# Patient Record
Sex: FEMALE | Race: WHITE | NOT HISPANIC OR LATINO | URBAN - METROPOLITAN AREA
[De-identification: names, ages, dates, MRNs, and addresses within clinical notes are randomized per-mention and may not be internally consistent; named-entity substitution may affect disease eponyms.]

---

## 2017-03-29 ENCOUNTER — OUTPATIENT (OUTPATIENT)
Dept: OUTPATIENT SERVICES | Facility: HOSPITAL | Age: 82
LOS: 1 days | End: 2017-03-29
Payer: MEDICARE

## 2017-03-29 VITALS
WEIGHT: 104.94 LBS | DIASTOLIC BLOOD PRESSURE: 78 MMHG | SYSTOLIC BLOOD PRESSURE: 104 MMHG | HEIGHT: 57.5 IN | OXYGEN SATURATION: 97 % | TEMPERATURE: 98 F | RESPIRATION RATE: 20 BRPM | HEART RATE: 72 BPM

## 2017-03-29 DIAGNOSIS — H26.9 UNSPECIFIED CATARACT: Chronic | ICD-10-CM

## 2017-03-29 DIAGNOSIS — S83.242D OTHER TEAR OF MEDIAL MENISCUS, CURRENT INJURY, LEFT KNEE, SUBSEQUENT ENCOUNTER: ICD-10-CM

## 2017-03-29 DIAGNOSIS — Z41.1 ENCOUNTER FOR COSMETIC SURGERY: Chronic | ICD-10-CM

## 2017-03-29 DIAGNOSIS — H91.90 UNSPECIFIED HEARING LOSS, UNSPECIFIED EAR: ICD-10-CM

## 2017-03-29 DIAGNOSIS — S83.209A UNSPECIFIED TEAR OF UNSPECIFIED MENISCUS, CURRENT INJURY, UNSPECIFIED KNEE, INITIAL ENCOUNTER: ICD-10-CM

## 2017-03-29 DIAGNOSIS — H54.8 LEGAL BLINDNESS, AS DEFINED IN USA: ICD-10-CM

## 2017-03-29 LAB
BUN SERPL-MCNC: 23 MG/DL — SIGNIFICANT CHANGE UP (ref 7–23)
CALCIUM SERPL-MCNC: 9.2 MG/DL — SIGNIFICANT CHANGE UP (ref 8.4–10.5)
CHLORIDE SERPL-SCNC: 102 MMOL/L — SIGNIFICANT CHANGE UP (ref 98–107)
CO2 SERPL-SCNC: 26 MMOL/L — SIGNIFICANT CHANGE UP (ref 22–31)
CREAT SERPL-MCNC: 0.87 MG/DL — SIGNIFICANT CHANGE UP (ref 0.5–1.3)
GLUCOSE SERPL-MCNC: 56 MG/DL — LOW (ref 70–99)
HCT VFR BLD CALC: 39.1 % — SIGNIFICANT CHANGE UP (ref 34.5–45)
HGB BLD-MCNC: 12.4 G/DL — SIGNIFICANT CHANGE UP (ref 11.5–15.5)
MCHC RBC-ENTMCNC: 29.3 PG — SIGNIFICANT CHANGE UP (ref 27–34)
MCHC RBC-ENTMCNC: 31.7 % — LOW (ref 32–36)
MCV RBC AUTO: 92.4 FL — SIGNIFICANT CHANGE UP (ref 80–100)
PLATELET # BLD AUTO: 267 K/UL — SIGNIFICANT CHANGE UP (ref 150–400)
PMV BLD: 11.8 FL — SIGNIFICANT CHANGE UP (ref 7–13)
POTASSIUM SERPL-MCNC: 4.4 MMOL/L — SIGNIFICANT CHANGE UP (ref 3.5–5.3)
POTASSIUM SERPL-SCNC: 4.4 MMOL/L — SIGNIFICANT CHANGE UP (ref 3.5–5.3)
RBC # BLD: 4.23 M/UL — SIGNIFICANT CHANGE UP (ref 3.8–5.2)
RBC # FLD: 14.1 % — SIGNIFICANT CHANGE UP (ref 10.3–14.5)
SODIUM SERPL-SCNC: 140 MMOL/L — SIGNIFICANT CHANGE UP (ref 135–145)
WBC # BLD: 6.34 K/UL — SIGNIFICANT CHANGE UP (ref 3.8–10.5)
WBC # FLD AUTO: 6.34 K/UL — SIGNIFICANT CHANGE UP (ref 3.8–10.5)

## 2017-03-29 PROCEDURE — 93010 ELECTROCARDIOGRAM REPORT: CPT

## 2017-03-29 NOTE — H&P PST ADULT - PROBLEM SELECTOR PLAN 1
Left Knee Arthroscopy partial Medial and lateral Menisectomy    Pre op instructions including pepcid given to pt     Pt to Dr manzanares for pre op m/c

## 2017-03-29 NOTE — H&P PST ADULT - NEGATIVE NEUROLOGICAL SYMPTOMS
no generalized seizures/no weakness/no syncope/no focal seizures/no transient paralysis/no paresthesias

## 2017-03-29 NOTE — H&P PST ADULT - RS GEN PE MLT RESP DETAILS PC
respirations non-labored/airway patent/good air movement/breath sounds equal/clear to auscultation bilaterally

## 2017-03-29 NOTE — H&P PST ADULT - ABILITY TO HEAR (WITH HEARING AID OR HEARING APPLIANCE IF NORMALLY USED):
Mildly to Moderately Impaired: difficulty hearing in some environments or speaker may need to increase volume or speak distinctly/bilateral hearing aides

## 2017-03-29 NOTE — H&P PST ADULT - NSANTHOSAYNRD_GEN_A_CORE
No. SONI screening performed.  STOP BANG Legend: 0-2 = LOW Risk; 3-4 = INTERMEDIATE Risk; 5-8 = HIGH Risk

## 2017-03-29 NOTE — H&P PST ADULT - PSH
Bilateral cataracts  s/p Right  cataract excision/ s/p Left Cataract Excision  S/P rhinoplasty  @ 16 y.o.

## 2017-03-29 NOTE — H&P PST ADULT - LYMPHATIC
anterior cervical L/anterior cervical R/posterior cervical R/posterior cervical L/supraclavicular R/supraclavicular L

## 2017-03-29 NOTE — H&P PST ADULT - HISTORY OF PRESENT ILLNESS
Pt iooprtss an 85 y.o. female ;   Pt reports " stumbling ' and injuring left knee . Pt to surgeon ; an mri was done ; pt states xrays were also done ; pt now presents for Left Knee Arthroscopy l Partial Medial and Lateral Menisectomy Pt i an 85 y.o. female ;   Pt reports " stumbling ' and injuring left knee . Pt to surgeon ; an mri was done ; pt states xrays were also done ; pt now presents for Left Knee Arthroscopy l Partial Medial and Lateral Menisectomy

## 2017-04-05 ENCOUNTER — OUTPATIENT (OUTPATIENT)
Dept: OUTPATIENT SERVICES | Facility: HOSPITAL | Age: 82
LOS: 1 days | Discharge: HOME | End: 2017-04-05

## 2017-04-05 ENCOUNTER — TRANSCRIPTION ENCOUNTER (OUTPATIENT)
Age: 82
End: 2017-04-05

## 2017-04-05 VITALS
RESPIRATION RATE: 14 BRPM | SYSTOLIC BLOOD PRESSURE: 130 MMHG | HEIGHT: 57.5 IN | WEIGHT: 104.94 LBS | OXYGEN SATURATION: 97 % | DIASTOLIC BLOOD PRESSURE: 61 MMHG | HEART RATE: 73 BPM | TEMPERATURE: 98 F

## 2017-04-05 VITALS
HEART RATE: 97 BPM | OXYGEN SATURATION: 99 % | SYSTOLIC BLOOD PRESSURE: 124 MMHG | TEMPERATURE: 99 F | DIASTOLIC BLOOD PRESSURE: 56 MMHG | RESPIRATION RATE: 22 BRPM

## 2017-04-05 DIAGNOSIS — Z41.1 ENCOUNTER FOR COSMETIC SURGERY: Chronic | ICD-10-CM

## 2017-04-05 DIAGNOSIS — S83.242D OTHER TEAR OF MEDIAL MENISCUS, CURRENT INJURY, LEFT KNEE, SUBSEQUENT ENCOUNTER: ICD-10-CM

## 2017-04-05 DIAGNOSIS — H26.9 UNSPECIFIED CATARACT: Chronic | ICD-10-CM

## 2017-04-05 RX ORDER — OXYCODONE AND ACETAMINOPHEN 5; 325 MG/1; MG/1
2 TABLET ORAL
Qty: 30 | Refills: 0
Start: 2017-04-05 | End: 2017-04-09

## 2017-04-05 NOTE — ASU DISCHARGE PLAN (ADULT/PEDIATRIC). - SPECIAL INSTRUCTIONS
Please refer to MD pre-printed instruction sheet.    IF any written instructions differ from what your surgeon spoke with you about, please follow what your surgeon instructed over the pre-printed instructions.  Please call your surgeon's office if you have any questions.

## 2017-04-05 NOTE — ASU DISCHARGE PLAN (ADULT/PEDIATRIC). - NOTIFY
Fever greater than 101/Persistent Nausea and Vomiting/Swelling that continues/Bleeding that does not stop/Pain not relieved by Medications/Unable to Urinate

## 2017-04-05 NOTE — ASU DISCHARGE PLAN (ADULT/PEDIATRIC). - NURSING INSTRUCTIONS
DO NOT take any Tylenol (Acetaminophen) or narcotics containing Tylenol until after  __6:30PM____ . You received Tylenol during your operation and it can cause damage to your liver if too much is taken within a 24 hour time period.

## 2017-11-20 ENCOUNTER — APPOINTMENT (OUTPATIENT)
Dept: OPHTHALMOLOGY | Facility: CLINIC | Age: 82
End: 2017-11-20
Payer: MEDICARE

## 2017-11-20 DIAGNOSIS — H53.009 UNSPECIFIED AMBLYOPIA, UNSPECIFIED EYE: ICD-10-CM

## 2017-11-20 DIAGNOSIS — H55.09 OTHER FORMS OF NYSTAGMUS: ICD-10-CM

## 2017-11-20 PROCEDURE — 92012 INTRM OPH EXAM EST PATIENT: CPT

## 2018-04-11 ENCOUNTER — APPOINTMENT (OUTPATIENT)
Dept: OPHTHALMOLOGY | Facility: CLINIC | Age: 83
End: 2018-04-11
Payer: MEDICARE

## 2018-04-11 DIAGNOSIS — Z96.1 PRESENCE OF INTRAOCULAR LENS: ICD-10-CM

## 2018-04-11 PROCEDURE — 92014 COMPRE OPH EXAM EST PT 1/>: CPT

## 2018-06-20 ENCOUNTER — APPOINTMENT (OUTPATIENT)
Dept: OPHTHALMOLOGY | Facility: CLINIC | Age: 83
End: 2018-06-20
Payer: MEDICARE

## 2018-06-20 PROCEDURE — 92012 INTRM OPH EXAM EST PATIENT: CPT

## 2018-06-20 RX ORDER — LOTEPREDNOL ETABONATE 5 MG/G
0.5 OINTMENT OPHTHALMIC
Qty: 1 | Refills: 0 | Status: ACTIVE | COMMUNITY
Start: 2018-06-20 | End: 1900-01-01

## 2018-08-10 PROBLEM — E78.00 PURE HYPERCHOLESTEROLEMIA, UNSPECIFIED: Chronic | Status: ACTIVE | Noted: 2017-03-29

## 2018-08-10 PROBLEM — H55.00 UNSPECIFIED NYSTAGMUS: Chronic | Status: ACTIVE | Noted: 2017-03-29

## 2018-08-10 PROBLEM — H54.8 LEGAL BLINDNESS, AS DEFINED IN USA: Chronic | Status: ACTIVE | Noted: 2017-03-29

## 2018-08-10 PROBLEM — H91.90 UNSPECIFIED HEARING LOSS, UNSPECIFIED EAR: Chronic | Status: ACTIVE | Noted: 2017-03-29

## 2018-08-20 ENCOUNTER — APPOINTMENT (OUTPATIENT)
Dept: OPHTHALMOLOGY | Facility: CLINIC | Age: 83
End: 2018-08-20
Payer: MEDICARE

## 2018-08-20 DIAGNOSIS — H04.123 DRY EYE SYNDROME OF BILATERAL LACRIMAL GLANDS: ICD-10-CM

## 2018-08-20 DIAGNOSIS — H18.51 ENDOTHELIAL CORNEAL DYSTROPHY: ICD-10-CM

## 2018-08-20 PROCEDURE — 92012 INTRM OPH EXAM EST PATIENT: CPT

## 2018-09-21 ENCOUNTER — APPOINTMENT (OUTPATIENT)
Dept: OPHTHALMOLOGY | Facility: CLINIC | Age: 83
End: 2018-09-21

## 2018-10-16 ENCOUNTER — APPOINTMENT (OUTPATIENT)
Dept: OPHTHALMOLOGY | Facility: CLINIC | Age: 83
End: 2018-10-16

## 2019-02-06 ENCOUNTER — APPOINTMENT (OUTPATIENT)
Dept: OTOLARYNGOLOGY | Facility: CLINIC | Age: 84
End: 2019-02-06
Payer: MEDICARE

## 2019-02-06 VITALS
SYSTOLIC BLOOD PRESSURE: 125 MMHG | DIASTOLIC BLOOD PRESSURE: 77 MMHG | WEIGHT: 100 LBS | BODY MASS INDEX: 20.16 KG/M2 | HEART RATE: 94 BPM | HEIGHT: 59 IN

## 2019-02-06 DIAGNOSIS — H61.23 IMPACTED CERUMEN, BILATERAL: ICD-10-CM

## 2019-02-06 DIAGNOSIS — H90.3 SENSORINEURAL HEARING LOSS, BILATERAL: ICD-10-CM

## 2019-02-06 DIAGNOSIS — Z80.9 FAMILY HISTORY OF MALIGNANT NEOPLASM, UNSPECIFIED: ICD-10-CM

## 2019-02-06 PROCEDURE — 69210 REMOVE IMPACTED EAR WAX UNI: CPT

## 2019-02-06 PROCEDURE — 92557 COMPREHENSIVE HEARING TEST: CPT

## 2019-02-06 PROCEDURE — 92567 TYMPANOMETRY: CPT

## 2019-02-06 PROCEDURE — 99204 OFFICE O/P NEW MOD 45 MIN: CPT | Mod: 25

## 2019-02-07 NOTE — ASSESSMENT
[FreeTextEntry1] : After informed verbal consent is obtained, cerumen is removed from the right and left  ear canal with a curette and suction.\par Rec: \par Debrox was prescribed and  is to be placed in both ears on a routine basis to keep them free of wax.\par Routine debridement suggested to keep the ears free of wax.\par \par bilateral sensorineural hearing loss-cleared for hearing aids\par \par tinnitus-onbserve\par

## 2019-02-07 NOTE — PHYSICAL EXAM
[Midline] : trachea located in midline position [Normal] : no rashes [de-identified] : b/l wax [] : septum deviated bilaterally

## 2019-02-07 NOTE — REASON FOR VISIT
[Initial Evaluation] : an initial evaluation for [Tinnitus] : tinnitus [FreeTextEntry2] : L ear tinnitus

## 2019-02-07 NOTE — HISTORY OF PRESENT ILLNESS
[No] : patient does not have a  history of radiation therapy [Tinnitus] : tinnitus [Hearing Loss] : hearing loss [None] : No risk factors have been identified. [de-identified] : 88 y/o w/ hx of minor hearing loss as a child who came in complaining of hearing noises in her L ear that’ has been going on for 3 weeks. She states this started after listening to her audio books. She described the noises as a crackling sound that progressively gets worse. She states when the noise becomes louder it sounds like a train. Laying down makes the tinnitus worse compared to when she is sitting. She is currently wearing hearing aides. No vertigo, ear pain, nasal congestion, dysphagia, fevers, sinus infection.  [Vertigo] : no vertigo [Anxiety] : no anxiety [Dizziness] : no dizziness [Headache] : no headache [Recurrent Otitis Media] : no recurrent otitis media [Otitis Media with Effusion] : no otitis media with effusion [Congenital Ear Malformation] : no congenital ear malformation [Perilymphatic Fistula] : no perilymphatic fistula [Hypertension] : no hypertension [Loud Noise Exposure] : no history of loud noise exposure [Smoking] : no smoking [Early Onset Hearing Loss] : no early onset hearing loss [Stroke] : no stroke [Facial Pain] : no facial pain [Facial Pressure] : no facial pressure [Nasal Congestion] : no nasal congestion [Ear Pain] : no ear pain [Ear Pressure] : no ear pressure [Fever] : no fever [Diplopia] : no diplopia [Ear Fullness] : no ear fullness [Allergic Rhinitis] : no allergic rhinitis [Environmental Allergies] : no environmental allergies [Seasonal Allergies] : no seasonal allergies [Environmental Allergens] : no environmental allergens [Adenoidectomy] : no adenoidectomy [Nasal FB Removal] : no nasal foreign body removal [Allergies] : no allergies [Asthma] : no asthma [Neck Mass] : no neck mass [Neck Pain] : no neck pain [Chills] : no chills [Cold Intolerance] : no cold intolerance [Cough] : no cough [Fatigue] : no fatigue [Heat Intolerance] : no heat intolerance [Hyperthyroidism] : no hyperthyroidism [Sialadenitis] : no sialadenitis [Hodgkin Disease] : no hodgkin disease [Non-Hodgkin Lymphoma] : no non-hodgkin lymphoma [Tobacco Use] : no tobacco use [Alcohol Use] : no alcohol use [Graves Disease] : no graves disease [Thyroid Cancer] : no thyroid cancer

## 2019-02-08 ENCOUNTER — APPOINTMENT (OUTPATIENT)
Dept: OTOLARYNGOLOGY | Facility: CLINIC | Age: 84
End: 2019-02-08
Payer: MEDICARE

## 2019-02-08 VITALS
WEIGHT: 100 LBS | HEIGHT: 59 IN | SYSTOLIC BLOOD PRESSURE: 147 MMHG | HEART RATE: 84 BPM | DIASTOLIC BLOOD PRESSURE: 76 MMHG | BODY MASS INDEX: 20.16 KG/M2

## 2019-02-08 DIAGNOSIS — H93.13 TINNITUS, BILATERAL: ICD-10-CM

## 2019-02-08 PROCEDURE — 99214 OFFICE O/P EST MOD 30 MIN: CPT

## 2019-02-08 NOTE — REASON FOR VISIT
[Initial Consultation] : an initial consultation for [Hearing Loss] : hearing loss [Tinnitus] : tinnitus [Vertigo] : vertigo

## 2019-02-09 PROBLEM — H93.13 TINNITUS, BILATERAL: Status: ACTIVE | Noted: 2019-02-06

## 2019-02-09 NOTE — CONSULT LETTER
[Dear  ___] : Dear  [unfilled], [Consult Letter:] : I had the pleasure of evaluating your patient, [unfilled]. [Please see my note below.] : Please see my note below. [Consult Closing:] : Thank you very much for allowing me to participate in the care of this patient.  If you have any questions, please do not hesitate to contact me. [Sincerely,] : Sincerely, [FreeTextEntry3] : Everrado Wade MD, FACS \par  of Otolaryngology  \par [facility] \par [address] \[place] [address] \par Phone: [phone] \par Fax: [phone] \par \par

## 2019-02-09 NOTE — REVIEW OF SYSTEMS
[Negative] : Heme/Lymph [Ear Pain] : ear pain [Hearing Loss] : hearing loss [Dizziness] : dizziness [Ear Noises] : ear noises [Eye Pain] : eye pain [Eyes Itch] : itching of the eyes [FreeTextEntry3] : watery eyes

## 2019-02-09 NOTE — HISTORY OF PRESENT ILLNESS
[de-identified] : 86 yo seen 2 days ago by Dr. Santamaria for tinnitus after noise exposure. " complaining of hearing noises in her L ear that’ has been going on for 3 weeks. She states this started after listening to her audio books. She described the noises as a crackling sound that progressively gets worse. She states when the noise becomes louder it sounds like a train. Laying down makes the tinnitus worse compared to when she is sitting. She is currently wearing hearing aides. No vertigo, ear pain, nasal congestion, dysphagia, fevers, sinus infection. " Dr Santamaria removed cerumen  felt better but noise still present - came for second opinion. Cant sleep at night. Takes ASA daily.\par

## 2019-02-18 ENCOUNTER — APPOINTMENT (OUTPATIENT)
Dept: OTOLARYNGOLOGY | Facility: CLINIC | Age: 84
End: 2019-02-18

## 2019-05-13 ENCOUNTER — APPOINTMENT (OUTPATIENT)
Dept: PHARMACY | Facility: CLINIC | Age: 84
End: 2019-05-13
Payer: SELF-PAY

## 2019-05-13 PROCEDURE — V5010C: CUSTOM

## 2019-06-05 ENCOUNTER — APPOINTMENT (OUTPATIENT)
Dept: PHARMACY | Facility: CLINIC | Age: 84
End: 2019-06-05

## 2019-06-06 ENCOUNTER — OTHER (OUTPATIENT)
Age: 84
End: 2019-06-06

## 2019-08-07 ENCOUNTER — APPOINTMENT (OUTPATIENT)
Dept: OTOLARYNGOLOGY | Facility: CLINIC | Age: 84
End: 2019-08-07

## 2019-08-07 ENCOUNTER — APPOINTMENT (OUTPATIENT)
Dept: OPHTHALMOLOGY | Facility: CLINIC | Age: 84
End: 2019-08-07

## 2019-08-27 ENCOUNTER — APPOINTMENT (OUTPATIENT)
Dept: OPHTHALMOLOGY | Facility: CLINIC | Age: 84
End: 2019-08-27

## 2019-10-29 ENCOUNTER — NON-APPOINTMENT (OUTPATIENT)
Age: 84
End: 2019-10-29

## 2019-10-29 ENCOUNTER — APPOINTMENT (OUTPATIENT)
Dept: OPHTHALMOLOGY | Facility: CLINIC | Age: 84
End: 2019-10-29
Payer: MEDICARE

## 2019-10-29 PROCEDURE — 92014 COMPRE OPH EXAM EST PT 1/>: CPT

## 2020-11-02 NOTE — ASU DISCHARGE PLAN (ADULT/PEDIATRIC). - ACTIVITY LEVEL
Patient informed no sports/gym/weight bearing as tolerated/Please refer to MD pre-printed instruction sheet.

## 2023-02-22 NOTE — ASU PREOPERATIVE ASSESSMENT, ADULT ([facility] ONLY) - TEACHING/LEARNING FACTORS IMPACT ABILITY TO LEARN
KNEE VISIT      HISTORY OF PRESENT ILLNESS    Emily Flaherty is a 61 y.o. female who presents for evaluation of bilateral knee pain. I saw her several years ago for her right knee and now she has pain in both knees this been going on for the last few years. She has had intermittently but over the last 3 months is escalated and become much worse to where she grades her pain 10/10 frequently. She is not able to sleep and knows that she has swelling and points to the inner aspect of both knees where her pain is. She cannot recall any specific history of trauma but has difficulty with steps twisting etc.  She has pain posteriorly in the knees which is tender. ROS    We will document in the patient history form dated 2/22/2023  All other ROS negative except for above.     Past Surgical history    Past Surgical History:   Procedure Laterality Date    BACK SURGERY  2012    lumbar     CHOLECYSTECTOMY  2013    COLONOSCOPY N/A 7/28/2020    COLON performed by Gretel Walden MD at [address] N/A 7/28/2020    EGD (10:30) performed by Gretel Walden MD at [address] [facility]  2009    carpal tunnel release    HYSTERECTOMY (CERVIX STATUS UNKNOWN)      HYSTERECTOMY, VAGINAL  2000    uterus removal only    NECK SURGERY  2013    cervical    SHOULDER SURGERY  June 2014    repair of rotator cuff       PAST MEDICAL    Past Medical History:   Diagnosis Date    Asthma     COPD (chronic obstructive pulmonary disease) ([address]     Diabetes mellitus ([address]     Gastroesophageal reflux disease without esophagitis 1/6/2021    Hypertension        Allergies    Allergies   Allergen Reactions    Dicyclomine     Lactose     Linzess [Linaclotide] Other (See Comments)     Bloating, unable to have BM    Lisinopril     Mobic [Meloxicam] Other (See Comments)     abcominal cramping    Nexium [Esomeprazole]        Meds    Current Outpatient Medications   Medication Sig Dispense Refill    methylPREDNISolone (MEDROL, JUAN ALBERTO,) 4 MG tablet Take by mouth. 1 kit 0    varenicline (CHANTIX) 1 MG tablet Take 1 tablet by mouth 2 times daily 180 tablet 2    polyethylene glycol (GOODSENSE CLEARLAX) 17 GM/SCOOP powder MIX 17 GRAMS OF POWDER (SEE INSIDE OF CAP) IN 8 OUNCES OF LIQUID.  STIR UNTIL DISSOLVED THEN DRINK SOLUTION ONCE DAILY AS NEEDED FOR CONSTIPATION 510 g 5    tiZANidine (ZANAFLEX) 4 MG tablet TAKE 1 TABLET 3 TIMES A DAYAS NEEDED FOR MUSCLE SPASMS 270 tablet 1    gabapentin (NEURONTIN) 600 MG tablet TAKE 1 TABLET 3 TIMES A  tablet 0    albuterol sulfate HFA (PROVENTIL;VENTOLIN;PROAIR) 108 (90 Base) MCG/ACT inhaler USE 2 INHALATIONS ORALLY 4 TIMES DAILY AS NEEDED FOR  WHEEZING 54 g 1    omeprazole (PRILOSEC) 40 MG delayed release capsule TAKE 1 CAPSULE TWICE DAILY BEFORE MEALS 180 capsule 1    metoclopramide (REGLAN) 10 MG tablet TAKE 1 TABLET 4 TIMES DAILY 360 tablet 1    sucralfate (CARAFATE) 1 GM tablet TAKE 1 TABLET 4 TIMES DAILY 360 tablet 1    montelukast (SINGULAIR) 10 MG tablet TAKE 1 TABLET EVERY NIGHT 90 tablet 1    metoprolol (LOPRESSOR) 100 MG tablet TAKE 1 TABLET TWICE A  tablet 1    losartan-hydroCHLOROthiazide (HYZAAR) 100-25 MG per tablet TAKE 1 TABLET DAILY 90 tablet 1    amLODIPine (NORVASC) 2.5 MG tablet TAKE 1 TABLET DAILY 90 tablet 1    topiramate (TOPAMAX) 100 MG tablet TAKE 2 TABLETS DAILY 180 tablet 1    mirtazapine (REMERON) 7.5 MG tablet TAKE 1 TABLET EVERY NIGHT 90 tablet 1    pramipexole (MIRAPEX) 0.25 MG tablet TAKE 1 TABLET DAILY 90 tablet 1    baclofen (LIORESAL) 10 MG tablet       cloNIDine (CATAPRES) 0.1 MG tablet       traMADol (ULTRAM) 50 MG tablet       atorvastatin (LIPITOR) 80 MG tablet TAKE 1 TABLET EVERY DAY 90 tablet 3    escitalopram (LEXAPRO) 20 MG tablet TAKE 1 TABLET EVERY DAY 90 tablet 1    Alcohol Swabs (B-D SINGLE USE SWABS REGULAR) PADS USE EVERY DAY AS DIRECTED 100 each 3    fluticasone-salmeterol (ADVAIR) 250-50 MCG/DOSE AEPB Inhale 1 puff into the lungs every 12 hours 3 each 3    loratadine (CLARITIN) 10 MG tablet Take 1 tablet by mouth daily 30 tablet 3    melatonin 5 MG TABS tablet Take 1 tablet by mouth nightly as needed (insomnia) 30 tablet 3    albuterol (PROVENTIL) (2.5 MG/3ML) 0.083% nebulizer solution Take 2.5 mg by nebulization every 6 hours as needed. No current facility-administered medications for this visit. Social    Social History     Socioeconomic History    Marital status: Single     Spouse name: Not on file    Number of children: Not on file    Years of education: Not on file    Highest education level: Not on file   Occupational History    Not on file   Tobacco Use    Smoking status: Former     Packs/day: 0.65     Years: 43.00     Pack years: 27.95     Types: Cigarettes     Quit date: 2022     Years since quittin.6    Smokeless tobacco: Never    Tobacco comments:     Per smoking hx audit, in  smoked 35 years, at heaviest smoked 1 ppd, average 0.65 ppd   Vaping Use    Vaping Use: Never used   Substance and Sexual Activity    Alcohol use: No     Alcohol/week: 0.0 standard drinks    Drug use: No    Sexual activity: Yes     Partners: Male   Other Topics Concern    Not on file   Social History Narrative    Not on file     Social Determinants of Health     Financial Resource Strain: Not on file   Food Insecurity: Not on file   Transportation Needs: Not on file   Physical Activity: Not on file   Stress: Not on file   Social Connections: Not on file   Intimate Partner Violence: Not on file   Housing Stability: Not on file       Family HISTORY    Family History   Problem Relation Age of Onset    Rheum Arthritis Mother     Asthma Mother     Diabetes Mother     Migraines Mother     Cancer Paternal Grandmother         unknown    Heart Failure Father         CAD    High Cholesterol Father     Hypertension Father     Stroke Father        PHYSICAL EXAM    Vital Signs: There were no vitals taken for this visit.   General Appearance:  Normal body habitus. Alert and oriented to person, place, and time. Affect:  Normal.   Gait: Antalgic. Good balance and coordination. Skin:  Intact. Sensation:  Intact. Strength:  Intact. Reflexes:  Intact. Pulses:  Intact. Knee Exam:    Effusion: 1+ bilateral    Range of Motion Right Left   Extension 0 0   Flexion 110 110     Provocative Test Right Left    Positive Negative Positive Negative   Anterior drawer [] [] [] []   Lachman [] [] [] []   Posterior drawer [] [] [] []   Varus testing [] [x] [] [x]   Valgus testing [x] [] [x] []   Joint line tenderness [x] [] [x] []     Additional Exam Comments: Her neurocirculatory lymphatic exam otherwise is normal symmetric to both lower extremities. She does have discomfort and along the medial joint line to direct palpation valgus stress and Ganesh's maneuver. She has no gross instability. 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 Conservative care options including physical therapy, NSAIDs, bracing, and activity modification were discussed. 2.  The indications for therapeutic injections were discussed. 3.  The indications for additional imaging studies were discussed. 4.  After considering the various options discussed, the patient elected to pursue a course that includes giving her a Medrol Dosepak for inflammation as opposed to an injection but would recommend MRIs of both knees to be done when it can be arranged because of the degree of severity of disability and pain that she has at this time despite her attempts to improve it over the last few years. none

## 2023-07-02 ENCOUNTER — EMERGENCY (EMERGENCY)
Facility: HOSPITAL | Age: 88
LOS: 1 days | Discharge: HOME | End: 2023-07-02
Attending: EMERGENCY MEDICINE
Payer: MEDICARE

## 2023-07-02 VITALS
HEART RATE: 92 BPM | DIASTOLIC BLOOD PRESSURE: 78 MMHG | OXYGEN SATURATION: 97 % | RESPIRATION RATE: 18 BRPM | SYSTOLIC BLOOD PRESSURE: 143 MMHG

## 2023-07-02 VITALS
DIASTOLIC BLOOD PRESSURE: 68 MMHG | TEMPERATURE: 98 F | RESPIRATION RATE: 16 BRPM | HEIGHT: 58 IN | HEART RATE: 63 BPM | OXYGEN SATURATION: 96 % | WEIGHT: 89.95 LBS | SYSTOLIC BLOOD PRESSURE: 125 MMHG

## 2023-07-02 DIAGNOSIS — Z41.1 ENCOUNTER FOR COSMETIC SURGERY: Chronic | ICD-10-CM

## 2023-07-02 DIAGNOSIS — H26.9 UNSPECIFIED CATARACT: Chronic | ICD-10-CM

## 2023-07-02 LAB
ALBUMIN SERPL ELPH-MCNC: 3.5 G/DL — SIGNIFICANT CHANGE UP (ref 3.3–5)
ALP SERPL-CCNC: 77 U/L — SIGNIFICANT CHANGE UP (ref 40–120)
ALT FLD-CCNC: 9 U/L — LOW (ref 10–45)
ANION GAP SERPL CALC-SCNC: 10 MMOL/L — SIGNIFICANT CHANGE UP (ref 5–17)
APPEARANCE UR: CLEAR — SIGNIFICANT CHANGE UP
AST SERPL-CCNC: 12 U/L — SIGNIFICANT CHANGE UP (ref 10–40)
BACTERIA # UR AUTO: ABNORMAL
BASOPHILS # BLD AUTO: 0.06 K/UL — SIGNIFICANT CHANGE UP (ref 0–0.2)
BASOPHILS NFR BLD AUTO: 0.7 % — SIGNIFICANT CHANGE UP (ref 0–2)
BILIRUB SERPL-MCNC: 0.3 MG/DL — SIGNIFICANT CHANGE UP (ref 0.2–1.2)
BILIRUB UR-MCNC: NEGATIVE — SIGNIFICANT CHANGE UP
BUN SERPL-MCNC: 29 MG/DL — HIGH (ref 7–23)
CALCIUM SERPL-MCNC: 9.1 MG/DL — SIGNIFICANT CHANGE UP (ref 8.4–10.5)
CHLORIDE SERPL-SCNC: 105 MMOL/L — SIGNIFICANT CHANGE UP (ref 96–108)
CO2 SERPL-SCNC: 26 MMOL/L — SIGNIFICANT CHANGE UP (ref 22–31)
COLOR SPEC: SIGNIFICANT CHANGE UP
CREAT SERPL-MCNC: 1.41 MG/DL — HIGH (ref 0.5–1.3)
DIFF PNL FLD: NEGATIVE — SIGNIFICANT CHANGE UP
EGFR: 35 ML/MIN/1.73M2 — LOW
EOSINOPHIL # BLD AUTO: 0.45 K/UL — SIGNIFICANT CHANGE UP (ref 0–0.5)
EOSINOPHIL NFR BLD AUTO: 5.5 % — SIGNIFICANT CHANGE UP (ref 0–6)
EPI CELLS # UR: 0 /HPF — SIGNIFICANT CHANGE UP
GLUCOSE SERPL-MCNC: 88 MG/DL — SIGNIFICANT CHANGE UP (ref 70–99)
GLUCOSE UR QL: NEGATIVE — SIGNIFICANT CHANGE UP
HCT VFR BLD CALC: 32.9 % — LOW (ref 34.5–45)
HGB BLD-MCNC: 10.1 G/DL — LOW (ref 11.5–15.5)
HYALINE CASTS # UR AUTO: 1 /LPF — SIGNIFICANT CHANGE UP (ref 0–2)
IMM GRANULOCYTES NFR BLD AUTO: 0.4 % — SIGNIFICANT CHANGE UP (ref 0–0.9)
KETONES UR-MCNC: NEGATIVE — SIGNIFICANT CHANGE UP
LEUKOCYTE ESTERASE UR-ACNC: ABNORMAL
LYMPHOCYTES # BLD AUTO: 1.68 K/UL — SIGNIFICANT CHANGE UP (ref 1–3.3)
LYMPHOCYTES # BLD AUTO: 20.6 % — SIGNIFICANT CHANGE UP (ref 13–44)
MCHC RBC-ENTMCNC: 28.8 PG — SIGNIFICANT CHANGE UP (ref 27–34)
MCHC RBC-ENTMCNC: 30.7 GM/DL — LOW (ref 32–36)
MCV RBC AUTO: 93.7 FL — SIGNIFICANT CHANGE UP (ref 80–100)
MONOCYTES # BLD AUTO: 1.02 K/UL — HIGH (ref 0–0.9)
MONOCYTES NFR BLD AUTO: 12.5 % — SIGNIFICANT CHANGE UP (ref 2–14)
NEUTROPHILS # BLD AUTO: 4.93 K/UL — SIGNIFICANT CHANGE UP (ref 1.8–7.4)
NEUTROPHILS NFR BLD AUTO: 60.3 % — SIGNIFICANT CHANGE UP (ref 43–77)
NITRITE UR-MCNC: POSITIVE
NRBC # BLD: 0 /100 WBCS — SIGNIFICANT CHANGE UP (ref 0–0)
PH UR: 7.5 — SIGNIFICANT CHANGE UP (ref 5–8)
PLATELET # BLD AUTO: 243 K/UL — SIGNIFICANT CHANGE UP (ref 150–400)
POTASSIUM SERPL-MCNC: 4.2 MMOL/L — SIGNIFICANT CHANGE UP (ref 3.5–5.3)
POTASSIUM SERPL-SCNC: 4.2 MMOL/L — SIGNIFICANT CHANGE UP (ref 3.5–5.3)
PROT SERPL-MCNC: 5.8 G/DL — LOW (ref 6–8.3)
PROT UR-MCNC: ABNORMAL
RBC # BLD: 3.51 M/UL — LOW (ref 3.8–5.2)
RBC # FLD: 14.4 % — SIGNIFICANT CHANGE UP (ref 10.3–14.5)
RBC CASTS # UR COMP ASSIST: 2 /HPF — SIGNIFICANT CHANGE UP (ref 0–4)
SODIUM SERPL-SCNC: 141 MMOL/L — SIGNIFICANT CHANGE UP (ref 135–145)
SP GR SPEC: 1.01 — SIGNIFICANT CHANGE UP (ref 1.01–1.02)
UROBILINOGEN FLD QL: NEGATIVE — SIGNIFICANT CHANGE UP
WBC # BLD: 8.17 K/UL — SIGNIFICANT CHANGE UP (ref 3.8–10.5)
WBC # FLD AUTO: 8.17 K/UL — SIGNIFICANT CHANGE UP (ref 3.8–10.5)
WBC UR QL: 34 /HPF — HIGH (ref 0–5)

## 2023-07-02 PROCEDURE — 70450 CT HEAD/BRAIN W/O DYE: CPT | Mod: MA

## 2023-07-02 PROCEDURE — 93005 ELECTROCARDIOGRAM TRACING: CPT

## 2023-07-02 PROCEDURE — 72190 X-RAY EXAM OF PELVIS: CPT | Mod: 26

## 2023-07-02 PROCEDURE — 99285 EMERGENCY DEPT VISIT HI MDM: CPT | Mod: 25

## 2023-07-02 PROCEDURE — 85025 COMPLETE CBC W/AUTO DIFF WBC: CPT

## 2023-07-02 PROCEDURE — 81001 URINALYSIS AUTO W/SCOPE: CPT

## 2023-07-02 PROCEDURE — 87086 URINE CULTURE/COLONY COUNT: CPT

## 2023-07-02 PROCEDURE — 73060 X-RAY EXAM OF HUMERUS: CPT | Mod: 26,RT

## 2023-07-02 PROCEDURE — 70450 CT HEAD/BRAIN W/O DYE: CPT | Mod: 26,MA

## 2023-07-02 PROCEDURE — 71045 X-RAY EXAM CHEST 1 VIEW: CPT

## 2023-07-02 PROCEDURE — 80053 COMPREHEN METABOLIC PANEL: CPT

## 2023-07-02 PROCEDURE — 99284 EMERGENCY DEPT VISIT MOD MDM: CPT | Mod: GC

## 2023-07-02 PROCEDURE — 73060 X-RAY EXAM OF HUMERUS: CPT

## 2023-07-02 PROCEDURE — 71045 X-RAY EXAM CHEST 1 VIEW: CPT | Mod: 26

## 2023-07-02 PROCEDURE — 96374 THER/PROPH/DIAG INJ IV PUSH: CPT

## 2023-07-02 PROCEDURE — 72190 X-RAY EXAM OF PELVIS: CPT

## 2023-07-02 RX ORDER — CEFTRIAXONE 500 MG/1
1000 INJECTION, POWDER, FOR SOLUTION INTRAMUSCULAR; INTRAVENOUS ONCE
Refills: 0 | Status: COMPLETED | OUTPATIENT
Start: 2023-07-02 | End: 2023-07-02

## 2023-07-02 RX ORDER — CEFPODOXIME PROXETIL 100 MG
1 TABLET ORAL
Qty: 14 | Refills: 0
Start: 2023-07-02 | End: 2023-07-08

## 2023-07-02 RX ADMIN — CEFTRIAXONE 100 MILLIGRAM(S): 500 INJECTION, POWDER, FOR SOLUTION INTRAMUSCULAR; INTRAVENOUS at 08:59

## 2023-07-02 NOTE — ED ADULT NURSE REASSESSMENT NOTE - NSFALLHARMRISKINTERV_ED_ALL_ED
Assistance OOB with selected safe patient handling equipment if applicable/Assistance with ambulation/Communicate risk of Fall with Harm to all staff, patient, and family/Monitor gait and stability/Monitor for mental status changes and reorient to person, place, and time, as needed/Provide visual cue: red socks, yellow wristband, yellow gown, etc/Reinforce activity limits and safety measures with patient and family/Toileting schedule using arm’s reach rule for commode and bathroom/Use of alarms - bed, stretcher, chair and/or video monitoring/Bed in lowest position, wheels locked, appropriate side rails in place/Call bell, personal items and telephone in reach/Instruct patient to call for assistance before getting out of bed/chair/stretcher/Non-slip footwear applied when patient is off stretcher/[place] to call system/Physically safe environment - no spills, clutter or unnecessary equipment/Purposeful Proactive Rounding/Room/bathroom lighting operational, light cord in reach

## 2023-07-02 NOTE — ED PROVIDER NOTE - PROGRESS NOTE DETAILS
Nivia Bell MD (PGY-1 EM): Discussed labs, XR, UA, CT with family. patient has UTI, gave abx, and ordered to pharm. will dc.

## 2023-07-02 NOTE — ED PROVIDER NOTE - PHYSICAL EXAMINATION
GENERAL: no acute distress, ectomorphic body habitus  HEENT: R forehead contusion w/ TTP, hard of hearing, atraumatic, normocephalic, no conjunctivitis or discharge, no nasal discharge or epistaxis, clear pharynx  CV: regular rate, normal rhythm, normal S1/S2, no murmurs/rubs, no cyanosis  PULM: normal work of breathing, clear breath sounds in b/l upper/lower lung fields, no crackles/rales/rhonchi/wheezing  GI: soft/non-tender/nondistended abdomen, no guarding or rebound tenderness, no palpable masses  : no CVA tenderness  NEURO: A&Ox1, follows commands, normal speech, no focal motor or sensory deficits  MSK: R proximal humerus TTP w/ palpable swelling, ranging all extremities with no appreciable loss of ROM  EXT: no peripheral edema, no calf tenderness, no redness or swelling  SKIN: warm, dry, and intact, no rashes  PSYCH: appropriate mood and affect

## 2023-07-02 NOTE — ED ADULT NURSE NOTE - OBJECTIVE STATEMENT
89 year old female, A&Ox2, PMH of dementia, GERD, BIB EMS from [place] at [facility] after unwitnessed fall. Patient poor historian, unable to recall events. Patient found to have trauma to the right temple. On arrival, patient cooperative with staff but hard of hearing. Patient asking repetitive questions. Unable to recall the fall. Denies CP, SOB, HA, n/v/d, abdominal pain, difficulty urinating at this time.  Patient placed on cardiac monitor, NSR 70s. Heart rate and rhythm regular. Respirations clear and equal bilaterally. Abdomen soft, nontender and nondistended. Patient endorsing tenderness to the R humerus. ROM of UE and LE intact. Peripheral pulses strong and equal bilaterally. Safety and comfort measures maintained.

## 2023-07-02 NOTE — ED ADULT NURSE REASSESSMENT NOTE - NS ED NURSE REASSESS COMMENT FT1
Pt pulling at IV and trying to climb out of bed, 1:1 Constant Observation initiated per MD order for pt safety. IV DCed at this time per MD Mond.
Straight urinary catheter inserted using sterile technique. 2 RNs present to confirm sterility. Pt tolerated procedure well. 100 cc of clear yellow urine drained. Sterile specimen collected. UA and Culture sent.
Report received from ROSALBA Salas. Pt A&Ox1-2 to self, is aware she is not home, does not know where she is. Pt has hx of dementia. Pt breathing spontaneously and unlabored, speaking full sentences, moving extremities appropriately. Appropriate safety measures in place, stretcher locked in lowest position, call bell within reach.

## 2023-07-02 NOTE — ED PROVIDER NOTE - NSICDXPASTMEDICALHX_GEN_ALL_CORE_FT
PAST MEDICAL HISTORY:  Hopi (hard of hearing)     Hypercholesterolemia     Legally blind     Nystagmus ou

## 2023-07-02 NOTE — ED PROVIDER NOTE - NSICDXPASTSURGICALHX_GEN_ALL_CORE_FT
PAST SURGICAL HISTORY:  Bilateral cataracts s/p Right  cataract excision/ s/p Left Cataract Excision    S/P rhinoplasty @ 16 y.o.

## 2023-07-02 NOTE — ED PROVIDER NOTE - PATIENT PORTAL LINK FT
You can access the FollowMyHealth Patient Portal offered by [facility] by registering at the following website: [email or web address]. By joining [facility]’s FollowMyHealth portal, you will also be able to view your health information using other applications (apps) compatible with our system.

## 2023-07-02 NOTE — ED PROVIDER NOTE - ATTENDING CONTRIBUTION TO CARE
Hx: fall, unwitnessed at facility.  Noted bump R forehead and R shoulder region.  HX limited due to dementia.    PE: well appearing, nontoxic, no c/t/l spine td, no chest wall or abdominal td, no pelvis/hip/knee td, no bony td throughout, FROM shoulders, small contusion of R forehead and R proximal arm without bony td or lac.    MDM: closed head injury, contusion R shoulder, r/o ICH, r/o fracture, check cbc r/o anemia or leukocytosis, check bmp to r/o metabolic derangement and lyte imbalance, ct head, xray shoulder, pelvis.
Pain not relieved by Medications/Fever greater than (need to indicate Fahrenheit or Celsius)/Swelling that gets worse/Bleeding that does not stop

## 2023-07-02 NOTE — ED PROVIDER NOTE - NSFOLLOWUPINSTRUCTIONS_ED_ALL_ED_FT
Urinary Tract Infection    A urinary tract infection (UTI) is an infection of any part of the urinary tract, which includes the kidneys, ureters, bladder, and urethra. Risk factors include ignoring your need to urinate, wiping back to front if female, being an uncircumcised male, and having diabetes or a weak immune system. Symptoms include frequent urination, pain or burning with urination, foul smelling urine, cloudy urine, pain in the lower abdomen, blood in the urine, and fever. If you were prescribed an antibiotic medicine, take it as told by your health care provider. Do not stop taking the antibiotic even if you start to feel better.    SEEK IMMEDIATE MEDICAL CARE IF YOU HAVE ANY OF THE FOLLOWING SYMPTOMS: severe back or abdominal pain, fever, inability to keep fluids or medicine down, dizziness/lightheadedness, or a change in mental status.    please take antibiotics as prescribed.

## 2023-07-02 NOTE — ED PROVIDER NOTE - OBJECTIVE STATEMENT
89-year-old female with history of dementia presents from nursing facility for an unwitnessed fall with right temporal contusion.  She cannot recall when she fell or the circumstances surrounding her fall. ROS negative for CP, SOB, HA, vision changes/hearing changes, abdominal pain, or nauseousness. HPI limited due to patient's mental status.

## 2023-07-02 NOTE — ED PROVIDER NOTE - CARE PLAN
1 Principal Discharge DX:	Closed head injury   Principal Discharge DX:	Closed head injury  Secondary Diagnosis:	Acute UTI

## 2023-07-04 LAB
CULTURE RESULTS: SIGNIFICANT CHANGE UP
SPECIMEN SOURCE: SIGNIFICANT CHANGE UP

## 2023-07-04 NOTE — ED POST DISCHARGE NOTE - RESULT SUMMARY
UCX contaminated but pt treated properly with 3rd gen cephalosporin which typically provides adequate coverage. Will clear results - Cee Harry PA-C

## 2024-04-22 ENCOUNTER — INPATIENT (INPATIENT)
Facility: HOSPITAL | Age: 89
LOS: 7 days | Discharge: NURSING, REHAB OR LONG-TERM CARE | End: 2024-04-30
Attending: STUDENT IN AN ORGANIZED HEALTH CARE EDUCATION/TRAINING PROGRAM | Admitting: STUDENT IN AN ORGANIZED HEALTH CARE EDUCATION/TRAINING PROGRAM
Payer: MEDICARE

## 2024-04-22 VITALS
TEMPERATURE: 98 F | RESPIRATION RATE: 18 BRPM | HEART RATE: 91 BPM | OXYGEN SATURATION: 97 % | SYSTOLIC BLOOD PRESSURE: 132 MMHG | DIASTOLIC BLOOD PRESSURE: 74 MMHG

## 2024-04-22 DIAGNOSIS — Z41.1 ENCOUNTER FOR COSMETIC SURGERY: Chronic | ICD-10-CM

## 2024-04-22 DIAGNOSIS — H26.9 UNSPECIFIED CATARACT: Chronic | ICD-10-CM

## 2024-04-22 LAB
ALBUMIN SERPL ELPH-MCNC: 3.4 G/DL — SIGNIFICANT CHANGE UP (ref 3.3–5)
ALP SERPL-CCNC: 72 U/L — SIGNIFICANT CHANGE UP (ref 40–120)
ALT FLD-CCNC: 10 U/L — SIGNIFICANT CHANGE UP (ref 4–33)
ANION GAP SERPL CALC-SCNC: 14 MMOL/L — SIGNIFICANT CHANGE UP (ref 7–14)
APTT BLD: 25.2 SEC — SIGNIFICANT CHANGE UP (ref 24.5–35.6)
AST SERPL-CCNC: 19 U/L — SIGNIFICANT CHANGE UP (ref 4–32)
BASE EXCESS BLDV CALC-SCNC: 1.6 MMOL/L — SIGNIFICANT CHANGE UP (ref -2–3)
BASOPHILS # BLD AUTO: 0.04 K/UL — SIGNIFICANT CHANGE UP (ref 0–0.2)
BASOPHILS NFR BLD AUTO: 0.4 % — SIGNIFICANT CHANGE UP (ref 0–2)
BILIRUB SERPL-MCNC: <0.2 MG/DL — SIGNIFICANT CHANGE UP (ref 0.2–1.2)
BLOOD GAS VENOUS COMPREHENSIVE RESULT: SIGNIFICANT CHANGE UP
BUN SERPL-MCNC: 33 MG/DL — HIGH (ref 7–23)
CALCIUM SERPL-MCNC: 8.4 MG/DL — SIGNIFICANT CHANGE UP (ref 8.4–10.5)
CHLORIDE BLDV-SCNC: 103 MMOL/L — SIGNIFICANT CHANGE UP (ref 96–108)
CHLORIDE SERPL-SCNC: 103 MMOL/L — SIGNIFICANT CHANGE UP (ref 98–107)
CO2 BLDV-SCNC: 29.2 MMOL/L — HIGH (ref 22–26)
CO2 SERPL-SCNC: 20 MMOL/L — LOW (ref 22–31)
CREAT SERPL-MCNC: 1.61 MG/DL — HIGH (ref 0.5–1.3)
EGFR: 30 ML/MIN/1.73M2 — LOW
EOSINOPHIL # BLD AUTO: 0.43 K/UL — SIGNIFICANT CHANGE UP (ref 0–0.5)
EOSINOPHIL NFR BLD AUTO: 3.9 % — SIGNIFICANT CHANGE UP (ref 0–6)
GAS PNL BLDV: 135 MMOL/L — LOW (ref 136–145)
GLUCOSE BLDV-MCNC: 118 MG/DL — HIGH (ref 70–99)
GLUCOSE SERPL-MCNC: 113 MG/DL — HIGH (ref 70–99)
HCO3 BLDV-SCNC: 28 MMOL/L — SIGNIFICANT CHANGE UP (ref 22–29)
HCT VFR BLD CALC: 33.9 % — LOW (ref 34.5–45)
HCT VFR BLDA CALC: 35 % — SIGNIFICANT CHANGE UP (ref 34.5–46.5)
HGB BLD CALC-MCNC: 11.7 G/DL — SIGNIFICANT CHANGE UP (ref 11.7–16.1)
HGB BLD-MCNC: 10.6 G/DL — LOW (ref 11.5–15.5)
IANC: 7.54 K/UL — HIGH (ref 1.8–7.4)
IMM GRANULOCYTES NFR BLD AUTO: 0.5 % — SIGNIFICANT CHANGE UP (ref 0–0.9)
INR BLD: 0.92 RATIO — SIGNIFICANT CHANGE UP (ref 0.85–1.18)
LACTATE BLDV-MCNC: 1 MMOL/L — SIGNIFICANT CHANGE UP (ref 0.5–2)
LACTATE SERPL-SCNC: 0.9 MMOL/L — SIGNIFICANT CHANGE UP (ref 0.5–2)
LYMPHOCYTES # BLD AUTO: 1.35 K/UL — SIGNIFICANT CHANGE UP (ref 1–3.3)
LYMPHOCYTES # BLD AUTO: 12.2 % — LOW (ref 13–44)
MCHC RBC-ENTMCNC: 28 PG — SIGNIFICANT CHANGE UP (ref 27–34)
MCHC RBC-ENTMCNC: 31.3 GM/DL — LOW (ref 32–36)
MCV RBC AUTO: 89.7 FL — SIGNIFICANT CHANGE UP (ref 80–100)
MONOCYTES # BLD AUTO: 1.7 K/UL — HIGH (ref 0–0.9)
MONOCYTES NFR BLD AUTO: 15.3 % — HIGH (ref 2–14)
NEUTROPHILS # BLD AUTO: 7.54 K/UL — HIGH (ref 1.8–7.4)
NEUTROPHILS NFR BLD AUTO: 67.7 % — SIGNIFICANT CHANGE UP (ref 43–77)
NRBC # BLD: 0 /100 WBCS — SIGNIFICANT CHANGE UP (ref 0–0)
NRBC # FLD: 0 K/UL — SIGNIFICANT CHANGE UP (ref 0–0)
PCO2 BLDV: 49 MMHG — SIGNIFICANT CHANGE UP (ref 39–52)
PH BLDV: 7.36 — SIGNIFICANT CHANGE UP (ref 7.32–7.43)
PLATELET # BLD AUTO: 240 K/UL — SIGNIFICANT CHANGE UP (ref 150–400)
PO2 BLDV: 41 MMHG — SIGNIFICANT CHANGE UP (ref 25–45)
POTASSIUM BLDV-SCNC: 4.9 MMOL/L — SIGNIFICANT CHANGE UP (ref 3.5–5.1)
POTASSIUM SERPL-MCNC: 5.1 MMOL/L — SIGNIFICANT CHANGE UP (ref 3.5–5.3)
POTASSIUM SERPL-SCNC: 5.1 MMOL/L — SIGNIFICANT CHANGE UP (ref 3.5–5.3)
PROT SERPL-MCNC: 5.9 G/DL — LOW (ref 6–8.3)
PROTHROM AB SERPL-ACNC: 10.4 SEC — SIGNIFICANT CHANGE UP (ref 9.5–13)
RBC # BLD: 3.78 M/UL — LOW (ref 3.8–5.2)
RBC # FLD: 14.5 % — SIGNIFICANT CHANGE UP (ref 10.3–14.5)
SAO2 % BLDV: 63.5 % — LOW (ref 67–88)
SODIUM SERPL-SCNC: 137 MMOL/L — SIGNIFICANT CHANGE UP (ref 135–145)
WBC # BLD: 11.11 K/UL — HIGH (ref 3.8–10.5)
WBC # FLD AUTO: 11.11 K/UL — HIGH (ref 3.8–10.5)

## 2024-04-22 PROCEDURE — 71045 X-RAY EXAM CHEST 1 VIEW: CPT | Mod: 26

## 2024-04-22 PROCEDURE — 99285 EMERGENCY DEPT VISIT HI MDM: CPT | Mod: GC

## 2024-04-22 NOTE — ED ADULT NURSE NOTE - OBJECTIVE STATEMENT
Pt is alert and orientedx1 ambulatory at baseline with assistance (legally blind). [facility] [place] [place] for mechanical fall, urinary retention and weakness. Pt is lethargic but arousable, rectally afebrile. Pts caretaker says she didn't not have any LOC or head trauma from fall. Pt denies chest pain, shortness of breath, dyspnea on exertion, breathing is unlabored and even. Pt denies nausea, vomiting or diarrhea. 18G placed in left hand, labs drawn, awaiting further orders. Call bell within reach, bed in lowest position, will continue to monitor.

## 2024-04-22 NOTE — ED ADULT NURSE NOTE - CHIEF COMPLAINT QUOTE
Patient brought to ER from [facility] in [place] for generalized weakness, not sleeping well, Today she had a mechanical fall with no injury. No LOC. No trauma. Urinary retention times 1 day. Baseline is alert with dementia. Family at bedside. 88% without O2.  . 18 gauge to left hand. patient is legally blind.

## 2024-04-22 NOTE — ED ADULT NURSE NOTE - NSFALLRISKINTERV_ED_ALL_ED
Detail Level: Detailed
Assistance OOB with selected safe patient handling equipment if applicable/Communicate fall risk and risk factors to all staff, patient, and family/Monitor gait and stability/Provide patient with walking aids/Provide visual cue: yellow wristband, yellow gown, etc/Reinforce activity limits and safety measures with patient and family/Call bell, personal items and telephone in reach/Instruct patient to call for assistance before getting out of bed/chair/stretcher/Non-slip footwear applied when patient is off stretcher/[place] to call system/Physically safe environment - no spills, clutter or unnecessary equipment/Purposeful Proactive Rounding/Room/bathroom lighting operational, light cord in reach

## 2024-04-22 NOTE — ED ADULT TRIAGE NOTE - CHIEF COMPLAINT QUOTE
Patient brought to ER from [facility] in [place] for generalized weakness, not sleeping well, Today she had a mechanical fall with no injury. No LOC. No trauma. Urinary retention times 1 day. Baseline is alert with dementia. Family at bedside. 88% without O2.  . 18 gauge to left hand. patient is legally blind.

## 2024-04-23 DIAGNOSIS — F03.90 UNSPECIFIED DEMENTIA, UNSPECIFIED SEVERITY, WITHOUT BEHAVIORAL DISTURBANCE, PSYCHOTIC DISTURBANCE, MOOD DISTURBANCE, AND ANXIETY: ICD-10-CM
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
## 2024-04-23 DIAGNOSIS — E78.00 PURE HYPERCHOLESTEROLEMIA, UNSPECIFIED: ICD-10-CM

## 2024-04-23 DIAGNOSIS — R53.83 OTHER FATIGUE: ICD-10-CM

## 2024-04-23 DIAGNOSIS — Z79.899 OTHER LONG TERM (CURRENT) DRUG THERAPY: ICD-10-CM

## 2024-04-23 DIAGNOSIS — R33.9 RETENTION OF URINE, UNSPECIFIED: ICD-10-CM

## 2024-04-23 DIAGNOSIS — N18.9 CHRONIC KIDNEY DISEASE, UNSPECIFIED: ICD-10-CM

## 2024-04-23 LAB
ADD ON TEST-SPECIMEN IN LAB: SIGNIFICANT CHANGE UP
ANION GAP SERPL CALC-SCNC: 12 MMOL/L — SIGNIFICANT CHANGE UP (ref 7–14)
APPEARANCE UR: CLEAR — SIGNIFICANT CHANGE UP
BASOPHILS # BLD AUTO: 0.04 K/UL — SIGNIFICANT CHANGE UP (ref 0–0.2)
BASOPHILS NFR BLD AUTO: 0.4 % — SIGNIFICANT CHANGE UP (ref 0–2)
BILIRUB UR-MCNC: NEGATIVE — SIGNIFICANT CHANGE UP
BUN SERPL-MCNC: 27 MG/DL — HIGH (ref 7–23)
CALCIUM SERPL-MCNC: 8.8 MG/DL — SIGNIFICANT CHANGE UP (ref 8.4–10.5)
CHLORIDE SERPL-SCNC: 105 MMOL/L — SIGNIFICANT CHANGE UP (ref 98–107)
CO2 SERPL-SCNC: 23 MMOL/L — SIGNIFICANT CHANGE UP (ref 22–31)
COLOR SPEC: YELLOW — SIGNIFICANT CHANGE UP
CREAT SERPL-MCNC: 1.55 MG/DL — HIGH (ref 0.5–1.3)
DIFF PNL FLD: NEGATIVE — SIGNIFICANT CHANGE UP
EGFR: 31 ML/MIN/1.73M2 — LOW
EOSINOPHIL # BLD AUTO: 0.29 K/UL — SIGNIFICANT CHANGE UP (ref 0–0.5)
EOSINOPHIL NFR BLD AUTO: 3 % — SIGNIFICANT CHANGE UP (ref 0–6)
FLUAV AG NPH QL: SIGNIFICANT CHANGE UP
FLUBV AG NPH QL: SIGNIFICANT CHANGE UP
GLUCOSE SERPL-MCNC: 123 MG/DL — HIGH (ref 70–99)
GLUCOSE UR QL: NEGATIVE MG/DL — SIGNIFICANT CHANGE UP
HCT VFR BLD CALC: 35.5 % — SIGNIFICANT CHANGE UP (ref 34.5–45)
HGB BLD-MCNC: 11.5 G/DL — SIGNIFICANT CHANGE UP (ref 11.5–15.5)
IANC: 6.89 K/UL — SIGNIFICANT CHANGE UP (ref 1.8–7.4)
IMM GRANULOCYTES NFR BLD AUTO: 0.4 % — SIGNIFICANT CHANGE UP (ref 0–0.9)
KETONES UR-MCNC: NEGATIVE MG/DL — SIGNIFICANT CHANGE UP
LEUKOCYTE ESTERASE UR-ACNC: NEGATIVE — SIGNIFICANT CHANGE UP
LYMPHOCYTES # BLD AUTO: 1.23 K/UL — SIGNIFICANT CHANGE UP (ref 1–3.3)
LYMPHOCYTES # BLD AUTO: 12.6 % — LOW (ref 13–44)
MAGNESIUM SERPL-MCNC: 2.2 MG/DL — SIGNIFICANT CHANGE UP (ref 1.6–2.6)
MCHC RBC-ENTMCNC: 29.3 PG — SIGNIFICANT CHANGE UP (ref 27–34)
MCHC RBC-ENTMCNC: 32.4 GM/DL — SIGNIFICANT CHANGE UP (ref 32–36)
MCV RBC AUTO: 90.3 FL — SIGNIFICANT CHANGE UP (ref 80–100)
MONOCYTES # BLD AUTO: 1.29 K/UL — HIGH (ref 0–0.9)
MONOCYTES NFR BLD AUTO: 13.2 % — SIGNIFICANT CHANGE UP (ref 2–14)
NEUTROPHILS # BLD AUTO: 6.89 K/UL — SIGNIFICANT CHANGE UP (ref 1.8–7.4)
NEUTROPHILS NFR BLD AUTO: 70.4 % — SIGNIFICANT CHANGE UP (ref 43–77)
NITRITE UR-MCNC: NEGATIVE — SIGNIFICANT CHANGE UP
NRBC # BLD: 0 /100 WBCS — SIGNIFICANT CHANGE UP (ref 0–0)
NRBC # FLD: 0 K/UL — SIGNIFICANT CHANGE UP (ref 0–0)
PH UR: 6 — SIGNIFICANT CHANGE UP (ref 5–8)
PHOSPHATE SERPL-MCNC: 3.2 MG/DL — SIGNIFICANT CHANGE UP (ref 2.5–4.5)
PLATELET # BLD AUTO: 299 K/UL — SIGNIFICANT CHANGE UP (ref 150–400)
POTASSIUM SERPL-MCNC: 4.4 MMOL/L — SIGNIFICANT CHANGE UP (ref 3.5–5.3)
POTASSIUM SERPL-SCNC: 4.4 MMOL/L — SIGNIFICANT CHANGE UP (ref 3.5–5.3)
PROT UR-MCNC: NEGATIVE MG/DL — SIGNIFICANT CHANGE UP
RBC # BLD: 3.93 M/UL — SIGNIFICANT CHANGE UP (ref 3.8–5.2)
RBC # FLD: 14.3 % — SIGNIFICANT CHANGE UP (ref 10.3–14.5)
RSV RNA NPH QL NAA+NON-PROBE: SIGNIFICANT CHANGE UP
SARS-COV-2 RNA SPEC QL NAA+PROBE: SIGNIFICANT CHANGE UP
SODIUM SERPL-SCNC: 140 MMOL/L — SIGNIFICANT CHANGE UP (ref 135–145)
SP GR SPEC: 1.02 — SIGNIFICANT CHANGE UP (ref 1–1.03)
TROPONIN T, HIGH SENSITIVITY RESULT: 46 NG/L — SIGNIFICANT CHANGE UP
TSH SERPL-MCNC: 1.42 UIU/ML — SIGNIFICANT CHANGE UP (ref 0.27–4.2)
UROBILINOGEN FLD QL: 0.2 MG/DL — SIGNIFICANT CHANGE UP (ref 0.2–1)
VIT B12 SERPL-MCNC: 1551 PG/ML — HIGH (ref 200–900)
WBC # BLD: 9.78 K/UL — SIGNIFICANT CHANGE UP (ref 3.8–10.5)
WBC # FLD AUTO: 9.78 K/UL — SIGNIFICANT CHANGE UP (ref 3.8–10.5)

## 2024-04-23 PROCEDURE — 71250 CT THORAX DX C-: CPT | Mod: 26,MC

## 2024-04-23 PROCEDURE — 99223 1ST HOSP IP/OBS HIGH 75: CPT

## 2024-04-23 PROCEDURE — 74176 CT ABD & PELVIS W/O CONTRAST: CPT | Mod: 26,MC

## 2024-04-23 PROCEDURE — 76770 US EXAM ABDO BACK WALL COMP: CPT | Mod: 26

## 2024-04-23 PROCEDURE — 72125 CT NECK SPINE W/O DYE: CPT | Mod: 26,MC

## 2024-04-23 PROCEDURE — 70450 CT HEAD/BRAIN W/O DYE: CPT | Mod: 26,MC

## 2024-04-23 RX ORDER — SENNA PLUS 8.6 MG/1
2 TABLET ORAL
Refills: 0 | DISCHARGE

## 2024-04-23 RX ORDER — ERGOCALCIFEROL 1.25 MG/1
1 CAPSULE ORAL
Refills: 0 | DISCHARGE

## 2024-04-23 RX ORDER — PREGABALIN 225 MG/1
1 CAPSULE ORAL
Qty: 0 | Refills: 0 | DISCHARGE

## 2024-04-23 RX ORDER — DOCUSATE SODIUM 100 MG
1 CAPSULE ORAL
Refills: 0 | DISCHARGE

## 2024-04-23 RX ORDER — QUETIAPINE FUMARATE 200 MG/1
25 TABLET, FILM COATED ORAL
Refills: 0 | Status: DISCONTINUED | OUTPATIENT
Start: 2024-04-23 | End: 2024-04-30

## 2024-04-23 RX ORDER — TRAZODONE HCL 50 MG
0 TABLET ORAL
Refills: 0 | DISCHARGE

## 2024-04-23 RX ORDER — LANOLIN ALCOHOL/MO/W.PET/CERES
1 CREAM (GRAM) TOPICAL
Refills: 0 | DISCHARGE

## 2024-04-23 RX ORDER — ESCITALOPRAM OXALATE 10 MG/1
1 TABLET, FILM COATED ORAL
Qty: 0 | Refills: 0 | DISCHARGE

## 2024-04-23 RX ORDER — QUETIAPINE FUMARATE 200 MG/1
50 TABLET, FILM COATED ORAL AT BEDTIME
Refills: 0 | Status: DISCONTINUED | OUTPATIENT
Start: 2024-04-23 | End: 2024-04-23

## 2024-04-23 RX ORDER — ASCORBIC ACID 60 MG
1 TABLET,CHEWABLE ORAL
Qty: 0 | Refills: 0 | DISCHARGE

## 2024-04-23 RX ORDER — OLANZAPINE 15 MG/1
1 TABLET, FILM COATED ORAL
Refills: 0 | DISCHARGE

## 2024-04-23 RX ORDER — PREGABALIN 225 MG/1
1000 CAPSULE ORAL DAILY
Refills: 0 | Status: DISCONTINUED | OUTPATIENT
Start: 2024-04-23 | End: 2024-04-30

## 2024-04-23 RX ORDER — ACETAMINOPHEN 500 MG
650 TABLET ORAL EVERY 6 HOURS
Refills: 0 | Status: DISCONTINUED | OUTPATIENT
Start: 2024-04-23 | End: 2024-04-30

## 2024-04-23 RX ORDER — ACETAMINOPHEN 500 MG
2 TABLET ORAL
Refills: 0 | DISCHARGE

## 2024-04-23 RX ORDER — OLANZAPINE 15 MG/1
5 TABLET, FILM COATED ORAL
Refills: 0 | Status: DISCONTINUED | OUTPATIENT
Start: 2024-04-23 | End: 2024-04-30

## 2024-04-23 RX ORDER — SENNA PLUS 8.6 MG/1
2 TABLET ORAL AT BEDTIME
Refills: 0 | Status: DISCONTINUED | OUTPATIENT
Start: 2024-04-23 | End: 2024-04-30

## 2024-04-23 RX ORDER — FAMOTIDINE 10 MG/ML
20 INJECTION INTRAVENOUS DAILY
Refills: 0 | Status: DISCONTINUED | OUTPATIENT
Start: 2024-04-23 | End: 2024-04-30

## 2024-04-23 RX ORDER — HEPARIN SODIUM 5000 [USP'U]/ML
5000 INJECTION INTRAVENOUS; SUBCUTANEOUS EVERY 12 HOURS
Refills: 0 | Status: DISCONTINUED | OUTPATIENT
Start: 2024-04-23 | End: 2024-04-30

## 2024-04-23 RX ORDER — QUETIAPINE FUMARATE 200 MG/1
1 TABLET, FILM COATED ORAL
Refills: 0 | DISCHARGE

## 2024-04-23 RX ORDER — SIMVASTATIN 20 MG/1
1 TABLET, FILM COATED ORAL
Qty: 0 | Refills: 0 | DISCHARGE

## 2024-04-23 RX ORDER — OLANZAPINE 15 MG/1
5 TABLET, FILM COATED ORAL ONCE
Refills: 0 | Status: COMPLETED | OUTPATIENT
Start: 2024-04-23 | End: 2024-04-23

## 2024-04-23 RX ORDER — LANOLIN ALCOHOL/MO/W.PET/CERES
3 CREAM (GRAM) TOPICAL AT BEDTIME
Refills: 0 | Status: DISCONTINUED | OUTPATIENT
Start: 2024-04-23 | End: 2024-04-30

## 2024-04-23 RX ORDER — UBIDECARENONE 100 MG
1 CAPSULE ORAL
Qty: 0 | Refills: 0 | DISCHARGE

## 2024-04-23 RX ORDER — PREGABALIN 225 MG/1
1 CAPSULE ORAL
Refills: 0 | DISCHARGE

## 2024-04-23 RX ORDER — ACETAMINOPHEN 500 MG
2 TABLET ORAL
Qty: 0 | Refills: 0 | DISCHARGE

## 2024-04-23 RX ORDER — CICLESONIDE 37 UG/1
2 AEROSOL, METERED NASAL
Qty: 0 | Refills: 0 | DISCHARGE

## 2024-04-23 RX ORDER — QUETIAPINE FUMARATE 200 MG/1
50 TABLET, FILM COATED ORAL
Refills: 0 | Status: DISCONTINUED | OUTPATIENT
Start: 2024-04-23 | End: 2024-04-30

## 2024-04-23 RX ORDER — TRAZODONE HCL 50 MG
50 TABLET ORAL AT BEDTIME
Refills: 0 | Status: DISCONTINUED | OUTPATIENT
Start: 2024-04-23 | End: 2024-04-30

## 2024-04-23 RX ORDER — POLYETHYLENE GLYCOL 3350 17 G/17G
17 POWDER, FOR SOLUTION ORAL DAILY
Refills: 0 | Status: DISCONTINUED | OUTPATIENT
Start: 2024-04-23 | End: 2024-04-30

## 2024-04-23 RX ORDER — FOLIC ACID 0.8 MG
1 TABLET ORAL
Qty: 0 | Refills: 0 | DISCHARGE

## 2024-04-23 RX ADMIN — OLANZAPINE 5 MILLIGRAM(S): 15 TABLET, FILM COATED ORAL at 23:23

## 2024-04-23 RX ADMIN — SENNA PLUS 2 TABLET(S): 8.6 TABLET ORAL at 23:31

## 2024-04-23 RX ADMIN — HEPARIN SODIUM 5000 UNIT(S): 5000 INJECTION INTRAVENOUS; SUBCUTANEOUS at 19:33

## 2024-04-23 RX ADMIN — OLANZAPINE 5 MILLIGRAM(S): 15 TABLET, FILM COATED ORAL at 01:23

## 2024-04-23 RX ADMIN — Medication 50 MILLIGRAM(S): at 23:23

## 2024-04-23 NOTE — ED ADULT NURSE REASSESSMENT NOTE - NS ED NURSE REASSESS COMMENT FT1
Break RN note- Patient resting quietly in bed, breathing even and nonlabored. No acute distress. Patient appears comfortable. 1:1 at bedside. Safety maintained. Patient stable upon being taken to CT scan.

## 2024-04-23 NOTE — ED PROVIDER NOTE - NSICDXPASTMEDICALHX_GEN_ALL_CORE_FT
PAST MEDICAL HISTORY:  [place] (hard of hearing)     Hypercholesterolemia     Legally blind     Nystagmus ou

## 2024-04-23 NOTE — H&P ADULT - NSHPLABSRESULTS_GEN_ALL_CORE
.  LABS:                         11.5   9.78  )-----------( 299      ( 23 Apr 2024 15:51 )             35.5     04-23    140  |  105  |  27<H>  ----------------------------<  123<H>  4.4   |  23  |  1.55<H>    Ca    8.8      23 Apr 2024 15:51  Phos  3.2     04-23  Mg     2.20     04-23    TPro  5.9<L>  /  Alb  3.4  /  TBili  <0.2  /  DBili  x   /  AST  19  /  ALT  10  /  AlkPhos  72  04-22    PT/INR - ( 22 Apr 2024 22:30 )   PT: 10.4 sec;   INR: 0.92 ratio         PTT - ( 22 Apr 2024 22:30 )  PTT:25.2 sec  Urinalysis Basic - ( 23 Apr 2024 15:51 )    Color: x / Appearance: x / SG: x / pH: x  Gluc: 123 mg/dL / Ketone: x  / Bili: x / Urobili: x   Blood: x / Protein: x / Nitrite: x   Leuk Esterase: x / RBC: x / WBC x   Sq Epi: x / Non Sq Epi: x / Bacteria: x            RADIOLOGY, EKG & ADDITIONAL TESTS: Reviewed.

## 2024-04-23 NOTE — H&P ADULT - PROBLEM SELECTOR PLAN 4
Hx of dementia, resides in [facility] in dementia unit with 24/7 caregivers.   - Delirium precautions   - Caregivers at bedside, will continue to provide support while hospitalized   - Cont home Trazadone, Olanzapine and Seroquel  [ ] PT c/s

## 2024-04-23 NOTE — ED PROVIDER NOTE - PHYSICAL EXAMINATION
Prashant GUNN:  VITALS: Initial triage and subsequent vitals have been reviewed by me.  GEN APPEARANCE: alert, ill appearing .  HEAD: Atraumatic, normocephalic   EYES: PERRLa, EOMI, vision grossly intact.   EARS: Gross hearing intact.   NOSE: No nasal discharge, no external evidence of epistaxis.   NECK: Supple  CV: RRR, S1S2, no c/r/m/g. No cyanosis. Extremities warm, well perfused. Cap refill <2 seconds. No bruits.   LUNGS: CTAB. No wheezing. No rales. No rhonchi. No diminished breath sounds.   ABDOMEN: Soft, NTND. No guarding or rebound. No masses.   MSK/EXT: Spine appears normal, no spine point tenderness. No CVA ttp. Normal muscular development. Pelvis stable. No obvious joint or bony deformity, no peripheral edema.   NEURO: Alert, appears to be moving x4   SKIN: Normal color for race, warm, dry and intact. No evidence of rash.  PSYCH: dementia

## 2024-04-23 NOTE — H&P ADULT - NSHPREVIEWOFSYSTEMS_GEN_ALL_CORE
ROS:    Constitutional: [ ] fevers [ ] chills [ ] weight loss [ ] weight gain  HEENT: [ ] dry eyes [ ] eye irritation [ ] postnasal drip [ ] nasal congestion  CV: [ ] chest pain [ ] orthopnea [ ] palpitations [ ] murmur  Resp: [ ] cough [ ] shortness of breath [ ] dyspnea [ ] wheezing [ ] sputum [ ] hemoptysis  GI: [ ] nausea [ ] vomiting [ ] diarrhea [ ] constipation [ ] abd pain [ ] dysphagia   : [ ] dysuria [ ] nocturia [ ] hematuria [ ] increased urinary frequency  Musculoskeletal: [ ] back pain [ ] myalgias [ ] arthralgias [ ] fracture  Skin: [ ] rash [ ] itch  Neurological: [ ] headache [ ] dizziness [ ] syncope [ ] weakness [ ] numbness  Psychiatric: [ ] anxiety [ ] depression  Endocrine: [ ] diabetes [ ] thyroid problem  Hematologic/Lymphatic: [ ] anemia [ ] bleeding problem  Allergic/Immunologic: [ ] itchy eyes [ ] nasal discharge [ ] hives [ ] angioedema  [ ] All other systems negative  [x ] Unable to assess ROS because dementia, patient reporting no symptoms

## 2024-04-23 NOTE — H&P ADULT - PROBLEM SELECTOR PLAN 5
Medication rec done with facility- confirmed over the phone. Facility paperwork listing duplicate entries for Trazadone, Olanzapine, Senna and Melatonin. Confirmed that patient is only given these medications once at night.   Diet: Regular per facility   DVT ppx: Heparin   Code Status: DNR/DNI    Updated  Jacob [phone]  Updated family friend Padmini per  Jacob's request. Jacob would like Padmini to be updated as well during hospitalization as he is hard of hearing and has difficulty remembering details.

## 2024-04-23 NOTE — ED ADULT NURSE REASSESSMENT NOTE - NS ED NURSE REASSESS COMMENT FT1
Cervical collar continues tolerating well. PCA at bedside for 1:1 as per constant observation orders. Respirations even and unlabored with equal chest rise. No acute distress noted. Bed in lowest position, wheels locked, appropriate side rails up, fall precautions in effect, safety maintained. Awaiting further orders. Cervical collar continues tolerating well. Neuro intact. +pulses/movement in all 4 extremities. PCA at bedside for 1:1 as per constant observation orders. Respirations even and unlabored with equal chest rise. No acute distress noted. Bed in lowest position, wheels locked, appropriate side rails up, fall precautions in effect, safety maintained. Awaiting further orders.

## 2024-04-23 NOTE — ED ADULT NURSE REASSESSMENT NOTE - NS ED NURSE REASSESS COMMENT FT1
Pt A+Ox1.  Pt with private aide at bedside for safety.  Pt awaiting bed placement.  Pt lungs clear, equal and unlabored, abdomen soft.  Fall precautions maintained.  Will continue to monitor.

## 2024-04-23 NOTE — ED ADULT NURSE REASSESSMENT NOTE - NS ED NURSE REASSESS COMMENT FT1
Pt A+Ox1.  Pt awaiting bed placement in hospital.  Pt linen and diaper changed.  Pt turned and positioned in bed.  Personal aide remains at bedside for safety.  Will continue to monitor

## 2024-04-23 NOTE — CONSULT NOTE ADULT - ASSESSMENT
89yo F PMH dementia (DNR/DNI) presenting s/p fall at dementia care facility as well as with lethargy and decreased responsiveness. CT C spine showing chronic C1 right posterior arch deformity, no acute fracture

## 2024-04-23 NOTE — H&P ADULT - NSHPPHYSICALEXAM_GEN_ALL_CORE
GENERAL: NAD, resting in bed appears comfortable   HEAD:  Atraumatic, Normocephalic  EYES: EOMI, PERRLA, conjunctiva and sclera clear  NECK: Supple, No JVD  CHEST/LUNG: Clear to auscultation bilaterally; No wheeze  HEART: Regular rate and rhythm; No murmurs, rubs, or gallops  ABDOMEN: Soft, + Distended, no TTP elicited   EXTREMITIES:  2+ Peripheral Pulses, No clubbing, cyanosis, or edema  PSYCH: Alert, not oriented   NEUROLOGY: non-focal  SKIN: No rashes or lesions

## 2024-04-23 NOTE — H&P ADULT - NSHPSOURCEINFORD_GEN_ALL_CORE
Chart(s)/Patient/Spouse/Significant Other/Friend/Colleague/Home Health Aide or Other Non-Family Caregiver

## 2024-04-23 NOTE — ED ADULT NURSE REASSESSMENT NOTE - NSFALLHARMRISKINTERV_ED_ALL_ED
Assistance OOB with selected safe patient handling equipment if applicable/Assistance with ambulation/Communicate risk of Fall with Harm to all staff, patient, and family/Monitor gait and stability/Monitor for mental status changes and reorient to person, place, and time, as needed/Move patient closer to nursing station/within visual sight of ED staff/Provide patient with walking aids/Provide visual cue: red socks, yellow wristband, yellow gown, etc/Reinforce activity limits and safety measures with patient and family/Toileting schedule using arm’s reach rule for commode and bathroom/Use of alarms - bed, stretcher, chair and/or video monitoring/Bed in lowest position, wheels locked, appropriate side rails in place/Call bell, personal items and telephone in reach/Instruct patient to call for assistance before getting out of bed/chair/stretcher/Non-slip footwear applied when patient is off stretcher/[place] to call system/Physically safe environment - no spills, clutter or unnecessary equipment/Purposeful Proactive Rounding/Room/bathroom lighting operational, light cord in reach

## 2024-04-23 NOTE — ED ADULT NURSE REASSESSMENT NOTE - NS ED NURSE REASSESS COMMENT FT1
Pt A+Ox1.  Pt with 1:1 at bedside for safety.  Pt received with c-collar from home.  Pt lungs clear, equal and unlabored, abdomen soft.  Fall precautions maintained.  Will continue to monitor.

## 2024-04-23 NOTE — ED PROVIDER NOTE - CLINICAL SUMMARY MEDICAL DECISION MAKING FREE TEXT BOX
Prashant GUNN: Exam vital signs are stable nontoxic-appearing with physical exam as above, DDx concern for possible infectious metabolic etiology provoking symptoms, would also consider possible traumatic injury though mechanism of fall earlier today coupled with exam findings do not suggest obvious occult traumatic injury, would also consider worsening dementia now with failure to thrive component..  Will have goals of care discussion with family at bedside will check labs EKG will get CT chest abdomen pelvis as well as CT head, check urine to eval for pneumonia give meds as needed and reassess and anticipate admission.

## 2024-04-23 NOTE — H&P ADULT - PROBLEM SELECTOR PLAN 2
Facility and caregiver noting that patient has not urinated on day of presentation. Patient was straight catheterized overnight but does not mention if patient was retaining. Per RN, very little UOP today. If patient retaining, maybe 2/2 decreased mobility in setting of knee pain.   - Unable to do bladder scan in ED. Will place Huitron, family friend Padmini asked to be present to provide emotional support to patient so she does not need to be sedated   - Huitron   - Strict I/O's.   [ ] Renal US

## 2024-04-23 NOTE — H&P ADULT - PROBLEM SELECTOR PLAN 1
Varying accounts of lethargy- see HPI. Patient currently awake, responsive and close to baseline per caregiver at bedside.. Facility reporting she is lethargic in AM and then improves to baseline throughout the day.   - CT head/chest/abdomen without any acute findings- no infection, obstruction, edema noted  - Patient has been afebrile here, CCB and BMP within range for patient   [ ] TSH, B12 pending  - Cont home Olanzapine, Trazadone and Seroquel

## 2024-04-23 NOTE — H&P ADULT - NSICDXPASTMEDICALHX_GEN_ALL_CORE_FT
PAST MEDICAL HISTORY:  Chronic kidney disease (CKD)     [place] (hard of hearing)     Hypercholesterolemia     Legally blind     Nystagmus ou

## 2024-04-23 NOTE — H&P ADULT - ASSESSMENT
89 y.o. F w/ a hx of dementia, HLD, CKD, legally blind and hard of hearing presents from [facility], dementia unit for weakness, lethargy and urinary retention.

## 2024-04-23 NOTE — CONSULT NOTE ADULT - SUBJECTIVE AND OBJECTIVE BOX
NEUROSURGERY CONSULT    HPI: 89-year-old female history of dementia currently resides in a dementia care facility, presents with a chief complaint of increasing lethargy and weakness not ambulating decreased responsiveness over the last 4 days, patient is demented unable to provide comprehensive history at this time, friend of family and  at bedside providing history.  No objective fevers patient's not making any complaints known.   reports he was trying to help her walk today when she had a guided fall without head strike earlier today as well.  No rashes per family is normally able to walk with assistance with a walker.    RADIOLOGY:   < from: CT Cervical Spine No Cont (04.23.24 @ 03:31) >  CT HEAD:  No acute intracranial hemorrhage or mass effect.    CT CERVICAL SPINE:  No acute cervical spine fracture or evidence of traumatic malalignment.   Redemonstration of fracture deformities involving the anterior and right   posterior arches of C1.    Findings of no acute fractures discussed with Dr. Shirley by Dr. Gutierrez at   4:23 AM on 4/23/2024 with read back confirmation.    --- End of Report ---    < end of copied text >      MEDS:      Vital Signs Last 24 Hrs  T(C): 36.9 (23 Apr 2024 03:40), Max: 37.6 (22 Apr 2024 22:00)  T(F): 98.4 (23 Apr 2024 03:40), Max: 99.6 (22 Apr 2024 22:00)  HR: 88 (23 Apr 2024 03:40) (88 - 91)  BP: 127/70 (23 Apr 2024 03:40) (127/70 - 135/69)  BP(mean): --  RR: 16 (23 Apr 2024 03:40) (16 - 22)  SpO2: 100% (23 Apr 2024 03:40) (96% - 100%)    Parameters below as of 23 Apr 2024 03:40  Patient On (Oxygen Delivery Method): room air        LABS:                        10.6   11.11 )-----------( 240      ( 22 Apr 2024 22:30 )             33.9     04-22    137  |  103  |  33<H>  ----------------------------<  113<H>  5.1   |  20<L>  |  1.61<H>    Ca    8.4      22 Apr 2024 22:30    TPro  5.9<L>  /  Alb  3.4  /  TBili  <0.2  /  DBili  x   /  AST  19  /  ALT  10  /  AlkPhos  72  04-22    PT/INR - ( 22 Apr 2024 22:30 )   PT: 10.4 sec;   INR: 0.92 ratio         PTT - ( 22 Apr 2024 22:30 )  PTT:25.2 sec      PHYSICAL EXAM:  AOxO, not FC  In c-collar  PERRL  NUNEZ at minimum antigravity, unable to assess full motor strength as unable to FC  No hoffmans

## 2024-04-23 NOTE — ED ADULT NURSE REASSESSMENT NOTE - NS ED NURSE REASSESS COMMENT FT1
Pt family is supplying a private aide 24/7 while in the hospital to help pt with safety and prevent elopement.  Pts personal aide instructed to call hospital staff if she needs to step away from pts bedside for any reason.  Pts daughter and aide verbalized understanding.

## 2024-04-23 NOTE — ED PROVIDER NOTE - PROGRESS NOTE DETAILS
Received call from radiology patient has a C1 fracture suspect Juan fracture.  Patient moving all extremities 5 out of 5.  Placed in [place] J collar.  Neurosurgery consulted.  Pending final reads and admission.    (Saud Flores MD; attending emergency medicine and medical toxicology) [facility] PGY2: Labs remarkable for WBC 11.11, BUN/creatinine 33/1.61 (previously creatinine 1.41).  CT cervical spine remarkable for redemonstrated fracture deformities involving anterior and right posterior arches of C1 (chronic).  CT abdomen/pelvis remarkable for age-indeterminate compression deformity of T6.  Discussed findings with neurosurgery with no acute intervention.  Admitted to medicine. [place], PGY2: Patient signed out to me by day team.  On reevaluation, patient's  stating that she has been unsteady with difficulty walking over the past 3 days and worsening weakness.  She fluctuates between confusion and lucid occasionally tries to stand up without assistance.  She resides at a lockdown facility for dementia patients, however,  is concerned for her safety.    Labs remarkable for WBC 11.11, BUN/creatinine 33/1.61 (previously creatinine 1.41).  CT cervical spine remarkable for redemonstrated fracture deformities involving anterior and right posterior arches of C1 (chronic).  CT abdomen/pelvis remarkable for age-indeterminate compression deformity of T6.  Discussed findings with neurosurgery with no acute intervention.  Admitted to medicine.

## 2024-04-23 NOTE — ED PROVIDER NOTE - ATTENDING CONTRIBUTION TO CARE
I have personally performed a face to face medical and diagnostic evaluation of the patient. I have discussed with and reviewed the Resident's note and agree with the History, ROS, Physical Exam and MDM unless otherwise indicated. A brief summary of my personal evaluation and impression can be found below.    Prashant GUNN: Please see the HPI, ROS, PE and MDM as authored by me.

## 2024-04-23 NOTE — ED PROVIDER NOTE - OBJECTIVE STATEMENT
Prashant GUNN: 89-year-old female history of dementia currently resides in a dementia care facility, presents with a chief complaint of increasing lethargy and weakness not ambulating decreased responsiveness over the last 4 days, patient is demented unable to provide comprehensive history at this time, friend of family and  at bedside providing history.  No objective fevers patient's not making any complaints known.   reports he was trying to help her walk today when she had a guided fall without head strike earlier today as well.  No rashes per family is normally able to walk with assistance with a walker.

## 2024-04-23 NOTE — H&P ADULT - PROBLEM SELECTOR PLAN 3
CKD, Cr 1.4 in 2023, currently 1.5-1.6   - Avoid nephrotoxic medications   [ ] Renal US  - Encourage PO

## 2024-04-23 NOTE — H&P ADULT - HISTORY OF PRESENT ILLNESS
89 y.o. F w/ a hx of dementia, HLD, CKD, legally blind and hard of hearing presents from [facility], dementia unit for weakness, lethargy and urinary retention.   Patient with advanced dementia and unable to provide any history. History was obtained from her , her family friend Padmini and her caregiver, Lilia.   Patient has private hire caregivers who assist her at the [facility]- Lilia last saw patient on Thursday, was in her USOH. On Saturday, patient's  Jacob saw the patient and felt she was lethargic and appeared weaker, noted abdomen was distended. He was concerned she may have been overmedicated. Sx persisted on Sunday and then on Monday, day of presentation, patient continued to appear lethargic, weak and unable to ambulate independently. Lilia noted she had not urinated at all during the day. Unknown if she was urinating on Saturday and Sunday. Patient was walking with her , appeared unsteady and almost fell but Lilia caught her and lowered her to the ground. She was sent to the hospital.   Per facility, patient reported knee pain on Friday and was observed to be in pain while walking so she was intermittently using a wheelchair. They felt she was more sleepy in the daytime but awake later in the evening so they shifted her evening medications to 6pm from 10pm. They advised the patient's  not to ambulate her on Monday but the patient's  got the patient up and she slid to the floor so EMS was called. It was noted at that time that the patient had not urinated all day.   Patient was reportedly straight catheterized overnight in the ED though no documentation as to urine output. Patient had one slightly wet diaper today per RN     Per facility- knee pain, difficulty ambulating so she was using the wheelchair,

## 2024-04-23 NOTE — ED ADULT NURSE REASSESSMENT NOTE - NS ED NURSE REASSESS COMMENT FT1
Pt A+Ox1.  Huitron placed with family friend at bedside.  Pt tolerated well, did not require sedation.  300ml yellow urine drained right away.

## 2024-04-24 DIAGNOSIS — D64.9 ANEMIA, UNSPECIFIED: ICD-10-CM

## 2024-04-24 DIAGNOSIS — E04.1 NONTOXIC SINGLE THYROID NODULE: ICD-10-CM

## 2024-04-24 LAB
ANION GAP SERPL CALC-SCNC: 11 MMOL/L — SIGNIFICANT CHANGE UP (ref 7–14)
BUN SERPL-MCNC: 25 MG/DL — HIGH (ref 7–23)
CALCIUM SERPL-MCNC: 8.7 MG/DL — SIGNIFICANT CHANGE UP (ref 8.4–10.5)
CHLORIDE SERPL-SCNC: 107 MMOL/L — SIGNIFICANT CHANGE UP (ref 98–107)
CO2 SERPL-SCNC: 23 MMOL/L — SIGNIFICANT CHANGE UP (ref 22–31)
CREAT SERPL-MCNC: 1.36 MG/DL — HIGH (ref 0.5–1.3)
CULTURE RESULTS: SIGNIFICANT CHANGE UP
EGFR: 37 ML/MIN/1.73M2 — LOW
GLUCOSE SERPL-MCNC: 91 MG/DL — SIGNIFICANT CHANGE UP (ref 70–99)
HCT VFR BLD CALC: 34.2 % — LOW (ref 34.5–45)
HGB BLD-MCNC: 10.5 G/DL — LOW (ref 11.5–15.5)
MAGNESIUM SERPL-MCNC: 2.2 MG/DL — SIGNIFICANT CHANGE UP (ref 1.6–2.6)
MCHC RBC-ENTMCNC: 27.8 PG — SIGNIFICANT CHANGE UP (ref 27–34)
MCHC RBC-ENTMCNC: 30.7 GM/DL — LOW (ref 32–36)
MCV RBC AUTO: 90.5 FL — SIGNIFICANT CHANGE UP (ref 80–100)
NRBC # BLD: 0 /100 WBCS — SIGNIFICANT CHANGE UP (ref 0–0)
NRBC # FLD: 0 K/UL — SIGNIFICANT CHANGE UP (ref 0–0)
PHOSPHATE SERPL-MCNC: 3.1 MG/DL — SIGNIFICANT CHANGE UP (ref 2.5–4.5)
PLATELET # BLD AUTO: 274 K/UL — SIGNIFICANT CHANGE UP (ref 150–400)
POTASSIUM SERPL-MCNC: 4.7 MMOL/L — SIGNIFICANT CHANGE UP (ref 3.5–5.3)
POTASSIUM SERPL-SCNC: 4.7 MMOL/L — SIGNIFICANT CHANGE UP (ref 3.5–5.3)
RBC # BLD: 3.78 M/UL — LOW (ref 3.8–5.2)
RBC # FLD: 14.4 % — SIGNIFICANT CHANGE UP (ref 10.3–14.5)
SODIUM SERPL-SCNC: 141 MMOL/L — SIGNIFICANT CHANGE UP (ref 135–145)
SPECIMEN SOURCE: SIGNIFICANT CHANGE UP
WBC # BLD: 8.94 K/UL — SIGNIFICANT CHANGE UP (ref 3.8–10.5)
WBC # FLD AUTO: 8.94 K/UL — SIGNIFICANT CHANGE UP (ref 3.8–10.5)

## 2024-04-24 PROCEDURE — 99233 SBSQ HOSP IP/OBS HIGH 50: CPT

## 2024-04-24 PROCEDURE — 99497 ADVNCD CARE PLAN 30 MIN: CPT

## 2024-04-24 RX ADMIN — QUETIAPINE FUMARATE 25 MILLIGRAM(S): 200 TABLET, FILM COATED ORAL at 07:14

## 2024-04-24 RX ADMIN — FAMOTIDINE 20 MILLIGRAM(S): 10 INJECTION INTRAVENOUS at 11:28

## 2024-04-24 RX ADMIN — PREGABALIN 1000 MICROGRAM(S): 225 CAPSULE ORAL at 11:28

## 2024-04-24 RX ADMIN — SENNA PLUS 2 TABLET(S): 8.6 TABLET ORAL at 23:08

## 2024-04-24 RX ADMIN — OLANZAPINE 5 MILLIGRAM(S): 15 TABLET, FILM COATED ORAL at 23:09

## 2024-04-24 RX ADMIN — HEPARIN SODIUM 5000 UNIT(S): 5000 INJECTION INTRAVENOUS; SUBCUTANEOUS at 18:27

## 2024-04-24 RX ADMIN — Medication 50 MILLIGRAM(S): at 23:09

## 2024-04-24 RX ADMIN — HEPARIN SODIUM 5000 UNIT(S): 5000 INJECTION INTRAVENOUS; SUBCUTANEOUS at 06:16

## 2024-04-24 RX ADMIN — QUETIAPINE FUMARATE 50 MILLIGRAM(S): 200 TABLET, FILM COATED ORAL at 18:27

## 2024-04-24 RX ADMIN — POLYETHYLENE GLYCOL 3350 17 GRAM(S): 17 POWDER, FOR SOLUTION ORAL at 11:28

## 2024-04-24 NOTE — PHYSICAL THERAPY INITIAL EVALUATION ADULT - PERTINENT HX OF CURRENT PROBLEM, REHAB EVAL
89-year-old female history of dementia currently resides in a dementia care facility, presents with a chief complaint of increasing lethargy and weakness not ambulating decreased responsiveness over the last 4 days, patient is demented unable to provide comprehensive history at this time, friend of family and  at bedside providing history.  No objective fevers patient's not making any complaints known.   reports he was trying to help her walk today when she had a guided fall without head strike earlier today as well.  No rashes per family is normally able to walk with assistance with a walker.

## 2024-04-24 NOTE — PROGRESS NOTE ADULT - PROBLEM SELECTOR PLAN 7
Medication rec done with facility- confirmed over the phone. Facility paperwork listing duplicate entries for Trazadone, Olanzapine, Senna and Melatonin. Confirmed that patient is only given these medications once at night.   Diet: Regular per facility   DVT ppx: Heparin   Code Status: DNR/DNI, MOLST in chart     Jacob [phone]  Jacob would like family friend Padmini to be updated as well during hospitalization as he is hard of hearing and has difficulty remembering details.

## 2024-04-24 NOTE — CHART NOTE - NSCHARTNOTEFT_GEN_A_CORE
Code status discussed with  Jacob (HCP) and family friend Tata at bedside. ACP Carmina also present.  Pt is currently DNR/DNI. Jacob expressed that he thought the patient could regain mental status after a few compressions without being intubated. I explained that people who require CPR would not be able to breathe on their own and would need to be intubated.  Discussed that compressions could cause broken ribs and would be traumatic to patient.   I also explained that it would be unlikely for patient to be successfully resuscitated given her age and comorbidities.  Jacob states that the patient expressed she would not want extraordinary measures.   Pt to remain DNR/DNI.  Jacob and family friend Tata in agreement.

## 2024-04-24 NOTE — GOALS OF CARE CONVERSATION - ADVANCED CARE PLANNING - CONVERSATION DETAILS
Updated  who says that patient is not oriented to place and time at baseline. Described that lama was placed for urinary retention.  states that he wants patient to be FULL CODE instead of DNR. He would want chest compressions or intubation to be performed if clinically necessary.  car says that patient does not want feeding tube if needed and wouldn't want "wires all over her body." He would like Tata to be updated daily [phone]. Updated  who says that patient is not oriented to place and time at baseline. Described that lama was placed for urinary retention.  states that he wants patient to be FULL CODE instead of DNR. He would want chest compressions or intubation to be performed if clinically necessary.  car says that patient does not want feeding tube if needed and wouldn't want "wires all over her body." He would like Tata to be updated daily [phone].    Spoke to Tata who states that patient at "no point wants to be intubated." Family is on their way to the hospital to discuss in more detail and modify the MOLST.

## 2024-04-24 NOTE — PHYSICAL THERAPY INITIAL EVALUATION ADULT - ADDITIONAL COMMENTS
Pt aide at bedside to assist with history. Pt lives at an assisted living facility and has private aides all day everyday. As per EMR, pt had fallen and was lowered to the ground recently. Pt has been weak recently and unable to ambulate independently. As per aide, pt was normally able to ambulate with some assistance from aide prior to admission.   Post PT evaluation, pt left semi-supine, alarm on, call bell and remote within reach, all precautions maintained, NAD. RN aware.

## 2024-04-24 NOTE — PROGRESS NOTE ADULT - PROBLEM SELECTOR PLAN 2
Facility and caregiver noting that patient has not urinated on day of presentation. Patient was straight catheterized overnight and lama was placed

## 2024-04-24 NOTE — PROGRESS NOTE ADULT - PROBLEM SELECTOR PLAN 3
CKD, Cr 1.4 in 2023, Cr 1.36, at baseline  - Monitor BMP, monitor intake and output   - Avoid nephrotoxic medications

## 2024-04-24 NOTE — PROGRESS NOTE ADULT - TIME BILLING
50 minutes of total time dedicated to this patient visit today including preparing to see the patient (eg. review of tests), obtaining and/or reviewing separately obtained history, obtaining/reviewing vitals, performing a medically appropriate examination and/or evaluation, counseling and educating the patient/family/caregiver, reviewing previous notes and test results, and procedures, communicating with other health professionals (when not separately reported), and documenting clinical information in the electronic health record).

## 2024-04-24 NOTE — PROGRESS NOTE ADULT - PROBLEM SELECTOR PLAN 1
Patient currently awake, responsive and close to baseline per caregiver at bedside. Facility reporting she is lethargic in AM and then improves to baseline throughout the day.   - CT head/chest/abdomen without any acute findings- no infection, obstruction, edema noted  - Patient has been afebrile here, CCB and BMP within range for patient   - TSH, B12 reviewed  - Cont home Olanzapine, Trazadone and Seroquel

## 2024-04-25 LAB
ANION GAP SERPL CALC-SCNC: 11 MMOL/L — SIGNIFICANT CHANGE UP (ref 7–14)
BUN SERPL-MCNC: 26 MG/DL — HIGH (ref 7–23)
CALCIUM SERPL-MCNC: 9 MG/DL — SIGNIFICANT CHANGE UP (ref 8.4–10.5)
CHLORIDE SERPL-SCNC: 109 MMOL/L — HIGH (ref 98–107)
CO2 SERPL-SCNC: 22 MMOL/L — SIGNIFICANT CHANGE UP (ref 22–31)
CREAT SERPL-MCNC: 1.26 MG/DL — SIGNIFICANT CHANGE UP (ref 0.5–1.3)
EGFR: 40 ML/MIN/1.73M2 — LOW
GLUCOSE SERPL-MCNC: 90 MG/DL — SIGNIFICANT CHANGE UP (ref 70–99)
HCT VFR BLD CALC: 37.4 % — SIGNIFICANT CHANGE UP (ref 34.5–45)
HGB BLD-MCNC: 11.7 G/DL — SIGNIFICANT CHANGE UP (ref 11.5–15.5)
MAGNESIUM SERPL-MCNC: 2.2 MG/DL — SIGNIFICANT CHANGE UP (ref 1.6–2.6)
MCHC RBC-ENTMCNC: 29 PG — SIGNIFICANT CHANGE UP (ref 27–34)
MCHC RBC-ENTMCNC: 31.3 GM/DL — LOW (ref 32–36)
MCV RBC AUTO: 92.6 FL — SIGNIFICANT CHANGE UP (ref 80–100)
NRBC # BLD: 0 /100 WBCS — SIGNIFICANT CHANGE UP (ref 0–0)
NRBC # FLD: 0 K/UL — SIGNIFICANT CHANGE UP (ref 0–0)
PHOSPHATE SERPL-MCNC: 3.3 MG/DL — SIGNIFICANT CHANGE UP (ref 2.5–4.5)
PLATELET # BLD AUTO: 296 K/UL — SIGNIFICANT CHANGE UP (ref 150–400)
POTASSIUM SERPL-MCNC: 4.9 MMOL/L — SIGNIFICANT CHANGE UP (ref 3.5–5.3)
POTASSIUM SERPL-SCNC: 4.9 MMOL/L — SIGNIFICANT CHANGE UP (ref 3.5–5.3)
RBC # BLD: 4.04 M/UL — SIGNIFICANT CHANGE UP (ref 3.8–5.2)
RBC # FLD: 14.2 % — SIGNIFICANT CHANGE UP (ref 10.3–14.5)
SODIUM SERPL-SCNC: 142 MMOL/L — SIGNIFICANT CHANGE UP (ref 135–145)
WBC # BLD: 6.85 K/UL — SIGNIFICANT CHANGE UP (ref 3.8–10.5)
WBC # FLD AUTO: 6.85 K/UL — SIGNIFICANT CHANGE UP (ref 3.8–10.5)

## 2024-04-25 PROCEDURE — 99232 SBSQ HOSP IP/OBS MODERATE 35: CPT

## 2024-04-25 RX ORDER — SODIUM CHLORIDE 9 MG/ML
1000 INJECTION, SOLUTION INTRAVENOUS
Refills: 0 | Status: DISCONTINUED | OUTPATIENT
Start: 2024-04-25 | End: 2024-04-28

## 2024-04-25 RX ORDER — CHLORHEXIDINE GLUCONATE 213 G/1000ML
1 SOLUTION TOPICAL DAILY
Refills: 0 | Status: DISCONTINUED | OUTPATIENT
Start: 2024-04-25 | End: 2024-04-29

## 2024-04-25 RX ORDER — TAMSULOSIN HYDROCHLORIDE 0.4 MG/1
0.4 CAPSULE ORAL AT BEDTIME
Refills: 0 | Status: DISCONTINUED | OUTPATIENT
Start: 2024-04-25 | End: 2024-04-30

## 2024-04-25 RX ORDER — THIAMINE MONONITRATE (VIT B1) 100 MG
500 TABLET ORAL DAILY
Refills: 0 | Status: COMPLETED | OUTPATIENT
Start: 2024-04-25 | End: 2024-04-27

## 2024-04-25 RX ADMIN — Medication 105 MILLIGRAM(S): at 11:13

## 2024-04-25 RX ADMIN — TAMSULOSIN HYDROCHLORIDE 0.4 MILLIGRAM(S): 0.4 CAPSULE ORAL at 23:22

## 2024-04-25 RX ADMIN — CHLORHEXIDINE GLUCONATE 1 APPLICATION(S): 213 SOLUTION TOPICAL at 11:14

## 2024-04-25 RX ADMIN — OLANZAPINE 5 MILLIGRAM(S): 15 TABLET, FILM COATED ORAL at 23:02

## 2024-04-25 RX ADMIN — HEPARIN SODIUM 5000 UNIT(S): 5000 INJECTION INTRAVENOUS; SUBCUTANEOUS at 17:15

## 2024-04-25 RX ADMIN — HEPARIN SODIUM 5000 UNIT(S): 5000 INJECTION INTRAVENOUS; SUBCUTANEOUS at 06:49

## 2024-04-25 RX ADMIN — SODIUM CHLORIDE 60 MILLILITER(S): 9 INJECTION, SOLUTION INTRAVENOUS at 10:26

## 2024-04-25 RX ADMIN — POLYETHYLENE GLYCOL 3350 17 GRAM(S): 17 POWDER, FOR SOLUTION ORAL at 11:12

## 2024-04-25 RX ADMIN — Medication 50 MILLIGRAM(S): at 23:22

## 2024-04-25 RX ADMIN — PREGABALIN 1000 MICROGRAM(S): 225 CAPSULE ORAL at 11:11

## 2024-04-25 RX ADMIN — QUETIAPINE FUMARATE 25 MILLIGRAM(S): 200 TABLET, FILM COATED ORAL at 06:49

## 2024-04-25 RX ADMIN — FAMOTIDINE 20 MILLIGRAM(S): 10 INJECTION INTRAVENOUS at 11:12

## 2024-04-25 RX ADMIN — QUETIAPINE FUMARATE 50 MILLIGRAM(S): 200 TABLET, FILM COATED ORAL at 17:15

## 2024-04-25 NOTE — PROGRESS NOTE ADULT - PROBLEM SELECTOR PLAN 1
Varying accounts of lethargy- see HPI. Patient currently awake, responsive and close to baseline per caregiver at bedside.. Facility reporting she is lethargic in AM and then improves to baseline throughout the day.   - CT head/chest/abdomen without any acute findings- no infection, obstruction, edema noted  - Patient has been afebrile here, CCB and BMP within range for patient   [ ] TSH, B12 pending  - Cont home Olanzapine, Trazadone and Seroquel  - IVF hydration LR 60cc/h x1d  - IV thiamine x3 doses  - TOV tomorrow Varying accounts of lethargy- see HPI. Patient currently awake, responsive and close to baseline per caregiver at bedside.. Facility reporting she is lethargic in AM and then improves to baseline throughout the day.   - CT head/chest/abdomen without any acute findings- no infection, obstruction, edema noted,. Old compression deformity T6, C1  - Patient has been afebrile here, CCB and BMP within range for patient   [ ] TSH 1.42, B12 1551  - Cont home Olanzapine, Trazadone and Seroquel  - IVF hydration LR 60cc/h x1d  - IV thiamine x3 doses  - TOV tomorrow

## 2024-04-25 NOTE — PROGRESS NOTE ADULT - PROBLEM SELECTOR PLAN 2
Facility and caregiver noting that patient has not urinated on day of presentation. Patient was straight catheterized overnight but does not mention if patient was retaining. Per RN, very little UOP today. If patient retaining, maybe 2/2 decreased mobility in setting of knee pain.   - Unable to do bladder scan in ED. Will place Huitron, family friend Padmini asked to be present to provide emotional support to patient so she does not need to be sedated   - Huitron   - TOV tomorrow  - add flomax 0.4mg hs  - bowel regimen  - Strict I/O's.   [ ] Renal US Facility and caregiver noting that patient has not urinated on day of presentation. Patient was straight catheterized overnight but does not mention if patient was retaining. Per RN, very little UOP today. If patient retaining, maybe 2/2 decreased mobility in setting of knee pain.   - Unable to do bladder scan in ED. Will place Huitron, family friend Padmini asked to be present to provide emotional support to patient so she does not need to be sedated   - Huitron   - UA negative  - TOV tomorrow  - add flomax 0.4mg hs  - bowel regimen  - Strict I/O's.   [ ] Renal US

## 2024-04-25 NOTE — PROGRESS NOTE ADULT - PROBLEM SELECTOR PLAN 3
CKD, Cr 1.4 in 2023, currently 1.5-1.6   - Avoid nephrotoxic medications   [ ] Renal US no hydronephrosis, chronic medical renal disease   - Encourage PO  - gentle hydration

## 2024-04-25 NOTE — OCCUPATIONAL THERAPY INITIAL EVALUATION ADULT - GENERAL OBSERVATIONS, REHAB EVAL
01-Jan-2022
Patient received semisupine in bed in NAD; agreeable to participate in OT evaluation. +Huitron. +IV. Aide at bedside. BP: 135/77 mmHg.

## 2024-04-25 NOTE — OCCUPATIONAL THERAPY INITIAL EVALUATION ADULT - PERTINENT HX OF CURRENT PROBLEM, REHAB EVAL
89 year old female with history of dementia, HLD, CKD, legally blind and hard of hearing presents from [facility], dementia unit for weakness, lethargy and urinary retention.

## 2024-04-25 NOTE — OCCUPATIONAL THERAPY INITIAL EVALUATION ADULT - ADDITIONAL COMMENTS
Patient has home health aide assistance with all ADLs and mobility. Patient normally ambulates with 1 person assistance.

## 2024-04-26 LAB
24R-OH-CALCIDIOL SERPL-MCNC: 50.8 NG/ML — SIGNIFICANT CHANGE UP (ref 30–80)
ANION GAP SERPL CALC-SCNC: 11 MMOL/L — SIGNIFICANT CHANGE UP (ref 7–14)
BUN SERPL-MCNC: 35 MG/DL — HIGH (ref 7–23)
CALCIUM SERPL-MCNC: 9 MG/DL — SIGNIFICANT CHANGE UP (ref 8.4–10.5)
CHLORIDE SERPL-SCNC: 108 MMOL/L — HIGH (ref 98–107)
CO2 SERPL-SCNC: 23 MMOL/L — SIGNIFICANT CHANGE UP (ref 22–31)
CREAT SERPL-MCNC: 1.54 MG/DL — HIGH (ref 0.5–1.3)
EGFR: 31 ML/MIN/1.73M2 — LOW
GLUCOSE SERPL-MCNC: 90 MG/DL — SIGNIFICANT CHANGE UP (ref 70–99)
MAGNESIUM SERPL-MCNC: 2.1 MG/DL — SIGNIFICANT CHANGE UP (ref 1.6–2.6)
PHOSPHATE SERPL-MCNC: 3.3 MG/DL — SIGNIFICANT CHANGE UP (ref 2.5–4.5)
POTASSIUM SERPL-MCNC: 5.5 MMOL/L — HIGH (ref 3.5–5.3)
POTASSIUM SERPL-SCNC: 5.5 MMOL/L — HIGH (ref 3.5–5.3)
SODIUM SERPL-SCNC: 142 MMOL/L — SIGNIFICANT CHANGE UP (ref 135–145)

## 2024-04-26 PROCEDURE — 99232 SBSQ HOSP IP/OBS MODERATE 35: CPT

## 2024-04-26 RX ORDER — AMLODIPINE BESYLATE 2.5 MG/1
5 TABLET ORAL DAILY
Refills: 0 | Status: DISCONTINUED | OUTPATIENT
Start: 2024-04-26 | End: 2024-04-30

## 2024-04-26 RX ADMIN — OLANZAPINE 5 MILLIGRAM(S): 15 TABLET, FILM COATED ORAL at 22:20

## 2024-04-26 RX ADMIN — Medication 50 MILLIGRAM(S): at 22:19

## 2024-04-26 RX ADMIN — FAMOTIDINE 20 MILLIGRAM(S): 10 INJECTION INTRAVENOUS at 12:32

## 2024-04-26 RX ADMIN — PREGABALIN 1000 MICROGRAM(S): 225 CAPSULE ORAL at 12:32

## 2024-04-26 RX ADMIN — SENNA PLUS 2 TABLET(S): 8.6 TABLET ORAL at 22:20

## 2024-04-26 RX ADMIN — TAMSULOSIN HYDROCHLORIDE 0.4 MILLIGRAM(S): 0.4 CAPSULE ORAL at 22:20

## 2024-04-26 RX ADMIN — Medication 105 MILLIGRAM(S): at 12:31

## 2024-04-26 RX ADMIN — HEPARIN SODIUM 5000 UNIT(S): 5000 INJECTION INTRAVENOUS; SUBCUTANEOUS at 17:18

## 2024-04-26 RX ADMIN — HEPARIN SODIUM 5000 UNIT(S): 5000 INJECTION INTRAVENOUS; SUBCUTANEOUS at 07:02

## 2024-04-26 RX ADMIN — CHLORHEXIDINE GLUCONATE 1 APPLICATION(S): 213 SOLUTION TOPICAL at 12:35

## 2024-04-26 RX ADMIN — QUETIAPINE FUMARATE 50 MILLIGRAM(S): 200 TABLET, FILM COATED ORAL at 17:19

## 2024-04-26 RX ADMIN — AMLODIPINE BESYLATE 5 MILLIGRAM(S): 2.5 TABLET ORAL at 17:30

## 2024-04-26 RX ADMIN — QUETIAPINE FUMARATE 25 MILLIGRAM(S): 200 TABLET, FILM COATED ORAL at 07:01

## 2024-04-26 RX ADMIN — POLYETHYLENE GLYCOL 3350 17 GRAM(S): 17 POWDER, FOR SOLUTION ORAL at 12:33

## 2024-04-26 NOTE — PROGRESS NOTE ADULT - PROBLEM SELECTOR PLAN 1
Varying accounts of lethargy- see HPI. Patient currently awake, responsive and close to baseline per caregiver at bedside.. Facility reporting she is lethargic in AM and then improves to baseline throughout the day.   - CT head/chest/abdomen without any acute findings- no infection, obstruction, edema noted,. Old compression deformity T6, C1  - Patient has been afebrile here, CCB and BMP within range for patient   [ ] TSH 1.42, B12 1551  - Cont home Olanzapine, Trazadone and Seroquel  - IVF hydration LR 60cc/h x1d  - IV thiamine x3 doses  - TOV tomorrow Varying accounts of lethargy- see HPI. Patient currently awake, responsive and close to baseline per caregiver at bedside.. Facility reporting she is lethargic in AM and then improves to baseline throughout the day.   - CT head/chest/abdomen without any acute findings- no infection, obstruction, edema noted,. Old compression deformity T6, C1  - Patient has been afebrile here, CCB and BMP within range for patient   [ ] TSH 1.42, B12 1551  - Cont home Olanzapine, Trazadone and Seroquel  - s/p IVF  - IV thiamine x3 doses  - TOV tomorrow

## 2024-04-26 NOTE — PROGRESS NOTE ADULT - PROBLEM SELECTOR PLAN 2
Facility and caregiver noting that patient has not urinated on day of presentation. Patient was straight catheterized overnight but does not mention if patient was retaining. Per RN, very little UOP today. If patient retaining, maybe 2/2 decreased mobility in setting of knee pain.   - maintain lama today, TOV tomorrow night  - UA negative, UCx normal rodney  - TOV tomorrow  - c.w flomax 0.4mg hs  - bowel regimen, having BM  - Strict I/O's.   - Renal US no hydronephrosis, normal bladder on CT Facility and caregiver noting that patient has not urinated on day of presentation. Patient was straight catheterized overnight but does not mention if patient was retaining. Per RN, very little UOP today. If patient retaining, maybe 2/2 decreased mobility in setting of knee pain.   - maintain lama today, TOV tomorrow night  - UA negative, UCx normal rodney  - c.w flomax 0.4mg hs  - bowel regimen, having BM  - Strict I/O's.   - Renal US no hydronephrosis, normal bladder on CT

## 2024-04-26 NOTE — PROGRESS NOTE ADULT - PROBLEM SELECTOR PLAN 3
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

## 2024-04-27 LAB
ANION GAP SERPL CALC-SCNC: 12 MMOL/L — SIGNIFICANT CHANGE UP (ref 7–14)
BUN SERPL-MCNC: 28 MG/DL — HIGH (ref 7–23)
CALCIUM SERPL-MCNC: 9.1 MG/DL — SIGNIFICANT CHANGE UP (ref 8.4–10.5)
CHLORIDE SERPL-SCNC: 105 MMOL/L — SIGNIFICANT CHANGE UP (ref 98–107)
CO2 SERPL-SCNC: 23 MMOL/L — SIGNIFICANT CHANGE UP (ref 22–31)
CREAT SERPL-MCNC: 1.35 MG/DL — HIGH (ref 0.5–1.3)
EGFR: 37 ML/MIN/1.73M2 — LOW
GLUCOSE SERPL-MCNC: 79 MG/DL — SIGNIFICANT CHANGE UP (ref 70–99)
HCT VFR BLD CALC: 35.8 % — SIGNIFICANT CHANGE UP (ref 34.5–45)
HGB BLD-MCNC: 11.5 G/DL — SIGNIFICANT CHANGE UP (ref 11.5–15.5)
MAGNESIUM SERPL-MCNC: 2 MG/DL — SIGNIFICANT CHANGE UP (ref 1.6–2.6)
MCHC RBC-ENTMCNC: 29 PG — SIGNIFICANT CHANGE UP (ref 27–34)
MCHC RBC-ENTMCNC: 32.1 GM/DL — SIGNIFICANT CHANGE UP (ref 32–36)
MCV RBC AUTO: 90.4 FL — SIGNIFICANT CHANGE UP (ref 80–100)
NRBC # BLD: 0 /100 WBCS — SIGNIFICANT CHANGE UP (ref 0–0)
NRBC # FLD: 0 K/UL — SIGNIFICANT CHANGE UP (ref 0–0)
PHOSPHATE SERPL-MCNC: 3.6 MG/DL — SIGNIFICANT CHANGE UP (ref 2.5–4.5)
PLATELET # BLD AUTO: 303 K/UL — SIGNIFICANT CHANGE UP (ref 150–400)
POTASSIUM SERPL-MCNC: 5.1 MMOL/L — SIGNIFICANT CHANGE UP (ref 3.5–5.3)
POTASSIUM SERPL-SCNC: 5.1 MMOL/L — SIGNIFICANT CHANGE UP (ref 3.5–5.3)
RBC # BLD: 3.96 M/UL — SIGNIFICANT CHANGE UP (ref 3.8–5.2)
RBC # FLD: 14 % — SIGNIFICANT CHANGE UP (ref 10.3–14.5)
SODIUM SERPL-SCNC: 140 MMOL/L — SIGNIFICANT CHANGE UP (ref 135–145)
WBC # BLD: 8.61 K/UL — SIGNIFICANT CHANGE UP (ref 3.8–10.5)
WBC # FLD AUTO: 8.61 K/UL — SIGNIFICANT CHANGE UP (ref 3.8–10.5)

## 2024-04-27 PROCEDURE — 99232 SBSQ HOSP IP/OBS MODERATE 35: CPT

## 2024-04-27 RX ADMIN — FAMOTIDINE 20 MILLIGRAM(S): 10 INJECTION INTRAVENOUS at 12:36

## 2024-04-27 RX ADMIN — PREGABALIN 1000 MICROGRAM(S): 225 CAPSULE ORAL at 12:35

## 2024-04-27 RX ADMIN — QUETIAPINE FUMARATE 25 MILLIGRAM(S): 200 TABLET, FILM COATED ORAL at 06:08

## 2024-04-27 RX ADMIN — TAMSULOSIN HYDROCHLORIDE 0.4 MILLIGRAM(S): 0.4 CAPSULE ORAL at 22:46

## 2024-04-27 RX ADMIN — Medication 105 MILLIGRAM(S): at 12:36

## 2024-04-27 RX ADMIN — CHLORHEXIDINE GLUCONATE 1 APPLICATION(S): 213 SOLUTION TOPICAL at 12:37

## 2024-04-27 RX ADMIN — Medication 50 MILLIGRAM(S): at 22:46

## 2024-04-27 RX ADMIN — OLANZAPINE 5 MILLIGRAM(S): 15 TABLET, FILM COATED ORAL at 22:46

## 2024-04-27 RX ADMIN — POLYETHYLENE GLYCOL 3350 17 GRAM(S): 17 POWDER, FOR SOLUTION ORAL at 12:36

## 2024-04-27 RX ADMIN — AMLODIPINE BESYLATE 5 MILLIGRAM(S): 2.5 TABLET ORAL at 06:08

## 2024-04-27 RX ADMIN — HEPARIN SODIUM 5000 UNIT(S): 5000 INJECTION INTRAVENOUS; SUBCUTANEOUS at 18:19

## 2024-04-27 RX ADMIN — SENNA PLUS 2 TABLET(S): 8.6 TABLET ORAL at 22:46

## 2024-04-27 RX ADMIN — HEPARIN SODIUM 5000 UNIT(S): 5000 INJECTION INTRAVENOUS; SUBCUTANEOUS at 06:08

## 2024-04-27 RX ADMIN — QUETIAPINE FUMARATE 50 MILLIGRAM(S): 200 TABLET, FILM COATED ORAL at 18:19

## 2024-04-27 NOTE — PROGRESS NOTE ADULT - PROBLEM SELECTOR PLAN 3
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

## 2024-04-27 NOTE — PROGRESS NOTE ADULT - PROBLEM SELECTOR PLAN 1
Varying accounts of lethargy- see HPI. Patient currently awake, responsive and close to baseline per caregiver at bedside.. Facility reporting she is lethargic in AM and then improves to baseline throughout the day.   - CT head/chest/abdomen without any acute findings- no infection, obstruction, edema noted,. Old compression deformity T6, C1  - Patient has been afebrile here, CCB and BMP within range for patient   [ ] TSH 1.42, B12 1551  - Cont home Olanzapine, Trazadone and Seroquel  - s/p IVF  - given thiamine  - TOV tonight, serial bladder scan after Huitron removal

## 2024-04-27 NOTE — PROGRESS NOTE ADULT - PROBLEM SELECTOR PLAN 2
Facility and caregiver noting that patient has not urinated on day of presentation. Patient was straight catheterized overnight but does not mention if patient was retaining. Per RN, very little UOP today. If patient retaining, maybe 2/2 decreased mobility in setting of knee pain.   - Remove Huitron and TOV tonight   - UA negative, UCx normal rodney  - c.w flomax 0.4mg hs  - bowel regimen, having BM  - Strict I/O's.   - Renal US no hydronephrosis, normal bladder on CT

## 2024-04-28 ENCOUNTER — TRANSCRIPTION ENCOUNTER (OUTPATIENT)
Age: 89
End: 2024-04-28

## 2024-04-28 LAB
ANION GAP SERPL CALC-SCNC: 13 MMOL/L — SIGNIFICANT CHANGE UP (ref 7–14)
BUN SERPL-MCNC: 40 MG/DL — HIGH (ref 7–23)
CALCIUM SERPL-MCNC: 9.3 MG/DL — SIGNIFICANT CHANGE UP (ref 8.4–10.5)
CHLORIDE SERPL-SCNC: 103 MMOL/L — SIGNIFICANT CHANGE UP (ref 98–107)
CO2 SERPL-SCNC: 22 MMOL/L — SIGNIFICANT CHANGE UP (ref 22–31)
CREAT SERPL-MCNC: 1.54 MG/DL — HIGH (ref 0.5–1.3)
CULTURE RESULTS: SIGNIFICANT CHANGE UP
CULTURE RESULTS: SIGNIFICANT CHANGE UP
EGFR: 31 ML/MIN/1.73M2 — LOW
GLUCOSE SERPL-MCNC: 97 MG/DL — SIGNIFICANT CHANGE UP (ref 70–99)
MAGNESIUM SERPL-MCNC: 2 MG/DL — SIGNIFICANT CHANGE UP (ref 1.6–2.6)
PHOSPHATE SERPL-MCNC: 4.2 MG/DL — SIGNIFICANT CHANGE UP (ref 2.5–4.5)
POTASSIUM SERPL-MCNC: 5.3 MMOL/L — SIGNIFICANT CHANGE UP (ref 3.5–5.3)
POTASSIUM SERPL-SCNC: 5.3 MMOL/L — SIGNIFICANT CHANGE UP (ref 3.5–5.3)
SODIUM SERPL-SCNC: 138 MMOL/L — SIGNIFICANT CHANGE UP (ref 135–145)
SPECIMEN SOURCE: SIGNIFICANT CHANGE UP
SPECIMEN SOURCE: SIGNIFICANT CHANGE UP

## 2024-04-28 PROCEDURE — 99232 SBSQ HOSP IP/OBS MODERATE 35: CPT

## 2024-04-28 RX ORDER — SODIUM CHLORIDE 9 MG/ML
1000 INJECTION, SOLUTION INTRAVENOUS
Refills: 0 | Status: DISCONTINUED | OUTPATIENT
Start: 2024-04-28 | End: 2024-04-30

## 2024-04-28 RX ADMIN — HEPARIN SODIUM 5000 UNIT(S): 5000 INJECTION INTRAVENOUS; SUBCUTANEOUS at 06:00

## 2024-04-28 RX ADMIN — SODIUM CHLORIDE 50 MILLILITER(S): 9 INJECTION, SOLUTION INTRAVENOUS at 21:39

## 2024-04-28 RX ADMIN — PREGABALIN 1000 MICROGRAM(S): 225 CAPSULE ORAL at 11:47

## 2024-04-28 RX ADMIN — TAMSULOSIN HYDROCHLORIDE 0.4 MILLIGRAM(S): 0.4 CAPSULE ORAL at 21:38

## 2024-04-28 RX ADMIN — SENNA PLUS 2 TABLET(S): 8.6 TABLET ORAL at 21:39

## 2024-04-28 RX ADMIN — FAMOTIDINE 20 MILLIGRAM(S): 10 INJECTION INTRAVENOUS at 11:47

## 2024-04-28 RX ADMIN — QUETIAPINE FUMARATE 25 MILLIGRAM(S): 200 TABLET, FILM COATED ORAL at 06:18

## 2024-04-28 RX ADMIN — CHLORHEXIDINE GLUCONATE 1 APPLICATION(S): 213 SOLUTION TOPICAL at 11:48

## 2024-04-28 RX ADMIN — OLANZAPINE 5 MILLIGRAM(S): 15 TABLET, FILM COATED ORAL at 22:48

## 2024-04-28 RX ADMIN — AMLODIPINE BESYLATE 5 MILLIGRAM(S): 2.5 TABLET ORAL at 06:00

## 2024-04-28 RX ADMIN — SODIUM CHLORIDE 50 MILLILITER(S): 9 INJECTION, SOLUTION INTRAVENOUS at 10:09

## 2024-04-28 RX ADMIN — POLYETHYLENE GLYCOL 3350 17 GRAM(S): 17 POWDER, FOR SOLUTION ORAL at 11:47

## 2024-04-28 RX ADMIN — Medication 50 MILLIGRAM(S): at 21:38

## 2024-04-28 RX ADMIN — HEPARIN SODIUM 5000 UNIT(S): 5000 INJECTION INTRAVENOUS; SUBCUTANEOUS at 16:59

## 2024-04-28 RX ADMIN — QUETIAPINE FUMARATE 50 MILLIGRAM(S): 200 TABLET, FILM COATED ORAL at 17:00

## 2024-04-28 NOTE — PROGRESS NOTE ADULT - PROBLEM SELECTOR PLAN 1
Varying accounts of lethargy- see HPI. Patient currently awake, responsive and close to baseline per caregiver at bedside.. Facility reporting she is lethargic in AM and then improves to baseline throughout the day.   - CT head/chest/abdomen without any acute findings- no infection, obstruction, edema noted,. Old compression deformity T6, C1  - Patient has been afebrile here, CCB and BMP within range for patient   - TSH 1.42, B12 1551  - Cont home Olanzapine, Trazadone and Seroquel  - s/p IVF  - given thiamine  - Passed TOV 4/27, bladder scan prn, primafit   - Dispo: medically ready for DC to rehab on Monday, f/u SW

## 2024-04-28 NOTE — DISCHARGE NOTE PROVIDER - NSDCCPCAREPLAN_GEN_ALL_CORE_FT
PRINCIPAL DISCHARGE DIAGNOSIS  Diagnosis: Lethargy  Assessment and Plan of Treatment: You came into the hospital because you were more sleepy and were not able to urinate on your own. You had a Huitron catheter placed and were started on Flomax, a medication to help you urinate. You were also given laxatives to help you have a bowel movement. Your urinary catheter was removed and you were able to urinate on your own.     PRINCIPAL DISCHARGE DIAGNOSIS  Diagnosis: Lethargy  Assessment and Plan of Treatment: You came into the hospital because you were more sleepy and were not able to urinate on your own. You had a Huitron catheter placed and were started on Flomax, a medication to help you urinate. You were also given laxatives to help you have a bowel movement. Your urinary catheter was removed and you were able to urinate on your own.      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       Diagnosis: Hypertension  Assessment and Plan of Treatment: norvasc 5 mg daily

## 2024-04-28 NOTE — DISCHARGE NOTE PROVIDER - DETAILS OF MALNUTRITION DIAGNOSIS/DIAGNOSES
This patient has been assessed with a concern for Malnutrition and was treated during this hospitalization for the following Nutrition diagnosis/diagnoses:     -  04/29/2024: Severe protein-calorie malnutrition

## 2024-04-28 NOTE — DISCHARGE NOTE PROVIDER - ATTENDING DISCHARGE PHYSICAL EXAMINATION:
Gen: Laying in bed   CV: RRR, no m/r/g   Resp: CTABL, no w/r/r   Abdomen: Soft, non-tender, non-distended   Ext: No LE edema   Neuro: Alert, answers questions, hard of hearing 	  PASCUAL Rodrigues at bedside reports patient is at baseline

## 2024-04-28 NOTE — DISCHARGE NOTE PROVIDER - NSDCMRMEDTOKEN_GEN_ALL_CORE_FT
acetaminophen 325 mg oral tablet: 2 tab(s) orally every 6 hours as needed for  mild pain  cyanocobalamin 1000 mcg oral tablet: 1 tab(s) orally once a day  docusate sodium 100 mg oral tablet: 1 tab(s) orally once a day  ergocalciferol 50 mcg (2000 intl units) oral capsule: 1 cap(s) orally once a day  famotidine 10 mg oral tablet: 1 tab(s) orally once a day  GlycoLax oral powder for reconstitution: 17 gram(s) orally once a day  melatonin 10 mg oral capsule: 1 cap(s) orally once a day (at bedtime)  OLANZapine 5 mg oral tablet: 1 tab(s) orally once a day (at bedtime)  QUEtiapine 25 mg oral tablet: 1 tab(s) orally once a day  QUEtiapine 50 mg oral tablet: 1 tab(s) orally once a day (in the evening)  senna (sennosides) 8.6 mg oral tablet: 2 tab(s) orally once a day (at bedtime)  traZODone 50 mg oral tablet: orally once a day (at bedtime)   acetaminophen 325 mg oral tablet: 2 tab(s) orally every 6 hours as needed for  mild pain  cyanocobalamin 1000 mcg oral tablet: 1 tab(s) orally once a day  docusate sodium 100 mg oral tablet: 1 tab(s) orally once a day  ergocalciferol 50 mcg (2000 intl units) oral capsule: 1 cap(s) orally once a day  famotidine 10 mg oral tablet: 1 tab(s) orally once a day  GlycoLax oral powder for reconstitution: 17 gram(s) orally once a day  melatonin 10 mg oral capsule: 1 cap(s) orally once a day (at bedtime)  OLANZapine 5 mg oral tablet: 1 tab(s) orally once a day (at bedtime)  QUEtiapine 25 mg oral tablet: 1 tab(s) orally once a day  QUEtiapine 50 mg oral tablet: 1 tab(s) orally once a day (in the evening)  senna (sennosides) 8.6 mg oral tablet: 2 tab(s) orally once a day (at bedtime)  tamsulosin 0.4 mg oral capsule: 1 cap(s) orally once a day (at bedtime)  traZODone 50 mg oral tablet: orally once a day (at bedtime)   acetaminophen 325 mg oral tablet: 2 tab(s) orally every 6 hours as needed for  mild pain  amLODIPine 5 mg oral tablet: 1 tab(s) orally once a day  cyanocobalamin 1000 mcg oral tablet: 1 tab(s) orally once a day  docusate sodium 100 mg oral tablet: 1 tab(s) orally once a day  ergocalciferol 50 mcg (2000 intl units) oral capsule: 1 cap(s) orally once a day  famotidine 20 mg oral tablet: 1 tab(s) orally once a day  melatonin 10 mg oral capsule: 1 cap(s) orally once a day (at bedtime)  OLANZapine 5 mg oral tablet: 1 tab(s) orally once a day (at bedtime)  QUEtiapine 25 mg oral tablet: 1 tab(s) orally once a day  QUEtiapine 50 mg oral tablet: 1 tab(s) orally once a day (in the evening)  senna (sennosides) 8.6 mg oral tablet: 2 tab(s) orally once a day (at bedtime)  tamsulosin 0.4 mg oral capsule: 1 cap(s) orally once a day (at bedtime)  traZODone 50 mg oral tablet: orally once a day (at bedtime)

## 2024-04-28 NOTE — DISCHARGE NOTE PROVIDER - DID THE PATIENT PRESENT WITH OR WAS TREATED FOR MALNUTRITION DURING THIS ADMISSION
Yes...
Rt periorbital-2 dry rash noted infraorbit, 1 lat corner of eye, 1 supraorbit, no d/c, no erythema,   eyelid NT to palp.,   eye-cornea 6 o'clock abrasion, pt denies any eye pain, no dendrite seen

## 2024-04-28 NOTE — PROGRESS NOTE ADULT - PROBLEM SELECTOR PLAN 2
Facility and caregiver noting that patient has not urinated on day of presentation. Patient was straight catheterized overnight but does not mention if patient was retaining. Per RN, very little UOP today. If patient retaining, maybe 2/2 decreased mobility in setting of knee pain.   - Huitron removed   - UA negative, UCx normal rodney  - c.w flomax 0.4mg hs  - bowel regimen, having BM  - Strict I/O's.   - Renal US no hydronephrosis, normal bladder on CT

## 2024-04-28 NOTE — DISCHARGE NOTE PROVIDER - CARE PROVIDER_API CALL
Jemal Price  Internal Medicine  [address] [place] [id number]  Phone: [phone]  Fax: [phone]  Follow Up Time: 1 week

## 2024-04-28 NOTE — DISCHARGE NOTE PROVIDER - NSDCCPTREATMENT_GEN_ALL_CORE_FT
PRINCIPAL PROCEDURE  Procedure: CT chest and abdomen wo contrast  Findings and Treatment: CHEST:  LUNGS AND LARGE AIRWAYS: Patent central airways. Bibasilar dependent   atelectasis.  PLEURA: No pleural effusion.  VESSELS: Atherosclerosis of the thoracic aorta, which is normal in   caliber.  HEART: Cardiomegaly. Trace pericardial fluid. Coronary artery and mitral   calcification.  MEDIASTINUM AND SHELLI: No lymphadenopathy.  CHEST WALL AND LOWER NECK: Chronic-appearing fracture deformities of the   posterior right eighth through 10th ribs. No acute rib fractures   identified. Partially calcified hyperattenuating 1.6 cm right thyroid   lobe nodule.  ABDOMEN AND PELVIS:  LIVER: Within normal limits.  BILE DUCTS: Normal caliber.  GALLBLADDER: Within normal limits.  SPLEEN: Within normal limits.  PANCREAS: Within normal limits.  ADRENALS: Within normal limits.  KIDNEYS/URETERS: No hydronephrosis. Left renal cyst. Low-attenuation   lesion within the right kidney measuring slightly higher then simple   fluid.  BLADDER: Within normal limits.  REPRODUCTIVE ORGANS: Uterus and adnexa within normal limits.  BOWEL: No bowel obstruction. Appendix is normal. Colonic diverticulosis.  PERITONEUM: No ascites.  VESSELS: Atherosclerotic changes.  RETROPERITONEUM/LYMPH NODES: No lymphadenopathy.  ABDOMINAL WALL: Asymmetry and left iliopsoas musculature, left larger the   right, may be related to underlying scoliosis.  BONES: Age-indeterminate compression deformity of T6. Degenerative   changes. Thoracolumbar scoliosis. Status post right hip 3 pin ORIF.  IMPRESSION:  No acute traumatic sequelae within the chest or abdomen.  Age indeterminant compression deformity of T6.      SECONDARY PROCEDURE  Procedure: CT chest without contrast  Findings and Treatment: CHEST:  LUNGS AND LARGE AIRWAYS: Patent central airways. Bibasilar dependent   atelectasis.  PLEURA: No pleural effusion.  VESSELS: Atherosclerosis of the thoracic aorta, which is normal in   caliber.  HEART: Cardiomegaly. Trace pericardial fluid. Coronary artery and mitral   calcification.  MEDIASTINUM AND SHELLI: No lymphadenopathy.  CHEST WALL AND LOWER NECK: Chronic-appearing fracture deformities of the   posterior right eighth through 10th ribs. No acute rib fractures   identified. Partially calcified hyperattenuating 1.6 cm right thyroid   lobe nodule.  ABDOMEN AND PELVIS:  LIVER: Within normal limits.  BILE DUCTS: Normal caliber.  GALLBLADDER: Within normal limits.  SPLEEN: Within normal limits.  PANCREAS: Within normal limits.  ADRENALS: Within normal limits.  KIDNEYS/URETERS: No hydronephrosis. Left renal cyst. Low-attenuation   lesion within the right kidney measuring slightly higher then simple   fluid.  BLADDER: Within normal limits.  REPRODUCTIVE ORGANS: Uterus and adnexa within normal limits.  BOWEL: No bowel obstruction. Appendix is normal. Colonic diverticulosis.  PERITONEUM: No ascites.  VESSELS: Atherosclerotic changes.  RETROPERITONEUM/LYMPH NODES: No lymphadenopathy.  ABDOMINAL WALL: Asymmetry and left iliopsoas musculature, left larger the   right, may be related to underlying scoliosis.  BONES: Age-indeterminate compression deformity of T6. Degenerative   changes. Thoracolumbar scoliosis. Status post right hip 3 pin ORIF.  IMPRESSION:  No acute traumatic sequelae within the chest or abdomen.  Age indeterminant compression deformity of T6.  --- End of Report ---

## 2024-04-28 NOTE — DISCHARGE NOTE PROVIDER - HOSPITAL COURSE
89 y.o. F w/ a hx of dementia, HLD, CKD, legally blind and hard of hearing presents from group home, dementia unit for weakness, lethargy and urinary retention, s/p lama, now passed TOV       Problem/Plan - 1:  ·  Problem: Lethargy.   ·  Plan: Varying accounts of lethargy- see HPI. Patient currently awake, responsive and close to baseline per caregiver at bedside.. Facility reporting she is lethargic in AM and then improves to baseline throughout the day.   - CT head/chest/abdomen without any acute findings- no infection, obstruction, edema noted,. Old compression deformity T6, C1  - Patient has been afebrile here, CCB and BMP within range for patient   - TSH 1.42, B12 1551  - Cont home Olanzapine, Trazadone and Seroquel  - s/p IVF  - given thiamine  - Passed TOV 4/27, bladder scan prn, primafit   - Dispo: medically ready for DC to rehab on Monday, f/u SW.     Problem/Plan - 2:  ·  Problem: Urinary retention.   ·  Plan: Facility and caregiver noting that patient has not urinated on day of presentation. Patient was straight catheterized overnight but does not mention if patient was retaining. Per RN, very little UOP today. If patient retaining, maybe 2/2 decreased mobility in setting of knee pain.   - Lama removed   - UA negative, UCx normal rodney  - c.w flomax 0.4mg hs  - bowel regimen, having BM  - Strict I/O's.   - Renal US no hydronephrosis, normal bladder on CT.     Problem/Plan - 3:  ·  Problem: Chronic kidney disease, unspecified CKD stage.   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     Problem/Plan - 4:  ·  Problem: Dementia.   ·  Plan: Hx of dementia, resides in group home in dementia unit with 24/7 caregivers.   - Delirium precautions   - Caregivers at bedside, will continue to provide support while hospitalized   - Cont home Trazadone, Olanzapine and Seroquel  - PT recs rehab.   89 y.o. F w/ a hx of dementia, HLD, CKD, legally blind and hard of hearing presents from [facility], dementia unit for weakness, lethargy and urinary retention at [facility].   Patient underwent encephalopathy workup which was largely negative. Found to have urinary retention for which a Huitron was placed. She passed her TOV and MS returned to baseline. Patient was recommended for [facility] but family elected to have patient return to [facility] memory unit with HHA and PT.

## 2024-04-28 NOTE — PROGRESS NOTE ADULT - PROBLEM SELECTOR PLAN 3
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

## 2024-04-29 LAB
ANION GAP SERPL CALC-SCNC: 12 MMOL/L — SIGNIFICANT CHANGE UP (ref 7–14)
BUN SERPL-MCNC: 43 MG/DL — HIGH (ref 7–23)
CALCIUM SERPL-MCNC: 8.7 MG/DL — SIGNIFICANT CHANGE UP (ref 8.4–10.5)
CHLORIDE SERPL-SCNC: 104 MMOL/L — SIGNIFICANT CHANGE UP (ref 98–107)
CO2 SERPL-SCNC: 21 MMOL/L — LOW (ref 22–31)
CREAT SERPL-MCNC: 1.41 MG/DL — HIGH (ref 0.5–1.3)
EGFR: 35 ML/MIN/1.73M2 — LOW
GLUCOSE SERPL-MCNC: 99 MG/DL — SIGNIFICANT CHANGE UP (ref 70–99)
HCT VFR BLD CALC: 33.7 % — LOW (ref 34.5–45)
HGB BLD-MCNC: 10.7 G/DL — LOW (ref 11.5–15.5)
MAGNESIUM SERPL-MCNC: 1.9 MG/DL — SIGNIFICANT CHANGE UP (ref 1.6–2.6)
MCHC RBC-ENTMCNC: 28.6 PG — SIGNIFICANT CHANGE UP (ref 27–34)
MCHC RBC-ENTMCNC: 31.8 GM/DL — LOW (ref 32–36)
MCV RBC AUTO: 90.1 FL — SIGNIFICANT CHANGE UP (ref 80–100)
NRBC # BLD: 0 /100 WBCS — SIGNIFICANT CHANGE UP (ref 0–0)
NRBC # FLD: 0 K/UL — SIGNIFICANT CHANGE UP (ref 0–0)
PHOSPHATE SERPL-MCNC: 3.5 MG/DL — SIGNIFICANT CHANGE UP (ref 2.5–4.5)
PLATELET # BLD AUTO: 304 K/UL — SIGNIFICANT CHANGE UP (ref 150–400)
POTASSIUM SERPL-MCNC: 4.9 MMOL/L — SIGNIFICANT CHANGE UP (ref 3.5–5.3)
POTASSIUM SERPL-SCNC: 4.9 MMOL/L — SIGNIFICANT CHANGE UP (ref 3.5–5.3)
RBC # BLD: 3.74 M/UL — LOW (ref 3.8–5.2)
RBC # FLD: 14.2 % — SIGNIFICANT CHANGE UP (ref 10.3–14.5)
SODIUM SERPL-SCNC: 137 MMOL/L — SIGNIFICANT CHANGE UP (ref 135–145)
WBC # BLD: 8.59 K/UL — SIGNIFICANT CHANGE UP (ref 3.8–10.5)
WBC # FLD AUTO: 8.59 K/UL — SIGNIFICANT CHANGE UP (ref 3.8–10.5)

## 2024-04-29 PROCEDURE — 99232 SBSQ HOSP IP/OBS MODERATE 35: CPT

## 2024-04-29 RX ORDER — CHLORHEXIDINE GLUCONATE 213 G/1000ML
1 SOLUTION TOPICAL DAILY
Refills: 0 | Status: DISCONTINUED | OUTPATIENT
Start: 2024-04-29 | End: 2024-04-30

## 2024-04-29 RX ADMIN — PREGABALIN 1000 MICROGRAM(S): 225 CAPSULE ORAL at 11:08

## 2024-04-29 RX ADMIN — OLANZAPINE 5 MILLIGRAM(S): 15 TABLET, FILM COATED ORAL at 22:04

## 2024-04-29 RX ADMIN — SENNA PLUS 2 TABLET(S): 8.6 TABLET ORAL at 22:04

## 2024-04-29 RX ADMIN — AMLODIPINE BESYLATE 5 MILLIGRAM(S): 2.5 TABLET ORAL at 06:04

## 2024-04-29 RX ADMIN — CHLORHEXIDINE GLUCONATE 1 APPLICATION(S): 213 SOLUTION TOPICAL at 11:08

## 2024-04-29 RX ADMIN — QUETIAPINE FUMARATE 50 MILLIGRAM(S): 200 TABLET, FILM COATED ORAL at 18:18

## 2024-04-29 RX ADMIN — SODIUM CHLORIDE 50 MILLILITER(S): 9 INJECTION, SOLUTION INTRAVENOUS at 06:05

## 2024-04-29 RX ADMIN — FAMOTIDINE 20 MILLIGRAM(S): 10 INJECTION INTRAVENOUS at 11:08

## 2024-04-29 RX ADMIN — HEPARIN SODIUM 5000 UNIT(S): 5000 INJECTION INTRAVENOUS; SUBCUTANEOUS at 06:04

## 2024-04-29 RX ADMIN — TAMSULOSIN HYDROCHLORIDE 0.4 MILLIGRAM(S): 0.4 CAPSULE ORAL at 22:04

## 2024-04-29 RX ADMIN — Medication 50 MILLIGRAM(S): at 22:04

## 2024-04-29 RX ADMIN — QUETIAPINE FUMARATE 25 MILLIGRAM(S): 200 TABLET, FILM COATED ORAL at 06:04

## 2024-04-29 RX ADMIN — HEPARIN SODIUM 5000 UNIT(S): 5000 INJECTION INTRAVENOUS; SUBCUTANEOUS at 18:19

## 2024-04-29 NOTE — PROGRESS NOTE ADULT - SUBJECTIVE AND OBJECTIVE BOX
Dr. Tanisha June  Pager [phone]    PROGRESS NOTE:     Patient is a 89y old  Female who presents with a chief complaint of urinary retention, weakness, lethargy (26 Apr 2024 13:12)      SUBJECTIVE / OVERNIGHT EVENTS: denies chest pain or sob   ADDITIONAL REVIEW OF SYSTEMS: afebrile     MEDICATIONS  (STANDING):  amLODIPine   Tablet 5 milliGRAM(s) Oral daily  chlorhexidine 2% Cloths 1 Application(s) Topical daily  cyanocobalamin 1000 MICROGram(s) Oral daily  famotidine    Tablet 20 milliGRAM(s) Oral daily  heparin   Injectable 5000 Unit(s) SubCutaneous every 12 hours  lactated ringers. 1000 milliLiter(s) (60 mL/Hr) IV Continuous <Continuous>  OLANZapine 5 milliGRAM(s) Oral <User Schedule>  polyethylene glycol 3350 17 Gram(s) Oral daily  QUEtiapine 25 milliGRAM(s) Oral <User Schedule>  QUEtiapine 50 milliGRAM(s) Oral <User Schedule>  senna 2 Tablet(s) Oral at bedtime  tamsulosin 0.4 milliGRAM(s) Oral at bedtime  traZODone 50 milliGRAM(s) Oral at bedtime    MEDICATIONS  (PRN):  acetaminophen     Tablet .. 650 milliGRAM(s) Oral every 6 hours PRN Temp greater or equal to 38C (100.4F), Mild Pain (1 - 3)  aluminum hydroxide/magnesium hydroxide/simethicone Suspension 30 milliLiter(s) Oral every 4 hours PRN Dyspepsia  melatonin 3 milliGRAM(s) Oral at bedtime PRN Insomnia      CAPILLARY BLOOD GLUCOSE        I&O's Summary    26 Apr 2024 07:01  -  27 Apr 2024 07:00  --------------------------------------------------------  IN: 1700 mL / OUT: 1175 mL / NET: 525 mL    27 Apr 2024 07:01  -  27 Apr 2024 15:05  --------------------------------------------------------  IN: 360 mL / OUT: 0 mL / NET: 360 mL        PHYSICAL EXAM:  Vital Signs Last 24 Hrs  T(C): 36.3 (27 Apr 2024 10:19), Max: 36.7 (26 Apr 2024 17:51)  T(F): 97.4 (27 Apr 2024 10:19), Max: 98.1 (26 Apr 2024 17:51)  HR: 91 (27 Apr 2024 10:19) (81 - 98)  BP: 111/59 (27 Apr 2024 10:19) (111/59 - 152/80)  BP(mean): --  RR: 18 (27 Apr 2024 10:19) (18 - 19)  SpO2: 96% (27 Apr 2024 10:19) (96% - 98%)    Parameters below as of 27 Apr 2024 10:19  Patient On (Oxygen Delivery Method): room air      CONSTITUTIONAL: nad, frail elderly female , hard of hearing  RESPIRATORY: Normal respiratory effort; lungs are clear to auscultation bilaterally  CARDIOVASCULAR: Regular rate and rhythm, normal S1 and S2, no murmur/rub/gallop; No lower extremity edema; Peripheral pulses are 2+ bilaterally  ABDOMEN: Nontender to palpation, normoactive bowel sounds, no rebound/guarding;   ; lama in place with clear yellow urine   MUSCULOSKELETAL: no clubbing or cyanosis of digits; no joint swelling or tenderness to palpation  Skin : sacral erythema   PSYCH: A/0x0-1, demented     LABS:                        11.5   8.61  )-----------( 303      ( 27 Apr 2024 04:30 )             35.8     04-27    140  |  105  |  28<H>  ----------------------------<  79  5.1   |  23  |  1.35<H>    Ca    9.1      27 Apr 2024 04:30  Phos  3.6     04-27  Mg     2.00     04-27            Urinalysis Basic - ( 27 Apr 2024 04:30 )    Color: x / Appearance: x / SG: x / pH: x  Gluc: 79 mg/dL / Ketone: x  / Bili: x / Urobili: x   Blood: x / Protein: x / Nitrite: x   Leuk Esterase: x / RBC: x / WBC x   Sq Epi: x / Non Sq Epi: x / Bacteria: x          RADIOLOGY & ADDITIONAL TESTS:  Results Reviewed:   Imaging Personally Reviewed:  Electrocardiogram Personally Reviewed:    COORDINATION OF CARE:  Care Discussed with Consultants/Other Providers [Y/N]:  Prior or Outpatient Records Reviewed [Y/N]:  
Dr. Tanisha June  Pager [phone]    PROGRESS NOTE:     Patient is a 89y old  Female who presents with a chief complaint of urinary retention, weakness, lethargy (25 Apr 2024 13:35)      SUBJECTIVE / OVERNIGHT EVENTS: denies chest pain or sob , had bm today  ADDITIONAL REVIEW OF SYSTEMS: afebrile     MEDICATIONS  (STANDING):  amLODIPine   Tablet 5 milliGRAM(s) Oral daily  chlorhexidine 2% Cloths 1 Application(s) Topical daily  cyanocobalamin 1000 MICROGram(s) Oral daily  famotidine    Tablet 20 milliGRAM(s) Oral daily  heparin   Injectable 5000 Unit(s) SubCutaneous every 12 hours  lactated ringers. 1000 milliLiter(s) (60 mL/Hr) IV Continuous <Continuous>  OLANZapine 5 milliGRAM(s) Oral <User Schedule>  polyethylene glycol 3350 17 Gram(s) Oral daily  QUEtiapine 25 milliGRAM(s) Oral <User Schedule>  QUEtiapine 50 milliGRAM(s) Oral <User Schedule>  senna 2 Tablet(s) Oral at bedtime  tamsulosin 0.4 milliGRAM(s) Oral at bedtime  thiamine IVPB 500 milliGRAM(s) IV Intermittent daily  traZODone 50 milliGRAM(s) Oral at bedtime    MEDICATIONS  (PRN):  acetaminophen     Tablet .. 650 milliGRAM(s) Oral every 6 hours PRN Temp greater or equal to 38C (100.4F), Mild Pain (1 - 3)  aluminum hydroxide/magnesium hydroxide/simethicone Suspension 30 milliLiter(s) Oral every 4 hours PRN Dyspepsia  melatonin 3 milliGRAM(s) Oral at bedtime PRN Insomnia      CAPILLARY BLOOD GLUCOSE      POCT Blood Glucose.: 135 mg/dL (26 Apr 2024 09:05)    I&O's Summary    25 Apr 2024 07:01  -  26 Apr 2024 07:00  --------------------------------------------------------  IN: 1560 mL / OUT: 625 mL / NET: 935 mL    26 Apr 2024 07:01  -  26 Apr 2024 13:12  --------------------------------------------------------  IN: 590 mL / OUT: 300 mL / NET: 290 mL        PHYSICAL EXAM:  Vital Signs Last 24 Hrs  T(C): 36.7 (26 Apr 2024 10:30), Max: 36.9 (25 Apr 2024 17:52)  T(F): 98 (26 Apr 2024 10:30), Max: 98.4 (25 Apr 2024 17:52)  HR: 98 (26 Apr 2024 10:30) (95 - 98)  BP: 149/65 (26 Apr 2024 10:30) (138/93 - 156/74)  BP(mean): --  RR: 18 (26 Apr 2024 10:30) (18 - 19)  SpO2: 99% (26 Apr 2024 10:30) (96% - 99%)    Parameters below as of 26 Apr 2024 10:30  Patient On (Oxygen Delivery Method): room air    CONSTITUTIONAL: nad, frail elderly female , hard of hearing  RESPIRATORY: Normal respiratory effort; lungs are clear to auscultation bilaterally  CARDIOVASCULAR: Regular rate and rhythm, normal S1 and S2, no murmur/rub/gallop; No lower extremity edema; Peripheral pulses are 2+ bilaterally  ABDOMEN: Nontender to palpation, normoactive bowel sounds, no rebound/guarding;   ; lama in place with clear yellow urine   MUSCULOSKELETAL: no clubbing or cyanosis of digits; no joint swelling or tenderness to palpation  Skin : sacral erythema   PSYCH: A/0x0-1, demented     LABS:                        11.7   6.85  )-----------( 296      ( 25 Apr 2024 06:51 )             37.4     04-26    142  |  108<H>  |  35<H>  ----------------------------<  90  5.5<H>   |  23  |  1.54<H>    Ca    9.0      26 Apr 2024 05:40  Phos  3.3     04-26  Mg     2.10     04-26      Urinalysis Basic - ( 26 Apr 2024 05:40 )    Color: x / Appearance: x / SG: x / pH: x  Gluc: 90 mg/dL / Ketone: x  / Bili: x / Urobili: x   Blood: x / Protein: x / Nitrite: x   Leuk Esterase: x / RBC: x / WBC x   Sq Epi: x / Non Sq Epi: x / Bacteria: x      RADIOLOGY & ADDITIONAL TESTS:  Results Reviewed:   Imaging Personally Reviewed:    Electrocardiogram Personally Reviewed:    COORDINATION OF CARE:  Care Discussed with Consultants/Other Providers [Y/N]:  Prior or Outpatient Records Reviewed [Y/N]:  
Dr. Tanisha June  Pager [phone]    PROGRESS NOTE:     Patient is a 89y old  Female who presents with a chief complaint of urinary retention, weakness, lethargy (27 Apr 2024 15:03)      SUBJECTIVE / OVERNIGHT EVENTS: denies chest pain or sob, ate breakfast per aide at bedside,   ADDITIONAL REVIEW OF SYSTEMS: lama removed, now has primafit draining clear yellow urine    MEDICATIONS  (STANDING):  amLODIPine   Tablet 5 milliGRAM(s) Oral daily  chlorhexidine 2% Cloths 1 Application(s) Topical daily  cyanocobalamin 1000 MICROGram(s) Oral daily  famotidine    Tablet 20 milliGRAM(s) Oral daily  heparin   Injectable 5000 Unit(s) SubCutaneous every 12 hours  lactated ringers. 1000 milliLiter(s) (50 mL/Hr) IV Continuous <Continuous>  OLANZapine 5 milliGRAM(s) Oral <User Schedule>  polyethylene glycol 3350 17 Gram(s) Oral daily  QUEtiapine 25 milliGRAM(s) Oral <User Schedule>  QUEtiapine 50 milliGRAM(s) Oral <User Schedule>  senna 2 Tablet(s) Oral at bedtime  tamsulosin 0.4 milliGRAM(s) Oral at bedtime  traZODone 50 milliGRAM(s) Oral at bedtime    MEDICATIONS  (PRN):  acetaminophen     Tablet .. 650 milliGRAM(s) Oral every 6 hours PRN Temp greater or equal to 38C (100.4F), Mild Pain (1 - 3)  aluminum hydroxide/magnesium hydroxide/simethicone Suspension 30 milliLiter(s) Oral every 4 hours PRN Dyspepsia  melatonin 3 milliGRAM(s) Oral at bedtime PRN Insomnia      CAPILLARY BLOOD GLUCOSE        I&O's Summary    27 Apr 2024 07:01  -  28 Apr 2024 07:00  --------------------------------------------------------  IN: 600 mL / OUT: 900 mL / NET: -300 mL        PHYSICAL EXAM:  Vital Signs Last 24 Hrs  T(C): 36.7 (28 Apr 2024 10:09), Max: 36.8 (27 Apr 2024 17:23)  T(F): 98.1 (28 Apr 2024 10:09), Max: 98.2 (27 Apr 2024 17:23)  HR: 89 (28 Apr 2024 10:09) (81 - 89)  BP: 120/51 (28 Apr 2024 10:09) (117/64 - 130/73)  BP(mean): --  RR: 18 (28 Apr 2024 10:09) (18 - 18)  SpO2: 99% (28 Apr 2024 10:09) (97% - 99%)    Parameters below as of 28 Apr 2024 10:09  Patient On (Oxygen Delivery Method): room air      CONSTITUTIONAL: nad, frail elderly female , hard of hearing  RESPIRATORY: Normal respiratory effort; lungs are clear to auscultation bilaterally  CARDIOVASCULAR: Regular rate and rhythm, normal S1 and S2, no murmur/rub/gallop; No lower extremity edema; Peripheral pulses are 2+ bilaterally  ABDOMEN: Nontender to palpation, normoactive bowel sounds, no rebound/guarding;   ; lama removed, primafit draining clear yellow urine  MUSCULOSKELETAL: no clubbing or cyanosis of digits; no joint swelling or tenderness to palpation  Skin : sacral erythema   PSYCH: A/0x0-1, demented       LABS:                        11.5   8.61  )-----------( 303      ( 27 Apr 2024 04:30 )             35.8     04-28    138  |  103  |  40<H>  ----------------------------<  97  5.3   |  22  |  1.54<H>    Ca    9.3      28 Apr 2024 07:52  Phos  4.2     04-28  Mg     2.00     04-28            Urinalysis Basic - ( 28 Apr 2024 07:52 )    Color: x / Appearance: x / SG: x / pH: x  Gluc: 97 mg/dL / Ketone: x  / Bili: x / Urobili: x   Blood: x / Protein: x / Nitrite: x   Leuk Esterase: x / RBC: x / WBC x   Sq Epi: x / Non Sq Epi: x / Bacteria: x          RADIOLOGY & ADDITIONAL TESTS:  Results Reviewed:   Imaging Personally Reviewed:    Electrocardiogram Personally Reviewed:    COORDINATION OF CARE:  Care Discussed with Consultants/Other Providers [Y/N]:  Prior or Outpatient Records Reviewed [Y/N]:  
Merlin Mathew, MD   Hospitalist  Pager #[phone]    PROGRESS NOTE:     Patient is a 89y old  Female who presents with a chief complaint of Per chart review, Pt  is 89 years old female with PMH: Dementia, HLD, CKD, legally blind and hard of hearing presents from [facility], dementia unit for weakness, lethargy and urinary retention, s/p lama, now passed TOV O. Admit diagnosis other fatigue.      (29 Apr 2024 12:37)      SUBJECTIVE / OVERNIGHT EVENTS: NEON   Patient not answering questions   HHA at bedside reports patient is at baseline MS     ADDITIONAL REVIEW OF SYSTEMS:    MEDICATIONS  (STANDING):  amLODIPine   Tablet 5 milliGRAM(s) Oral daily  chlorhexidine 2% Cloths 1 Application(s) Topical daily  cyanocobalamin 1000 MICROGram(s) Oral daily  famotidine    Tablet 20 milliGRAM(s) Oral daily  heparin   Injectable 5000 Unit(s) SubCutaneous every 12 hours  lactated ringers. 1000 milliLiter(s) (50 mL/Hr) IV Continuous <Continuous>  OLANZapine 5 milliGRAM(s) Oral <User Schedule>  polyethylene glycol 3350 17 Gram(s) Oral daily  QUEtiapine 25 milliGRAM(s) Oral <User Schedule>  QUEtiapine 50 milliGRAM(s) Oral <User Schedule>  senna 2 Tablet(s) Oral at bedtime  tamsulosin 0.4 milliGRAM(s) Oral at bedtime  traZODone 50 milliGRAM(s) Oral at bedtime    MEDICATIONS  (PRN):  acetaminophen     Tablet .. 650 milliGRAM(s) Oral every 6 hours PRN Temp greater or equal to 38C (100.4F), Mild Pain (1 - 3)  aluminum hydroxide/magnesium hydroxide/simethicone Suspension 30 milliLiter(s) Oral every 4 hours PRN Dyspepsia  melatonin 3 milliGRAM(s) Oral at bedtime PRN Insomnia      CAPILLARY BLOOD GLUCOSE        I&O's Summary    28 Apr 2024 07:01  -  29 Apr 2024 07:00  --------------------------------------------------------  IN: 2440 mL / OUT: 400 mL / NET: 2040 mL    29 Apr 2024 07:01  -  29 Apr 2024 15:44  --------------------------------------------------------  IN: 930 mL / OUT: 750 mL / NET: 180 mL        PHYSICAL EXAM:  Vital Signs Last 24 Hrs  T(C): 36.7 (29 Apr 2024 10:18), Max: 36.9 (29 Apr 2024 06:00)  T(F): 98.1 (29 Apr 2024 10:18), Max: 98.5 (29 Apr 2024 06:00)  HR: 81 (29 Apr 2024 10:18) (81 - 95)  BP: 108/75 (29 Apr 2024 10:18) (108/75 - 134/65)  BP(mean): --  RR: 18 (29 Apr 2024 10:18) (16 - 18)  SpO2: 97% (29 Apr 2024 10:18) (95% - 97%)    Parameters below as of 29 Apr 2024 10:18  Patient On (Oxygen Delivery Method): room air        CONSTITUTIONAL: NAD elderly woman, resting comfortably   RESPIRATORY: Normal respiratory effort; lungs are clear to auscultation bilaterally  CARDIOVASCULAR: Regular rate and rhythm, normal S1 and S2, no murmur/rub/gallop; No lower extremity edema  ABDOMEN: Nontender to palpation, normoactive bowel sounds, no rebound/guarding; No hepatosplenomegaly  MUSCLOSKELETAL: no clubbing or cyanosis of digits; no joint swelling or tenderness to palpation  PSYCH: Alert     LABS:                        10.7   8.59  )-----------( 304      ( 29 Apr 2024 06:05 )             33.7     04-29    137  |  104  |  43<H>  ----------------------------<  99  4.9   |  21<L>  |  1.41<H>    Ca    8.7      29 Apr 2024 06:05  Phos  3.5     04-29  Mg     1.90     04-29            Urinalysis Basic - ( 29 Apr 2024 06:05 )    Color: x / Appearance: x / SG: x / pH: x  Gluc: 99 mg/dL / Ketone: x  / Bili: x / Urobili: x   Blood: x / Protein: x / Nitrite: x   Leuk Esterase: x / RBC: x / WBC x   Sq Epi: x / Non Sq Epi: x / Bacteria: x          RADIOLOGY & ADDITIONAL TESTS:  Results Reviewed:   Imaging Personally Reviewed:  Electrocardiogram Personally Reviewed:    COORDINATION OF CARE:  Care Discussed with Consultants/Other Providers [Y/N]:  Prior or Outpatient Records Reviewed [Y/N]:  
Dr. Tanisha June  Pager [phone]    PROGRESS NOTE:     Patient is a 89y old  Female who presents with a chief complaint of urinary retention, weakness, lethargy (24 Apr 2024 15:04)      SUBJECTIVE / OVERNIGHT EVENTS: denies chest pain or sob , aide at bedside reports pt had difficulty voiding at home   ADDITIONAL REVIEW OF SYSTEMS: demented     MEDICATIONS  (STANDING):  chlorhexidine 2% Cloths 1 Application(s) Topical daily  cyanocobalamin 1000 MICROGram(s) Oral daily  famotidine    Tablet 20 milliGRAM(s) Oral daily  heparin   Injectable 5000 Unit(s) SubCutaneous every 12 hours  lactated ringers. 1000 milliLiter(s) (60 mL/Hr) IV Continuous <Continuous>  OLANZapine 5 milliGRAM(s) Oral <User Schedule>  polyethylene glycol 3350 17 Gram(s) Oral daily  QUEtiapine 25 milliGRAM(s) Oral <User Schedule>  QUEtiapine 50 milliGRAM(s) Oral <User Schedule>  senna 2 Tablet(s) Oral at bedtime  tamsulosin 0.4 milliGRAM(s) Oral at bedtime  thiamine IVPB 500 milliGRAM(s) IV Intermittent daily  traZODone 50 milliGRAM(s) Oral at bedtime    MEDICATIONS  (PRN):  acetaminophen     Tablet .. 650 milliGRAM(s) Oral every 6 hours PRN Temp greater or equal to 38C (100.4F), Mild Pain (1 - 3)  aluminum hydroxide/magnesium hydroxide/simethicone Suspension 30 milliLiter(s) Oral every 4 hours PRN Dyspepsia  melatonin 3 milliGRAM(s) Oral at bedtime PRN Insomnia      CAPILLARY BLOOD GLUCOSE        I&O's Summary    24 Apr 2024 07:01  -  25 Apr 2024 07:00  --------------------------------------------------------  IN: 250 mL / OUT: 1200 mL / NET: -950 mL    25 Apr 2024 07:01  -  25 Apr 2024 13:35  --------------------------------------------------------  IN: 200 mL / OUT: 200 mL / NET: 0 mL        PHYSICAL EXAM:  Vital Signs Last 24 Hrs  T(C): 36.5 (25 Apr 2024 10:09), Max: 36.8 (24 Apr 2024 16:12)  T(F): 97.7 (25 Apr 2024 10:09), Max: 98.3 (24 Apr 2024 16:12)  HR: 95 (25 Apr 2024 10:09) (81 - 95)  BP: 159/77 (25 Apr 2024 10:09) (102/74 - 159/77)  BP(mean): --  RR: 18 (25 Apr 2024 10:09) (18 - 19)  SpO2: 96% (25 Apr 2024 10:09) (96% - 100%)    Parameters below as of 25 Apr 2024 10:09  Patient On (Oxygen Delivery Method): room air      CONSTITUTIONAL: nad, frail elderly female   RESPIRATORY: Normal respiratory effort; lungs are clear to auscultation bilaterally  CARDIOVASCULAR: Regular rate and rhythm, normal S1 and S2, no murmur/rub/gallop; No lower extremity edema; Peripheral pulses are 2+ bilaterally  ABDOMEN: Nontender to palpation, normoactive bowel sounds, no rebound/guarding;   ; lama in place with clear yellow urine   MUSCULOSKELETAL: no clubbing or cyanosis of digits; no joint swelling or tenderness to palpation  PSYCH: A/0x0-1, demented   LABS:                        11.7   6.85  )-----------( 296      ( 25 Apr 2024 06:51 )             37.4     04-25    142  |  109<H>  |  26<H>  ----------------------------<  90  4.9   |  22  |  1.26    Ca    9.0      25 Apr 2024 06:51  Phos  3.3     04-25  Mg     2.20     04-25            Urinalysis Basic - ( 25 Apr 2024 06:51 )    Color: x / Appearance: x / SG: x / pH: x  Gluc: 90 mg/dL / Ketone: x  / Bili: x / Urobili: x   Blood: x / Protein: x / Nitrite: x   Leuk Esterase: x / RBC: x / WBC x   Sq Epi: x / Non Sq Epi: x / Bacteria: x        Culture - Urine (collected 22 Apr 2024 23:50)  Source: Clean Catch Clean Catch (Midstream)  Final Report (24 Apr 2024 07:19):    <10,000 CFU/mL Normal Urogenital Viktoriya    Culture - Blood (collected 22 Apr 2024 22:30)  Source: .Blood Blood-Peripheral  Preliminary Report (25 Apr 2024 02:02):    No growth at 48 Hours    Culture - Blood (collected 22 Apr 2024 22:30)  Source: .Blood Blood-Peripheral  Preliminary Report (25 Apr 2024 02:02):    No growth at 48 Hours        RADIOLOGY & ADDITIONAL TESTS:  Results Reviewed:   Imaging Personally Reviewed:  < from: US Kidney and Bladder (04.23.24 @ 17:57) >  IMPRESSION:  Echogenic renal parenchyma bilaterally, compatible with medical renal   disease.  No renal mass, hydronephrosis or calculus is visualized sonographically.  Bilateral renal cysts.  Layering debris in the bladder lumen may represent blood products,   sterile debris, and/or cystitis.  Correlate with urinalysis..      < from: CT Abdomen and Pelvis No Cont (04.23.24 @ 03:32) >  IMPRESSION:  No acute traumatic sequelae within the chest or abdomen.    Age indeterminant compression deformity of T6.      < from: CT Head No Cont (04.23.24 @ 03:32) >    CT HEAD:  No acute intracranial hemorrhage or mass effect.    CT CERVICAL SPINE:  No acute cervical spine fracture or evidence of traumatic malalignment.   Redemonstration of fracture deformities involving the anterior and right   posterior arches of C1.        Electrocardiogram Personally Reviewed:    COORDINATION OF CARE:  Care Discussed with Consultants/Other Providers [Y/N]:  Prior or Outpatient Records Reviewed [Y/N]:  
[facility] Division of Hospital Medicine  Frandy Stevens DO  Available via MS Teams  Pager:[phone]    SUBJECTIVE / OVERNIGHT EVENTS: No acute events overnight. Patient is A&Ox0. Responds to commands. Tries to shove examiner away upon attempting physical exam. Does not respond to questions. Aide is at bedside and says patient is not oriented to time or place at baseline.    ADDITIONAL REVIEW OF SYSTEMS:    MEDICATIONS  (STANDING):  cyanocobalamin 1000 MICROGram(s) Oral daily  famotidine    Tablet 20 milliGRAM(s) Oral daily  heparin   Injectable 5000 Unit(s) SubCutaneous every 12 hours  OLANZapine 5 milliGRAM(s) Oral <User Schedule>  polyethylene glycol 3350 17 Gram(s) Oral daily  QUEtiapine 25 milliGRAM(s) Oral <User Schedule>  QUEtiapine 50 milliGRAM(s) Oral <User Schedule>  senna 2 Tablet(s) Oral at bedtime  traZODone 50 milliGRAM(s) Oral at bedtime    MEDICATIONS  (PRN):  acetaminophen     Tablet .. 650 milliGRAM(s) Oral every 6 hours PRN Temp greater or equal to 38C (100.4F), Mild Pain (1 - 3)  aluminum hydroxide/magnesium hydroxide/simethicone Suspension 30 milliLiter(s) Oral every 4 hours PRN Dyspepsia  melatonin 3 milliGRAM(s) Oral at bedtime PRN Insomnia      I&O's Summary    23 Apr 2024 07:01  -  24 Apr 2024 07:00  --------------------------------------------------------  IN: 0 mL / OUT: 520 mL / NET: -520 mL        PHYSICAL EXAM:  Vital Signs Last 24 Hrs  T(C): 36.8 (24 Apr 2024 11:18), Max: 37 (23 Apr 2024 21:35)  T(F): 98.2 (24 Apr 2024 11:18), Max: 98.6 (23 Apr 2024 21:35)  HR: 92 (24 Apr 2024 11:18) (78 - 96)  BP: 160/69 (24 Apr 2024 11:18) (138/72 - 165/77)  BP(mean): --  RR: 20 (24 Apr 2024 11:18) (18 - 20)  SpO2: 98% (24 Apr 2024 11:18) (95% - 98%)    Parameters below as of 24 Apr 2024 11:18  Patient On (Oxygen Delivery Method): room air      CONSTITUTIONAL: NAD, well-groomed  EYES: PERRLA; conjunctiva and sclera clear  ENMT: Moist oral mucosa, no pharyngeal injection or exudates; normal dentition  NECK: Supple, no palpable masses; no thyromegaly  RESPIRATORY: Normal respiratory effort; lungs are clear to auscultation bilaterally  CARDIOVASCULAR: Regular rate and rhythm, normal S1 and S2, no murmur/rub/gallop; No lower extremity edema; Peripheral pulses are 2+ bilaterally  ABDOMEN: Nontender to palpation, normoactive bowel sounds, no rebound/guarding; No hepatosplenomegaly  MUSCULOSKELETAL:  Normal gait; no clubbing or cyanosis of digits; no joint swelling or tenderness to palpation  NEUROLOGY: CN 2-12 are intact and symmetric; no gross sensory deficits, A&Ox0, responsive to commands  SKIN: No rashes; no palpable lesions    LABS:                        10.5   8.94  )-----------( 274      ( 24 Apr 2024 06:01 )             34.2     04-24    141  |  107  |  25<H>  ----------------------------<  91  4.7   |  23  |  1.36<H>    Ca    8.7      24 Apr 2024 06:01  Phos  3.1     04-24  Mg     2.20     04-24    TPro  5.9<L>  /  Alb  3.4  /  TBili  <0.2  /  DBili  x   /  AST  19  /  ALT  10  /  AlkPhos  72  04-22    PT/INR - ( 22 Apr 2024 22:30 )   PT: 10.4 sec;   INR: 0.92 ratio         PTT - ( 22 Apr 2024 22:30 )  PTT:25.2 sec      Urinalysis Basic - ( 24 Apr 2024 06:01 )    Color: x / Appearance: x / SG: x / pH: x  Gluc: 91 mg/dL / Ketone: x  / Bili: x / Urobili: x   Blood: x / Protein: x / Nitrite: x   Leuk Esterase: x / RBC: x / WBC x   Sq Epi: x / Non Sq Epi: x / Bacteria: x        Culture - Urine (collected 22 Apr 2024 23:50)  Source: Clean Catch Clean Catch (Midstream)  Final Report (24 Apr 2024 07:19):    <10,000 CFU/mL Normal Urogenital Viktoriya    Culture - Blood (collected 22 Apr 2024 22:30)  Source: .Blood Blood-Peripheral  Preliminary Report (24 Apr 2024 02:02):    No growth at 24 hours    Culture - Blood (collected 22 Apr 2024 22:30)  Source: .Blood Blood-Peripheral  Preliminary Report (24 Apr 2024 02:02):    No growth at 24 hours          RADIOLOGY & ADDITIONAL TESTS:  New Imaging Personally Reviewed Today: Yes  New Electrocardiogram Personally Reviewed Today: Yes  Other Results Reviewed Today: Yes  Prior or Outpatient Records Reviewed Today with Summary: Yes    COORDINATION OF CARE:  Consultant Communication and Details of Discussion (where applicable): Yes

## 2024-04-29 NOTE — PROGRESS NOTE ADULT - REASON FOR ADMISSION
urinary retention, weakness, lethargy

## 2024-04-29 NOTE — DIETITIAN INITIAL EVALUATION ADULT - ADD RECOMMEND
- Continue current diet order, which remains appropriate at this time.   - Recommend to change Ensure HP 8oz plus for Nepro 8oz 2x daily (420kcal/19g prot/serving).  - Please consistently document % PO intake in nursing flowsheets to assess adequacy of nutritional intake/monitor need for further nutritional intervention(s).   - Monitor weights, diet tolerance, skin integrity, BMs, pertinent labs.   - RDN services remain available as needed.   - Nutrition department to honor food preferences as feasible.  - Continue current diet order, which remains appropriate at this time.   - Recommend to change Ensure HP 8oz plus for Suplena 8oz 2x daily (425kcal/10.6g prot/serving).  - Please consistently document % PO intake in nursing flowsheets to assess adequacy of nutritional intake/monitor need for further nutritional intervention(s).   - Monitor weights, diet tolerance, skin integrity, BMs, pertinent labs.   - RDN services remain available as needed.   - Nutrition department to honor food preferences as feasible.

## 2024-04-29 NOTE — DIETITIAN INITIAL EVALUATION ADULT - OTHER INFO
Pt seen at bedside eating breakfast with total assistance from HHA. As per Nursing flowsheet Pt with fair PO intake 51-75% of meals. HHA denies any nausea/vomiting/diarrhea/constipation or difficulty chewing and swallowing at this time. Confirm by RN. RN reported last bowel movement earlier today (4/29). Bowel regimen in place. As per RN flowsheet no edema noted. As per RN flowsheet CBW 40.8kg (4/28). Tata called and updated. Tata provided meal preferences. Tata was educated on Renal diet and understanding. Notable meds: Amdodipine, famotidine, senna, MiraLAX. Cyanocobalamin for micronutrient support. Notable renal labs for elevated BUN, and Scr. Dx of CKD. RDN services remain available as needed. Nutrition department to honor food preferences as feasible.

## 2024-04-29 NOTE — DIETITIAN INITIAL EVALUATION ADULT - NSPROEDALEARNER_GEN_A_NUR
Pt with Renal problems, Pt's friend provided with  verbal nutrition education renal nutritional recommendations. Topics include: increased protein needs including protein rich foods at every meal/snack, limiting sodium/phosphorus/potassium intake (foods high and low). Pt's friend verbalized understanding of nutrition education. Addressed all concerns as able./friend

## 2024-04-29 NOTE — DIETITIAN INITIAL EVALUATION ADULT - ORAL INTAKE PTA/DIET HISTORY
Pt seen at bedside with home health aid. HHA reported that patient lives at assisted living facility, where she eats a regular diet. As per HHA patient with good appetite with total feeding assistance PTA.  No known food allergies reported by HHA and Tata (family friend)

## 2024-04-29 NOTE — PROGRESS NOTE ADULT - PROBLEM SELECTOR PLAN 1
Varying accounts of lethargy- see H&P. Patient currently awake, responsive and close to baseline per caregiver at bedside. Facility reporting she is lethargic in AM and then improves to baseline throughout the day.   - CT head/chest/abdomen without any acute findings- no infection, obstruction, edema noted,. Old compression deformity T6, C1  - Patient has been afebrile here, CCB and BMP within range for patient   - TSH 1.42, B12 1551  - Cont home Olanzapine, Trazadone and Seroquel  - s/p IVF  - given thiamine  - Passed TOV 4/27, bladder scan prn, primafit   - Dispo: medically ready for DC to [facility] w/ A

## 2024-04-29 NOTE — DIETITIAN INITIAL EVALUATION ADULT - NS FNS DIET ORDER
Diet, Regular:   For patients receiving Renal Replacement - No Protein Restr, No Conc K, No Conc Phos, Low Sodium (RENAL)  Supplement Feeding Modality:  Oral  Ensure Plus High Protein Cans or Servings Per Day:  2       Frequency:  Daily (04-28-24 @ 08:43) [Active]

## 2024-04-29 NOTE — DIETITIAN INITIAL EVALUATION ADULT - PHYSCIAL ASSESSMENT
Patient observed with muscle wasting and fat loss which identifies her at severe risk of malnutrition.

## 2024-04-29 NOTE — PROGRESS NOTE ADULT - PROVIDER SPECIALTY LIST ADULT
Problem: Patient Care Overview  Goal: Plan of Care Review  Outcome: Ongoing (interventions implemented as appropriate)  4584 -- Patient tolerated treatment well.  Vital signs stable.  No apparent distress noted.  Neulasta OBI applied to ABD.  Green light blinking.  Discharged without S/S of adverse effects.  AVS given.  Patient instructed to call provider with any questions or concerns.  RTC tomorrow for IVF/Nausea medications at 1000.          
Hospitalist
Internal Medicine

## 2024-04-29 NOTE — PROGRESS NOTE ADULT - PROBLEM SELECTOR PLAN 4
Hx of dementia, resides in [facility] in dementia unit with 24/7 caregivers.   - Delirium precautions   - Caregivers at bedside, will continue to provide support while hospitalized   - Cont home Trazadone, Olanzapine and Seroquel  - PT recs rehab
Hx of dementia, resides in [facility] in dementia unit with 24/7 caregivers.   - Delirium precautions   - Caregivers at bedside, will continue to provide support while hospitalized   - Cont home Trazadone, Olanzapine and Seroquel  - f/u PT/OT consult
Hx of dementia, resides in [facility] in dementia unit with 24/7 caregivers.   - Delirium precautions   - Caregivers at bedside, will continue to provide support while hospitalized   - Cont home Trazadone, Olanzapine and Seroquel  [ ] PT c/s

## 2024-04-29 NOTE — PROGRESS NOTE ADULT - PROBLEM SELECTOR PLAN 5
Medication rec done with facility- confirmed over the phone. Facility paperwork listing duplicate entries for Trazadone, Olanzapine, Senna and Melatonin. Confirmed that patient is only given these medications once at night.   Diet: Regular per facility   DVT ppx: Heparin   Code Status: DNR/DNI    Updated  Jacob [phone]  Updated family friend Padmini per  Jacob's request. Jacob would like Padmini to be updated as well during hospitalization as he is hard of hearing and has difficulty remembering details.
Medication rec done with facility  Diet: Regular per facility   DVT ppx: Heparin   Code Status: DNR/DNI  Dispo: HOMERO GALARZA w/ PT, awaiting response from facility     Updated  Jacob [phone] 4/29   Updated family friend Padmini and pt's  Jacob on 4/29
Medication rec done with facility  Diet: Regular per facility   DVT ppx: Heparin   Code Status: DNR/DNI  Dispo: DC to rehab next week, f/u SW    Updated  Jacob [phone]  Updated family friend Padmini and pt's  Jacob on 4/26
Hgb 10.5  f/u iron, TIBC, ferritin  Monitor CBC
Medication rec done with facility- confirmed over the phone. Facility paperwork listing duplicate entries for Trazadone, Olanzapine, Senna and Melatonin. Confirmed that patient is only given these medications once at night.   Diet: Regular per facility   DVT ppx: Heparin   Code Status: DNR/DNI    Updated  Jacob [phone]  Updated family friend Padmini and pt's  Jacob on 4/26
Medication rec done with facility- confirmed over the phone. Facility paperwork listing duplicate entries for Trazadone, Olanzapine, Senna and Melatonin. Confirmed that patient is only given these medications once at night.   Diet: Regular per facility   DVT ppx: Heparin   Code Status: DNR/DNI  Dispo: DC to rehab next week, f/u [facility]    Updated  Jacob [phone]  Updated family friend Padmini and pt's  Jacob on 4/26

## 2024-04-29 NOTE — PROGRESS NOTE ADULT - ASSESSMENT
89 y.o. F w/ a hx of dementia, HLD, CKD, legally blind and hard of hearing presents from [facility], dementia unit for weakness, lethargy and urinary retention, s/p lama, now passed TOV
89 y.o. F w/ a hx of dementia, HLD, CKD, legally blind and hard of hearing presents from [facility], dementia unit for weakness, lethargy and urinary retention, s/p lama, now passed TOV
89 y.o. F w/ a hx of dementia, HLD, CKD, legally blind and hard of hearing [facility], dementia unit for weakness, lethargy and urinary retention. 
89 y.o. F w/ a hx of dementia, HLD, CKD, legally blind and hard of hearing presents from [facility], dementia unit for weakness, lethargy and urinary retention. 
89 y.o. F w/ a hx of dementia, HLD, CKD, legally blind and hard of hearing [facility], dementia unit for weakness, lethargy and urinary retention. 
89 y.o. F w/ a hx of dementia, HLD, CKD, legally blind and hard of hearing presents from group home, dementia unit for weakness, lethargy and urinary retention.

## 2024-04-29 NOTE — DIETITIAN INITIAL EVALUATION ADULT - PERTINENT MEDS FT
MEDICATIONS  (STANDING):  amLODIPine   Tablet 5 milliGRAM(s) Oral daily  chlorhexidine 2% Cloths 1 Application(s) Topical daily  cyanocobalamin 1000 MICROGram(s) Oral daily  famotidine    Tablet 20 milliGRAM(s) Oral daily  heparin   Injectable 5000 Unit(s) SubCutaneous every 12 hours  lactated ringers. 1000 milliLiter(s) (50 mL/Hr) IV Continuous <Continuous>  OLANZapine 5 milliGRAM(s) Oral <User Schedule>  polyethylene glycol 3350 17 Gram(s) Oral daily  QUEtiapine 25 milliGRAM(s) Oral <User Schedule>  QUEtiapine 50 milliGRAM(s) Oral <User Schedule>  senna 2 Tablet(s) Oral at bedtime  tamsulosin 0.4 milliGRAM(s) Oral at bedtime  traZODone 50 milliGRAM(s) Oral at bedtime    MEDICATIONS  (PRN):  acetaminophen     Tablet .. 650 milliGRAM(s) Oral every 6 hours PRN Temp greater or equal to 38C (100.4F), Mild Pain (1 - 3)  aluminum hydroxide/magnesium hydroxide/simethicone Suspension 30 milliLiter(s) Oral every 4 hours PRN Dyspepsia  melatonin 3 milliGRAM(s) Oral at bedtime PRN Insomnia

## 2024-04-29 NOTE — PROGRESS NOTE ADULT - NUTRITIONAL ASSESSMENT
This patient has been assessed with a concern for Malnutrition and has been determined to have a diagnosis/diagnoses of Severe protein-calorie malnutrition.    This patient is being managed with:   Diet Regular-  For patients receiving Renal Replacement - No Protein Restr No Conc K No Conc Phos Low Sodium (RENAL)  Supplement Feeding Modality:  Oral  Nepro Cans or Servings Per Day:  1       Frequency:  Two Times a day  Entered: Apr 29 2024  2:24PM

## 2024-04-29 NOTE — DIETITIAN INITIAL EVALUATION ADULT - NSICDXPASTMEDICALHX_GEN_ALL_CORE_FT
PAST MEDICAL HISTORY:  Chronic kidney disease (CKD)     [place] (hard of hearing)     Hypercholesterolemia     Legally blind     Nystagmus ou

## 2024-04-29 NOTE — DIETITIAN INITIAL EVALUATION ADULT - REASON FOR ADMISSION
Per chart review, Pt  is 89 years old female with PMH: Dementia, HLD, CKD, legally blind and hard of hearing presents from [facility], dementia unit for weakness, lethargy and urinary retention, s/p lama, now passed TOV O. Admit diagnosis other fatigue.

## 2024-04-29 NOTE — DIETITIAN INITIAL EVALUATION ADULT - PERTINENT LABORATORY DATA
04-29    137  |  104  |  43<H>  ----------------------------<  99  4.9   |  21<L>  |  1.41<H>    Ca    8.7      29 Apr 2024 06:05  Phos  3.5     04-29  Mg     1.90     04-29

## 2024-04-29 NOTE — DIETITIAN INITIAL EVALUATION ADULT - PROBLEM SELECTOR PLAN 5
Medication rec done with facility- confirmed over the phone. Facility paperwork listing duplicate entries for Trazadone, Olanzapine, Senna and Melatonin. Confirmed that patient is only given these medications once at night.   Diet: Regular per facility   DVT ppx: Heparin   Code Status: DNR/DNI    Updated  Jacob [phone]  Updated family friend Padmini per  Jacob's request. Jacob would like Padmini to be updated as well during hospitalization as he is hard of hearing and has difficulty remembering details.

## 2024-04-29 NOTE — PROGRESS NOTE ADULT - PROBLEM SELECTOR PROBLEM 5
Medication management
Anemia

## 2024-04-30 ENCOUNTER — TRANSCRIPTION ENCOUNTER (OUTPATIENT)
Age: 89
End: 2024-04-30

## 2024-04-30 VITALS
SYSTOLIC BLOOD PRESSURE: 136 MMHG | HEART RATE: 77 BPM | RESPIRATION RATE: 17 BRPM | TEMPERATURE: 99 F | OXYGEN SATURATION: 99 % | DIASTOLIC BLOOD PRESSURE: 70 MMHG

## 2024-04-30 LAB
MRSA PCR RESULT.: SIGNIFICANT CHANGE UP
S AUREUS DNA NOSE QL NAA+PROBE: SIGNIFICANT CHANGE UP

## 2024-04-30 PROCEDURE — 99232 SBSQ HOSP IP/OBS MODERATE 35: CPT

## 2024-04-30 RX ORDER — AMLODIPINE BESYLATE 2.5 MG/1
1 TABLET ORAL
Qty: 0 | Refills: 0 | DISCHARGE
Start: 2024-04-30

## 2024-04-30 RX ORDER — FAMOTIDINE 10 MG/ML
1 INJECTION INTRAVENOUS
Refills: 0 | DISCHARGE

## 2024-04-30 RX ORDER — TAMSULOSIN HYDROCHLORIDE 0.4 MG/1
1 CAPSULE ORAL
Qty: 0 | Refills: 0 | DISCHARGE
Start: 2024-04-30

## 2024-04-30 RX ORDER — FAMOTIDINE 10 MG/ML
1 INJECTION INTRAVENOUS
Qty: 0 | Refills: 0 | DISCHARGE
Start: 2024-04-30

## 2024-04-30 RX ORDER — POLYETHYLENE GLYCOL 3350 17 G/17G
17 POWDER, FOR SOLUTION ORAL
Refills: 0 | DISCHARGE

## 2024-04-30 RX ADMIN — PREGABALIN 1000 MICROGRAM(S): 225 CAPSULE ORAL at 14:00

## 2024-04-30 RX ADMIN — HEPARIN SODIUM 5000 UNIT(S): 5000 INJECTION INTRAVENOUS; SUBCUTANEOUS at 06:14

## 2024-04-30 RX ADMIN — FAMOTIDINE 20 MILLIGRAM(S): 10 INJECTION INTRAVENOUS at 14:00

## 2024-04-30 RX ADMIN — CHLORHEXIDINE GLUCONATE 1 APPLICATION(S): 213 SOLUTION TOPICAL at 14:03

## 2024-04-30 RX ADMIN — AMLODIPINE BESYLATE 5 MILLIGRAM(S): 2.5 TABLET ORAL at 06:14

## 2024-04-30 RX ADMIN — QUETIAPINE FUMARATE 25 MILLIGRAM(S): 200 TABLET, FILM COATED ORAL at 06:14

## 2024-04-30 NOTE — DISCHARGE NOTE NURSING/CASE MANAGEMENT/SOCIAL WORK - PATIENT PORTAL LINK FT
You can access the FollowMyHealth Patient Portal offered by [facility] by registering at the following website: [email or web address]. By joining [employer]’s FollowMyHealth portal, you will also be able to view your health information using other applications (apps) compatible with our system.

## 2024-04-30 NOTE — DISCHARGE NOTE NURSING/CASE MANAGEMENT/SOCIAL WORK - NSDCPEFALRISK_GEN_ALL_CORE
For information on Fall & Injury Prevention, visit: [email or web address] OR  [email or web address] OR  https://www.cdc.gov/steadi/patient.html

## 2024-05-27 NOTE — ED ADULT NURSE REASSESSMENT NOTE - NS ED NURSE REASSESS COMMENT FT1
Report received from RNMichelle. A&Ox0 noted. NSR on bedside cardiac monitor. Constant observation noted at change of shift with PCA at bedside, as per RN Michelle, patient has no belongings. Cervical Collar applied by MD Flores due to neck fracture. No acute distress noted. Respirations even and unlabored with equal chest rise. Bed in lowest position, appropriate side rails up, wheels locked, fall precautions in effect, safety maintained. Awaiting further orders. Report received from RNMichelle. A&Ox0 noted. NSR on bedside cardiac monitor. Constant observation noted at change of shift with PCA at bedside, as per ROSALBA Martinez, patient has no belongings. MD Fernandezl at bedside applying cervical collar due to neck fracture. Pt appears calm at this time. Constant observation to continue as ordered as per MD. No acute distress noted. Respirations even and unlabored with equal chest rise. Bed in lowest position, appropriate side rails up, wheels locked, fall precautions in effect, safety maintained. Awaiting further orders. Report received from RNMichelle. A&Ox0 noted. NSR on bedside cardiac monitor. Constant observation noted at change of shift with PCA at bedside, as per ROSALBA Martinez, patient has no belongings. MD Flores at bedside applying cervical collar due to neck fracture. Neuro intact. +pulses/movement in all 4 extremities. Pt appears calm at this time. Constant observation to continue as ordered as per MD. No acute distress noted. Respirations even and unlabored with equal chest rise. Bed in lowest position, appropriate side rails up, wheels locked, fall precautions in effect, safety maintained. Awaiting further orders. Xray Chest 2 Views PA/Lat

## 2024-07-01 NOTE — ED ADULT TRIAGE NOTE - PAIN RATING/NUMBER SCALE (0-10): ACTIVITY
Procedure: Lesion Excision(s)    Procedures    Risks and benefits discussed?: YES  All questions answered?: YES  Consents given by: The Patient  Written consent obtained?: YES    ANESTHESIA:  Local.    Indications:   Velvet Rodgers is a 54 y.o. female, , who presents today with lesions located on ***. They have been present for {1-10:68585} {time; unit:42545}. The patient understands all risks, benefits, indications, potential complications, and alternatives, and freely consents for the procedure.  The patient also understands the option of not performing the surgery, the risk for scarring, and the technique of the procedure.    Skin: *** warts noted on ***. Size range is *** cm.  Physical Exam    Procedure documentation:  After informed consent was obtained, and after the skin was prepped and draped, 1% lidocaine {With/without:5700} epinephrine for anesthetic was injected around and underneath the site.   Procedure of {excision:44039} was performed.  A dressing was applied and wound care instructions were provided.  Velvet tolerated the procedure well and without complications.  The patient will be alert for any signs of cutaneous infection and will follow up as instructed.  She tolerated the procedure well.  There were no complications.    Discussed follow up care and planned to return to clinic as directed.    Tanna Kilpatrick, BAILEY  2024     0 (no pain/absence of nonverbal indicators of pain)

## 2024-09-07 NOTE — ED ADULT NURSE NOTE - NS ED NURSE RECORD ANOTHER HT AND WT
RLE: NV intact, 2+ pulses, 5cm wound noted to medial aspect of lower leg, clear serous drainage, no redness, no temp change,
Yes

## 2024-11-06 NOTE — PROGRESS NOTE ADULT - PROBLEM SELECTOR PLAN 2
Facility and caregiver noting that patient has not urinated on day of presentation. Patient was straight catheterized overnight but does not mention if patient was retaining. Per RN, very little UOP today. If patient retaining, maybe 2/2 decreased mobility in setting of knee pain.   - Huitron removed   - UA negative, UCx normal rodney  - c.w flomax 0.4mg hs  - bowel regimen, having BM  - Strict I/O's.   - Renal US no hydronephrosis, normal bladder on CT leg/Left:

## 2024-12-16 NOTE — ASU PREOPERATIVE ASSESSMENT, ADULT (IPARK ONLY) - TEACHING/LEARNING OTHER LEARNERS
friend/spouse Initiate Treatment: cephalexin 500 mg tablet BID\\nQuantity: 14.0 Tablet  Days Supply: 7\\nSig: Take one pill two times a day for 7 days. Render In Strict Bullet Format?: No Detail Level: Zone

## 2024-12-23 ENCOUNTER — EMERGENCY (EMERGENCY)
Facility: HOSPITAL | Age: 88
LOS: 1 days | Discharge: HOME | End: 2024-12-23
Attending: STUDENT IN AN ORGANIZED HEALTH CARE EDUCATION/TRAINING PROGRAM
Payer: MEDICARE

## 2024-12-23 VITALS
RESPIRATION RATE: 18 BRPM | TEMPERATURE: 99 F | SYSTOLIC BLOOD PRESSURE: 152 MMHG | OXYGEN SATURATION: 96 % | HEART RATE: 80 BPM | DIASTOLIC BLOOD PRESSURE: 82 MMHG

## 2024-12-23 VITALS
DIASTOLIC BLOOD PRESSURE: 74 MMHG | TEMPERATURE: 98 F | RESPIRATION RATE: 16 BRPM | WEIGHT: 110.01 LBS | SYSTOLIC BLOOD PRESSURE: 140 MMHG | HEIGHT: 62 IN | HEART RATE: 82 BPM | OXYGEN SATURATION: 96 %

## 2024-12-23 DIAGNOSIS — Z41.1 ENCOUNTER FOR COSMETIC SURGERY: Chronic | ICD-10-CM

## 2024-12-23 DIAGNOSIS — H26.9 UNSPECIFIED CATARACT: Chronic | ICD-10-CM

## 2024-12-23 PROBLEM — N18.9 CHRONIC KIDNEY DISEASE, UNSPECIFIED: Chronic | Status: ACTIVE | Noted: 2024-04-23

## 2024-12-23 PROCEDURE — 99284 EMERGENCY DEPT VISIT MOD MDM: CPT | Mod: 25

## 2024-12-23 PROCEDURE — 70450 CT HEAD/BRAIN W/O DYE: CPT | Mod: MC

## 2024-12-23 PROCEDURE — 99284 EMERGENCY DEPT VISIT MOD MDM: CPT | Mod: FS

## 2024-12-23 PROCEDURE — 70450 CT HEAD/BRAIN W/O DYE: CPT | Mod: 26,MC

## 2024-12-23 NOTE — ED PROVIDER NOTE - CLINICAL SUMMARY MEDICAL DECISION MAKING FREE TEXT BOX
Elderly woman p/w a forehead abrasion after slipping out of her wheelchair. No other injuries noted on exam. Abrasion looks to be a friction burn from rubbing her head on the rug. Will obtain CT head and if normal; will d/c back to facility.

## 2024-12-23 NOTE — ED ADULT NURSE NOTE - NSFALLHARMRISKINTERV_ED_ALL_ED
Assistance OOB with selected safe patient handling equipment if applicable/Assistance with ambulation/Communicate risk of Fall with Harm to all staff, patient, and family/Monitor gait and stability/Monitor for mental status changes and reorient to person, place, and time, as needed/Move patient closer to nursing station/within visual sight of ED staff/Provide patient with walking aids/Provide visual cue: red socks, yellow wristband, yellow gown, etc/Reinforce activity limits and safety measures with patient and family/Toileting schedule using arm’s reach rule for commode and bathroom/Use of alarms - bed, stretcher, chair and/or video monitoring/Bed in lowest position, wheels locked, appropriate side rails in place/Call bell, personal items and telephone in reach/Instruct patient to call for assistance before getting out of bed/chair/stretcher/Non-slip footwear applied when patient is off stretcher/[place] to call system/Physically safe environment - no spills, clutter or unnecessary equipment/Purposeful Proactive Rounding/Room/bathroom lighting operational, light cord in reach

## 2024-12-23 NOTE — ED PROVIDER NOTE - ENMT NEGATIVE STATEMENT, MLM
Caller: TERRY    Relationship: NEPHROLOGIST     Best call back number: FAX: [phone]    What form or medical record are you requesting: LABS FROM 7/1/23 -7/31/23    Who is requesting this form or medical record from you: DR LYNNE    How would you like to receive the form or medical records (pick-up, mail, fax): FAX  If fax, what is the fax number: [phone]    Timeframe paperwork needed: TODAY PLEASE             Ears: no ear pain and no hearing problems. Nose: no nasal congestion and no nasal drainage. Mouth/Throat: no dysphagia, no hoarseness and no throat pain. Neck: no lumps, no pain, no stiffness and no swollen glands.

## 2024-12-23 NOTE — ED ADULT NURSE NOTE - OBJECTIVE STATEMENT
89 y/o F, pmh dementia, presents to the ED s/p witnessed fall today at nursing home. EMS reports +head strike, -blood thinners, -LOC. family at the bed side for history, pt is a poor historian. pt breathing is spontaneous, unlabored, recognizes family at bed side, A&Ox0, this is pt baseline mental status. pt uses  wheel chair at facility. no acute distress noted at this time. pt placed in gown, moving extremities x 4, responds to verbal, tactile, painful stimuli.

## 2024-12-23 NOTE — ED PROVIDER NOTE - NSFOLLOWUPINSTRUCTIONS_ED_ALL_ED_FT
take all home medications as prescribed    take tylenol as needed for pain    follow up with your primary care physician in the next week    return to the ED for any worsening pain, change in mental status and all other concerns.

## 2024-12-23 NOTE — ED PROVIDER NOTE - PSYCHIATRIC NEGATIVE STATEMENT, MLM
05/07/24 8:14 AM     VB [employer] SmartForm used to document caregap status.    Aye Rodriguez   
no known mental health issues.

## 2024-12-23 NOTE — ED PROVIDER NOTE - OBJECTIVE STATEMENT
89-year-old female with history of dementia, here for evaluation s/p fall out of the wheelchair today.  Patient accompanied by family who states they are visiting her at the nursing home when she slid out of the wheelchair and hit her head.  No LOC, patient nonambulatory at baseline did not attempt to ambulate afterwards.  Patient not on AC, per family members patient is at her baseline mentally.  Denies any cough, fevers, shortness of breath, abdominal pain.

## 2024-12-23 NOTE — ED PROVIDER NOTE - PATIENT PORTAL LINK FT
You can access the FollowMyHealth Patient Portal offered by [facility] by registering at the following website: [email or web address]. By joining [employer]’s FollowMyHealth portal, you will also be able to view your health information using other applications (apps) compatible with our system.

## 2024-12-23 NOTE — ED PROVIDER NOTE - PHYSICAL EXAMINATION
A&Ox1, NAD, well appearing  + superficial nonbleeding abrasion to the L forehead. no fb. EOMI.  No vertebral ttp of the c/t/l spine  Lungs CTAB. No w/r/r  Cardiac  RRR, no chest wall ttp or overlaying ecchymosis/abrasions/lacerations  Abd soft, NT/ND, no rebound or guarding.   Extremities: cap refill <2, pulses in distal extremities 4+, no edema.   Skin without ecchymosis, abrasions, lacerations  No focal Deficits, moving all extremities equally.

## 2025-01-01 ENCOUNTER — INPATIENT (INPATIENT)
Facility: HOSPITAL | Age: 89
LOS: 16 days | DRG: 948 | End: 2025-04-04
Attending: INTERNAL MEDICINE | Admitting: INTERNAL MEDICINE
Payer: MEDICARE

## 2025-01-01 VITALS
DIASTOLIC BLOOD PRESSURE: 54 MMHG | RESPIRATION RATE: 20 BRPM | SYSTOLIC BLOOD PRESSURE: 69 MMHG | TEMPERATURE: 98 F | HEART RATE: 131 BPM

## 2025-01-01 VITALS
OXYGEN SATURATION: 100 % | SYSTOLIC BLOOD PRESSURE: 159 MMHG | DIASTOLIC BLOOD PRESSURE: 84 MMHG | RESPIRATION RATE: 37 BRPM | WEIGHT: 89.95 LBS | HEART RATE: 94 BPM | HEIGHT: 61 IN

## 2025-01-01 DIAGNOSIS — R93.89 ABNORMAL FINDINGS ON DIAGNOSTIC IMAGING OF OTHER SPECIFIED BODY STRUCTURES: ICD-10-CM

## 2025-01-01 DIAGNOSIS — R09.02 HYPOXEMIA: ICD-10-CM

## 2025-01-01 DIAGNOSIS — R06.00 DYSPNEA, UNSPECIFIED: ICD-10-CM

## 2025-01-01 DIAGNOSIS — R09.89 OTHER SPECIFIED SYMPTOMS AND SIGNS INVOLVING THE CIRCULATORY AND RESPIRATORY SYSTEMS: ICD-10-CM

## 2025-01-01 DIAGNOSIS — R45.1 RESTLESSNESS AND AGITATION: ICD-10-CM

## 2025-01-01 DIAGNOSIS — N39.0 URINARY TRACT INFECTION, SITE NOT SPECIFIED: ICD-10-CM

## 2025-01-01 DIAGNOSIS — I10 ESSENTIAL (PRIMARY) HYPERTENSION: ICD-10-CM

## 2025-01-01 DIAGNOSIS — N18.30 CHRONIC KIDNEY DISEASE, STAGE 3 UNSPECIFIED: ICD-10-CM

## 2025-01-01 DIAGNOSIS — R53.2 FUNCTIONAL QUADRIPLEGIA: ICD-10-CM

## 2025-01-01 DIAGNOSIS — Z51.5 ENCOUNTER FOR PALLIATIVE CARE: ICD-10-CM

## 2025-01-01 DIAGNOSIS — I95.9 HYPOTENSION, UNSPECIFIED: ICD-10-CM

## 2025-01-01 DIAGNOSIS — R52 PAIN, UNSPECIFIED: ICD-10-CM

## 2025-01-01 DIAGNOSIS — Z79.899 OTHER LONG TERM (CURRENT) DRUG THERAPY: ICD-10-CM

## 2025-01-01 DIAGNOSIS — R41.82 ALTERED MENTAL STATUS, UNSPECIFIED: ICD-10-CM

## 2025-01-01 DIAGNOSIS — E87.0 HYPEROSMOLALITY AND HYPERNATREMIA: ICD-10-CM

## 2025-01-01 DIAGNOSIS — R91.1 SOLITARY PULMONARY NODULE: ICD-10-CM

## 2025-01-01 DIAGNOSIS — Z71.89 OTHER SPECIFIED COUNSELING: ICD-10-CM

## 2025-01-01 DIAGNOSIS — J96.01 ACUTE RESPIRATORY FAILURE WITH HYPOXIA: ICD-10-CM

## 2025-01-01 DIAGNOSIS — J18.9 PNEUMONIA, UNSPECIFIED ORGANISM: ICD-10-CM

## 2025-01-01 DIAGNOSIS — Z29.9 ENCOUNTER FOR PROPHYLACTIC MEASURES, UNSPECIFIED: ICD-10-CM

## 2025-01-01 DIAGNOSIS — G93.41 METABOLIC ENCEPHALOPATHY: ICD-10-CM

## 2025-01-01 LAB
-  AMOXICILLIN/CLAVULANIC ACID: SIGNIFICANT CHANGE UP
-  AMPICILLIN/SULBACTAM: SIGNIFICANT CHANGE UP
-  AMPICILLIN: SIGNIFICANT CHANGE UP
-  AZTREONAM: SIGNIFICANT CHANGE UP
-  CEFAZOLIN: SIGNIFICANT CHANGE UP
-  CEFEPIME: SIGNIFICANT CHANGE UP
-  CEFOXITIN: SIGNIFICANT CHANGE UP
-  CEFTRIAXONE: SIGNIFICANT CHANGE UP
-  CEFUROXIME: SIGNIFICANT CHANGE UP
-  CIPROFLOXACIN: SIGNIFICANT CHANGE UP
-  ERTAPENEM: SIGNIFICANT CHANGE UP
-  GENTAMICIN: SIGNIFICANT CHANGE UP
-  IMIPENEM: SIGNIFICANT CHANGE UP
-  LEVOFLOXACIN: SIGNIFICANT CHANGE UP
-  MEROPENEM: SIGNIFICANT CHANGE UP
-  NITROFURANTOIN: SIGNIFICANT CHANGE UP
-  PIPERACILLIN/TAZOBACTAM: SIGNIFICANT CHANGE UP
-  STAPHYLOCOCCUS EPIDERMIDIS, METHICILLIN RESISTANT: SIGNIFICANT CHANGE UP
-  TOBRAMYCIN: SIGNIFICANT CHANGE UP
-  TRIMETHOPRIM/SULFAMETHOXAZOLE: SIGNIFICANT CHANGE UP
ALBUMIN SERPL ELPH-MCNC: 2.6 G/DL — LOW (ref 3.3–5)
ALBUMIN SERPL ELPH-MCNC: 2.9 G/DL — LOW (ref 3.3–5)
ALBUMIN SERPL ELPH-MCNC: 3.5 G/DL — SIGNIFICANT CHANGE UP (ref 3.3–5)
ALP SERPL-CCNC: 108 U/L — SIGNIFICANT CHANGE UP (ref 40–120)
ALP SERPL-CCNC: 76 U/L — SIGNIFICANT CHANGE UP (ref 40–120)
ALP SERPL-CCNC: 83 U/L — SIGNIFICANT CHANGE UP (ref 40–120)
ALT FLD-CCNC: 17 U/L — SIGNIFICANT CHANGE UP (ref 10–45)
ALT FLD-CCNC: 20 U/L — SIGNIFICANT CHANGE UP (ref 10–45)
ALT FLD-CCNC: 21 U/L — SIGNIFICANT CHANGE UP (ref 10–45)
ANION GAP SERPL CALC-SCNC: 11 MMOL/L — SIGNIFICANT CHANGE UP (ref 5–17)
ANION GAP SERPL CALC-SCNC: 11 MMOL/L — SIGNIFICANT CHANGE UP (ref 5–17)
ANION GAP SERPL CALC-SCNC: 12 MMOL/L — SIGNIFICANT CHANGE UP (ref 5–17)
ANION GAP SERPL CALC-SCNC: 17 MMOL/L — SIGNIFICANT CHANGE UP (ref 5–17)
ANION GAP SERPL CALC-SCNC: 17 MMOL/L — SIGNIFICANT CHANGE UP (ref 5–17)
ANION GAP SERPL CALC-SCNC: 18 MMOL/L — HIGH (ref 5–17)
APPEARANCE UR: ABNORMAL
AST SERPL-CCNC: 13 U/L — SIGNIFICANT CHANGE UP (ref 10–40)
AST SERPL-CCNC: 14 U/L — SIGNIFICANT CHANGE UP (ref 10–40)
AST SERPL-CCNC: 17 U/L — SIGNIFICANT CHANGE UP (ref 10–40)
BACTERIA # UR AUTO: ABNORMAL /HPF
BASOPHILS # BLD AUTO: 0 K/UL — SIGNIFICANT CHANGE UP (ref 0–0.2)
BASOPHILS # BLD AUTO: 0.03 K/UL — SIGNIFICANT CHANGE UP (ref 0–0.2)
BASOPHILS # BLD AUTO: 0.04 K/UL — SIGNIFICANT CHANGE UP (ref 0–0.2)
BASOPHILS NFR BLD AUTO: 0 % — SIGNIFICANT CHANGE UP (ref 0–2)
BASOPHILS NFR BLD AUTO: 0.2 % — SIGNIFICANT CHANGE UP (ref 0–2)
BASOPHILS NFR BLD AUTO: 0.4 % — SIGNIFICANT CHANGE UP (ref 0–2)
BILIRUB SERPL-MCNC: 0.6 MG/DL — SIGNIFICANT CHANGE UP (ref 0.2–1.2)
BILIRUB SERPL-MCNC: 0.6 MG/DL — SIGNIFICANT CHANGE UP (ref 0.2–1.2)
BILIRUB SERPL-MCNC: 0.7 MG/DL — SIGNIFICANT CHANGE UP (ref 0.2–1.2)
BILIRUB UR-MCNC: NEGATIVE — SIGNIFICANT CHANGE UP
BUN SERPL-MCNC: 14 MG/DL — SIGNIFICANT CHANGE UP (ref 7–23)
BUN SERPL-MCNC: 16 MG/DL — SIGNIFICANT CHANGE UP (ref 7–23)
BUN SERPL-MCNC: 19 MG/DL — SIGNIFICANT CHANGE UP (ref 7–23)
BUN SERPL-MCNC: 30 MG/DL — HIGH (ref 7–23)
BUN SERPL-MCNC: 37 MG/DL — HIGH (ref 7–23)
BUN SERPL-MCNC: 37 MG/DL — HIGH (ref 7–23)
CALCIUM SERPL-MCNC: 10 MG/DL — SIGNIFICANT CHANGE UP (ref 8.4–10.5)
CALCIUM SERPL-MCNC: 10.5 MG/DL — SIGNIFICANT CHANGE UP (ref 8.4–10.5)
CALCIUM SERPL-MCNC: 10.6 MG/DL — HIGH (ref 8.4–10.5)
CALCIUM SERPL-MCNC: 9.1 MG/DL — SIGNIFICANT CHANGE UP (ref 8.4–10.5)
CALCIUM SERPL-MCNC: 9.4 MG/DL — SIGNIFICANT CHANGE UP (ref 8.4–10.5)
CALCIUM SERPL-MCNC: 9.5 MG/DL — SIGNIFICANT CHANGE UP (ref 8.4–10.5)
CALCIUM SERPL-MCNC: 9.5 MG/DL — SIGNIFICANT CHANGE UP (ref 8.4–10.5)
CAST: 17 /LPF — HIGH (ref 0–4)
CHLORIDE SERPL-SCNC: 104 MMOL/L — SIGNIFICANT CHANGE UP (ref 96–108)
CHLORIDE SERPL-SCNC: 105 MMOL/L — SIGNIFICANT CHANGE UP (ref 96–108)
CHLORIDE SERPL-SCNC: 110 MMOL/L — HIGH (ref 96–108)
CHLORIDE SERPL-SCNC: 112 MMOL/L — HIGH (ref 96–108)
CHLORIDE SERPL-SCNC: 113 MMOL/L — HIGH (ref 96–108)
CHLORIDE SERPL-SCNC: 121 MMOL/L — HIGH (ref 96–108)
CO2 SERPL-SCNC: 20 MMOL/L — LOW (ref 22–31)
CO2 SERPL-SCNC: 21 MMOL/L — LOW (ref 22–31)
CO2 SERPL-SCNC: 21 MMOL/L — LOW (ref 22–31)
CO2 SERPL-SCNC: 22 MMOL/L — SIGNIFICANT CHANGE UP (ref 22–31)
CO2 SERPL-SCNC: 23 MMOL/L — SIGNIFICANT CHANGE UP (ref 22–31)
CO2 SERPL-SCNC: 24 MMOL/L — SIGNIFICANT CHANGE UP (ref 22–31)
COLOR SPEC: YELLOW — SIGNIFICANT CHANGE UP
CREAT SERPL-MCNC: 1.12 MG/DL — SIGNIFICANT CHANGE UP (ref 0.5–1.3)
CREAT SERPL-MCNC: 1.2 MG/DL — SIGNIFICANT CHANGE UP (ref 0.5–1.3)
CREAT SERPL-MCNC: 1.27 MG/DL — SIGNIFICANT CHANGE UP (ref 0.5–1.3)
CREAT SERPL-MCNC: 1.27 MG/DL — SIGNIFICANT CHANGE UP (ref 0.5–1.3)
CREAT SERPL-MCNC: 1.31 MG/DL — HIGH (ref 0.5–1.3)
CREAT SERPL-MCNC: 1.45 MG/DL — HIGH (ref 0.5–1.3)
CULTURE RESULTS: ABNORMAL
CULTURE RESULTS: ABNORMAL
CULTURE RESULTS: SIGNIFICANT CHANGE UP
D DIMER BLD IA.RAPID-MCNC: 964 NG/ML DDU — HIGH
DIFF PNL FLD: ABNORMAL
EGFR: 34 ML/MIN/1.73M2 — LOW
EGFR: 34 ML/MIN/1.73M2 — LOW
EGFR: 38 ML/MIN/1.73M2 — LOW
EGFR: 38 ML/MIN/1.73M2 — LOW
EGFR: 39 ML/MIN/1.73M2 — LOW
EGFR: 42 ML/MIN/1.73M2 — LOW
EGFR: 42 ML/MIN/1.73M2 — LOW
EGFR: 46 ML/MIN/1.73M2 — LOW
EGFR: 46 ML/MIN/1.73M2 — LOW
EOSINOPHIL # BLD AUTO: 0 K/UL — SIGNIFICANT CHANGE UP (ref 0–0.5)
EOSINOPHIL # BLD AUTO: 0.15 K/UL — SIGNIFICANT CHANGE UP (ref 0–0.5)
EOSINOPHIL # BLD AUTO: 0.79 K/UL — HIGH (ref 0–0.5)
EOSINOPHIL NFR BLD AUTO: 0 % — SIGNIFICANT CHANGE UP (ref 0–6)
EOSINOPHIL NFR BLD AUTO: 1.1 % — SIGNIFICANT CHANGE UP (ref 0–6)
EOSINOPHIL NFR BLD AUTO: 7.2 % — HIGH (ref 0–6)
FLUAV AG NPH QL: SIGNIFICANT CHANGE UP
FLUBV AG NPH QL: SIGNIFICANT CHANGE UP
GAS PNL BLDV: SIGNIFICANT CHANGE UP
GAS PNL BLDV: SIGNIFICANT CHANGE UP
GLUCOSE BLDC GLUCOMTR-MCNC: 66 MG/DL — LOW (ref 70–99)
GLUCOSE BLDC GLUCOMTR-MCNC: 70 MG/DL — SIGNIFICANT CHANGE UP (ref 70–99)
GLUCOSE SERPL-MCNC: 124 MG/DL — HIGH (ref 70–99)
GLUCOSE SERPL-MCNC: 129 MG/DL — HIGH (ref 70–99)
GLUCOSE SERPL-MCNC: 138 MG/DL — HIGH (ref 70–99)
GLUCOSE SERPL-MCNC: 152 MG/DL — HIGH (ref 70–99)
GLUCOSE SERPL-MCNC: 87 MG/DL — SIGNIFICANT CHANGE UP (ref 70–99)
GLUCOSE SERPL-MCNC: 94 MG/DL — SIGNIFICANT CHANGE UP (ref 70–99)
GLUCOSE UR QL: NEGATIVE MG/DL — SIGNIFICANT CHANGE UP
GRAM STN FLD: ABNORMAL
HCT VFR BLD CALC: 32.3 % — LOW (ref 34.5–45)
HCT VFR BLD CALC: 32.7 % — LOW (ref 34.5–45)
HCT VFR BLD CALC: 35.2 % — SIGNIFICANT CHANGE UP (ref 34.5–45)
HCT VFR BLD CALC: 38.8 % — SIGNIFICANT CHANGE UP (ref 34.5–45)
HCT VFR BLD CALC: 39.4 % — SIGNIFICANT CHANGE UP (ref 34.5–45)
HGB BLD-MCNC: 10.9 G/DL — LOW (ref 11.5–15.5)
HGB BLD-MCNC: 11.7 G/DL — SIGNIFICANT CHANGE UP (ref 11.5–15.5)
HGB BLD-MCNC: 12.1 G/DL — SIGNIFICANT CHANGE UP (ref 11.5–15.5)
HGB BLD-MCNC: 9.7 G/DL — LOW (ref 11.5–15.5)
HGB BLD-MCNC: 9.9 G/DL — LOW (ref 11.5–15.5)
IMM GRANULOCYTES NFR BLD AUTO: 0.3 % — SIGNIFICANT CHANGE UP (ref 0–0.9)
IMM GRANULOCYTES NFR BLD AUTO: 0.4 % — SIGNIFICANT CHANGE UP (ref 0–0.9)
KETONES UR-MCNC: NEGATIVE MG/DL — SIGNIFICANT CHANGE UP
LACTATE SERPL-SCNC: 1.7 MMOL/L — SIGNIFICANT CHANGE UP (ref 0.5–2)
LEGIONELLA AG UR QL: NEGATIVE — SIGNIFICANT CHANGE UP
LEUKOCYTE ESTERASE UR-ACNC: ABNORMAL
LYMPHOCYTES # BLD AUTO: 0.66 K/UL — LOW (ref 1–3.3)
LYMPHOCYTES # BLD AUTO: 0.8 K/UL — LOW (ref 1–3.3)
LYMPHOCYTES # BLD AUTO: 0.89 K/UL — LOW (ref 1–3.3)
LYMPHOCYTES # BLD AUTO: 4.2 % — LOW (ref 13–44)
LYMPHOCYTES # BLD AUTO: 5.6 % — LOW (ref 13–44)
LYMPHOCYTES # BLD AUTO: 8.2 % — LOW (ref 13–44)
MAGNESIUM SERPL-MCNC: 1.6 MG/DL — SIGNIFICANT CHANGE UP (ref 1.6–2.6)
MAGNESIUM SERPL-MCNC: 1.7 MG/DL — SIGNIFICANT CHANGE UP (ref 1.6–2.6)
MAGNESIUM SERPL-MCNC: 1.8 MG/DL — SIGNIFICANT CHANGE UP (ref 1.6–2.6)
MAGNESIUM SERPL-MCNC: 2.1 MG/DL — SIGNIFICANT CHANGE UP (ref 1.6–2.6)
MAGNESIUM SERPL-MCNC: 2.2 MG/DL — SIGNIFICANT CHANGE UP (ref 1.6–2.6)
MANUAL SMEAR VERIFICATION: SIGNIFICANT CHANGE UP
MCHC RBC-ENTMCNC: 27.6 PG — SIGNIFICANT CHANGE UP (ref 27–34)
MCHC RBC-ENTMCNC: 27.6 PG — SIGNIFICANT CHANGE UP (ref 27–34)
MCHC RBC-ENTMCNC: 27.7 PG — SIGNIFICANT CHANGE UP (ref 27–34)
MCHC RBC-ENTMCNC: 27.7 PG — SIGNIFICANT CHANGE UP (ref 27–34)
MCHC RBC-ENTMCNC: 27.8 PG — SIGNIFICANT CHANGE UP (ref 27–34)
MCHC RBC-ENTMCNC: 30 G/DL — LOW (ref 32–36)
MCHC RBC-ENTMCNC: 30.2 G/DL — LOW (ref 32–36)
MCHC RBC-ENTMCNC: 30.3 G/DL — LOW (ref 32–36)
MCHC RBC-ENTMCNC: 30.7 G/DL — LOW (ref 32–36)
MCHC RBC-ENTMCNC: 31 G/DL — LOW (ref 32–36)
MCV RBC AUTO: 89.6 FL — SIGNIFICANT CHANGE UP (ref 80–100)
MCV RBC AUTO: 90 FL — SIGNIFICANT CHANGE UP (ref 80–100)
MCV RBC AUTO: 91.9 FL — SIGNIFICANT CHANGE UP (ref 80–100)
MCV RBC AUTO: 91.9 FL — SIGNIFICANT CHANGE UP (ref 80–100)
MCV RBC AUTO: 92 FL — SIGNIFICANT CHANGE UP (ref 80–100)
METAMYELOCYTES # FLD: 0.9 % — HIGH (ref 0–0)
METAMYELOCYTES NFR BLD: 0.9 % — HIGH (ref 0–0)
METHOD TYPE: SIGNIFICANT CHANGE UP
METHOD TYPE: SIGNIFICANT CHANGE UP
MONOCYTES # BLD AUTO: 1.3 K/UL — HIGH (ref 0–0.9)
MONOCYTES # BLD AUTO: 1.32 K/UL — HIGH (ref 0–0.9)
MONOCYTES # BLD AUTO: 1.34 K/UL — HIGH (ref 0–0.9)
MONOCYTES NFR BLD AUTO: 12.1 % — SIGNIFICANT CHANGE UP (ref 2–14)
MONOCYTES NFR BLD AUTO: 8.5 % — SIGNIFICANT CHANGE UP (ref 2–14)
MONOCYTES NFR BLD AUTO: 9.1 % — SIGNIFICANT CHANGE UP (ref 2–14)
MRSA PCR RESULT.: SIGNIFICANT CHANGE UP
NEUTROPHILS # BLD AUTO: 11.88 K/UL — HIGH (ref 1.8–7.4)
NEUTROPHILS # BLD AUTO: 13.65 K/UL — HIGH (ref 1.8–7.4)
NEUTROPHILS # BLD AUTO: 7.85 K/UL — HIGH (ref 1.8–7.4)
NEUTROPHILS NFR BLD AUTO: 71.8 % — SIGNIFICANT CHANGE UP (ref 43–77)
NEUTROPHILS NFR BLD AUTO: 83.6 % — HIGH (ref 43–77)
NEUTROPHILS NFR BLD AUTO: 86.4 % — HIGH (ref 43–77)
NITRITE UR-MCNC: POSITIVE
NRBC BLD AUTO-RTO: 0 /100 WBCS — SIGNIFICANT CHANGE UP (ref 0–0)
NT-PROBNP SERPL-SCNC: 2324 PG/ML — HIGH (ref 0–300)
ORGANISM # SPEC MICROSCOPIC CNT: ABNORMAL
PH UR: 5.5 — SIGNIFICANT CHANGE UP (ref 5–8)
PHOSPHATE SERPL-MCNC: 2.2 MG/DL — LOW (ref 2.5–4.5)
PHOSPHATE SERPL-MCNC: 3.3 MG/DL — SIGNIFICANT CHANGE UP (ref 2.5–4.5)
PLAT MORPH BLD: NORMAL — SIGNIFICANT CHANGE UP
PLATELET # BLD AUTO: 295 K/UL — SIGNIFICANT CHANGE UP (ref 150–400)
PLATELET # BLD AUTO: 295 K/UL — SIGNIFICANT CHANGE UP (ref 150–400)
PLATELET # BLD AUTO: 318 K/UL — SIGNIFICANT CHANGE UP (ref 150–400)
PLATELET # BLD AUTO: 362 K/UL — SIGNIFICANT CHANGE UP (ref 150–400)
PLATELET # BLD AUTO: 399 K/UL — SIGNIFICANT CHANGE UP (ref 150–400)
POLYCHROMASIA BLD QL SMEAR: SLIGHT — SIGNIFICANT CHANGE UP
POTASSIUM SERPL-MCNC: 3.4 MMOL/L — LOW (ref 3.5–5.3)
POTASSIUM SERPL-MCNC: 3.5 MMOL/L — SIGNIFICANT CHANGE UP (ref 3.5–5.3)
POTASSIUM SERPL-MCNC: 3.5 MMOL/L — SIGNIFICANT CHANGE UP (ref 3.5–5.3)
POTASSIUM SERPL-MCNC: 3.8 MMOL/L — SIGNIFICANT CHANGE UP (ref 3.5–5.3)
POTASSIUM SERPL-MCNC: 4.2 MMOL/L — SIGNIFICANT CHANGE UP (ref 3.5–5.3)
POTASSIUM SERPL-MCNC: 4.3 MMOL/L — SIGNIFICANT CHANGE UP (ref 3.5–5.3)
POTASSIUM SERPL-SCNC: 3.4 MMOL/L — LOW (ref 3.5–5.3)
POTASSIUM SERPL-SCNC: 3.5 MMOL/L — SIGNIFICANT CHANGE UP (ref 3.5–5.3)
POTASSIUM SERPL-SCNC: 3.5 MMOL/L — SIGNIFICANT CHANGE UP (ref 3.5–5.3)
POTASSIUM SERPL-SCNC: 3.8 MMOL/L — SIGNIFICANT CHANGE UP (ref 3.5–5.3)
POTASSIUM SERPL-SCNC: 4.2 MMOL/L — SIGNIFICANT CHANGE UP (ref 3.5–5.3)
POTASSIUM SERPL-SCNC: 4.3 MMOL/L — SIGNIFICANT CHANGE UP (ref 3.5–5.3)
PROT SERPL-MCNC: 5.5 G/DL — LOW (ref 6–8.3)
PROT SERPL-MCNC: 5.8 G/DL — LOW (ref 6–8.3)
PROT SERPL-MCNC: 7 G/DL — SIGNIFICANT CHANGE UP (ref 6–8.3)
PROT UR-MCNC: 30 MG/DL
PTH-INTACT FLD-MCNC: 35 PG/ML — SIGNIFICANT CHANGE UP (ref 15–65)
RBC # BLD: 3.51 M/UL — LOW (ref 3.8–5.2)
RBC # BLD: 3.56 M/UL — LOW (ref 3.8–5.2)
RBC # BLD: 3.93 M/UL — SIGNIFICANT CHANGE UP (ref 3.8–5.2)
RBC # BLD: 4.22 M/UL — SIGNIFICANT CHANGE UP (ref 3.8–5.2)
RBC # BLD: 4.38 M/UL — SIGNIFICANT CHANGE UP (ref 3.8–5.2)
RBC # FLD: 13.9 % — SIGNIFICANT CHANGE UP (ref 10.3–14.5)
RBC # FLD: 14 % — SIGNIFICANT CHANGE UP (ref 10.3–14.5)
RBC # FLD: 14.6 % — HIGH (ref 10.3–14.5)
RBC BLD AUTO: SIGNIFICANT CHANGE UP
RBC CASTS # UR COMP ASSIST: 3 /HPF — SIGNIFICANT CHANGE UP (ref 0–4)
REVIEW: SIGNIFICANT CHANGE UP
RSV RNA NPH QL NAA+NON-PROBE: SIGNIFICANT CHANGE UP
S AUREUS DNA NOSE QL NAA+PROBE: SIGNIFICANT CHANGE UP
S PNEUM AG UR QL: NEGATIVE — SIGNIFICANT CHANGE UP
SARS-COV-2 RNA SPEC QL NAA+PROBE: SIGNIFICANT CHANGE UP
SODIUM SERPL-SCNC: 139 MMOL/L — SIGNIFICANT CHANGE UP (ref 135–145)
SODIUM SERPL-SCNC: 141 MMOL/L — SIGNIFICANT CHANGE UP (ref 135–145)
SODIUM SERPL-SCNC: 148 MMOL/L — HIGH (ref 135–145)
SODIUM SERPL-SCNC: 148 MMOL/L — HIGH (ref 135–145)
SODIUM SERPL-SCNC: 151 MMOL/L — HIGH (ref 135–145)
SODIUM SERPL-SCNC: 155 MMOL/L — HIGH (ref 135–145)
SP GR SPEC: 1.02 — SIGNIFICANT CHANGE UP (ref 1–1.03)
SPECIMEN SOURCE: SIGNIFICANT CHANGE UP
SQUAMOUS # UR AUTO: 3 /HPF — SIGNIFICANT CHANGE UP (ref 0–5)
TROPONIN T, HIGH SENSITIVITY RESULT: 64 NG/L — HIGH (ref 0–51)
TROPONIN T, HIGH SENSITIVITY RESULT: 65 NG/L — HIGH (ref 0–51)
TSH SERPL-MCNC: 0.26 UIU/ML — LOW (ref 0.27–4.2)
UROBILINOGEN FLD QL: 0.2 MG/DL — SIGNIFICANT CHANGE UP (ref 0.2–1)
VIT B12 SERPL-MCNC: >2000 PG/ML — HIGH (ref 232–1245)
WBC # BLD: 10.77 K/UL — HIGH (ref 3.8–10.5)
WBC # BLD: 10.92 K/UL — HIGH (ref 3.8–10.5)
WBC # BLD: 12.41 K/UL — HIGH (ref 3.8–10.5)
WBC # BLD: 14.22 K/UL — HIGH (ref 3.8–10.5)
WBC # BLD: 15.8 K/UL — HIGH (ref 3.8–10.5)
WBC # FLD AUTO: 10.77 K/UL — HIGH (ref 3.8–10.5)
WBC # FLD AUTO: 10.92 K/UL — HIGH (ref 3.8–10.5)
WBC # FLD AUTO: 12.41 K/UL — HIGH (ref 3.8–10.5)
WBC # FLD AUTO: 14.22 K/UL — HIGH (ref 3.8–10.5)
WBC # FLD AUTO: 15.8 K/UL — HIGH (ref 3.8–10.5)
WBC UR QL: 616 /HPF — HIGH (ref 0–5)

## 2025-01-01 PROCEDURE — 99285 EMERGENCY DEPT VISIT HI MDM: CPT | Mod: 25

## 2025-01-01 PROCEDURE — 99233 SBSQ HOSP IP/OBS HIGH 50: CPT | Mod: FS

## 2025-01-01 PROCEDURE — 82607 VITAMIN B-12: CPT

## 2025-01-01 PROCEDURE — 99497 ADVNCD CARE PLAN 30 MIN: CPT | Mod: 25

## 2025-01-01 PROCEDURE — 83735 ASSAY OF MAGNESIUM: CPT

## 2025-01-01 PROCEDURE — 99232 SBSQ HOSP IP/OBS MODERATE 35: CPT

## 2025-01-01 PROCEDURE — 93010 ELECTROCARDIOGRAM REPORT: CPT

## 2025-01-01 PROCEDURE — 83970 ASSAY OF PARATHORMONE: CPT

## 2025-01-01 PROCEDURE — 87637 SARSCOV2&INF A&B&RSV AMP PRB: CPT

## 2025-01-01 PROCEDURE — 99233 SBSQ HOSP IP/OBS HIGH 50: CPT | Mod: GC

## 2025-01-01 PROCEDURE — 99497 ADVNCD CARE PLAN 30 MIN: CPT

## 2025-01-01 PROCEDURE — 85027 COMPLETE CBC AUTOMATED: CPT

## 2025-01-01 PROCEDURE — 85379 FIBRIN DEGRADATION QUANT: CPT

## 2025-01-01 PROCEDURE — 82330 ASSAY OF CALCIUM: CPT

## 2025-01-01 PROCEDURE — 71250 CT THORAX DX C-: CPT | Mod: 26

## 2025-01-01 PROCEDURE — 99285 EMERGENCY DEPT VISIT HI MDM: CPT | Mod: FS

## 2025-01-01 PROCEDURE — 87449 NOS EACH ORGANISM AG IA: CPT

## 2025-01-01 PROCEDURE — 85025 COMPLETE CBC W/AUTO DIFF WBC: CPT

## 2025-01-01 PROCEDURE — 99233 SBSQ HOSP IP/OBS HIGH 50: CPT

## 2025-01-01 PROCEDURE — 87150 DNA/RNA AMPLIFIED PROBE: CPT

## 2025-01-01 PROCEDURE — 85018 HEMOGLOBIN: CPT

## 2025-01-01 PROCEDURE — 71045 X-RAY EXAM CHEST 1 VIEW: CPT

## 2025-01-01 PROCEDURE — 71045 X-RAY EXAM CHEST 1 VIEW: CPT | Mod: 26

## 2025-01-01 PROCEDURE — 71250 CT THORAX DX C-: CPT | Mod: MC

## 2025-01-01 PROCEDURE — 87040 BLOOD CULTURE FOR BACTERIA: CPT

## 2025-01-01 PROCEDURE — 87086 URINE CULTURE/COLONY COUNT: CPT

## 2025-01-01 PROCEDURE — 99222 1ST HOSP IP/OBS MODERATE 55: CPT

## 2025-01-01 PROCEDURE — 87899 AGENT NOS ASSAY W/OPTIC: CPT

## 2025-01-01 PROCEDURE — 84295 ASSAY OF SERUM SODIUM: CPT

## 2025-01-01 PROCEDURE — 83880 ASSAY OF NATRIURETIC PEPTIDE: CPT

## 2025-01-01 PROCEDURE — 82310 ASSAY OF CALCIUM: CPT

## 2025-01-01 PROCEDURE — 84443 ASSAY THYROID STIM HORMONE: CPT

## 2025-01-01 PROCEDURE — 93971 EXTREMITY STUDY: CPT | Mod: 26,RT

## 2025-01-01 PROCEDURE — 97162 PT EVAL MOD COMPLEX 30 MIN: CPT

## 2025-01-01 PROCEDURE — 93971 EXTREMITY STUDY: CPT

## 2025-01-01 PROCEDURE — 87186 SC STD MICRODIL/AGAR DIL: CPT

## 2025-01-01 PROCEDURE — 87077 CULTURE AEROBIC IDENTIFY: CPT

## 2025-01-01 PROCEDURE — 83605 ASSAY OF LACTIC ACID: CPT

## 2025-01-01 PROCEDURE — 99231 SBSQ HOSP IP/OBS SF/LOW 25: CPT | Mod: FS

## 2025-01-01 PROCEDURE — 87640 STAPH A DNA AMP PROBE: CPT

## 2025-01-01 PROCEDURE — 36415 COLL VENOUS BLD VENIPUNCTURE: CPT

## 2025-01-01 PROCEDURE — 82962 GLUCOSE BLOOD TEST: CPT

## 2025-01-01 PROCEDURE — 92610 EVALUATE SWALLOWING FUNCTION: CPT

## 2025-01-01 PROCEDURE — 93005 ELECTROCARDIOGRAM TRACING: CPT

## 2025-01-01 PROCEDURE — 93306 TTE W/DOPPLER COMPLETE: CPT | Mod: 26

## 2025-01-01 PROCEDURE — 80053 COMPREHEN METABOLIC PANEL: CPT

## 2025-01-01 PROCEDURE — 82803 BLOOD GASES ANY COMBINATION: CPT

## 2025-01-01 PROCEDURE — 85014 HEMATOCRIT: CPT

## 2025-01-01 PROCEDURE — 70450 CT HEAD/BRAIN W/O DYE: CPT | Mod: MC

## 2025-01-01 PROCEDURE — 99223 1ST HOSP IP/OBS HIGH 75: CPT

## 2025-01-01 PROCEDURE — G0545: CPT

## 2025-01-01 PROCEDURE — 87641 MR-STAPH DNA AMP PROBE: CPT

## 2025-01-01 PROCEDURE — 84100 ASSAY OF PHOSPHORUS: CPT

## 2025-01-01 PROCEDURE — 82947 ASSAY GLUCOSE BLOOD QUANT: CPT

## 2025-01-01 PROCEDURE — 70450 CT HEAD/BRAIN W/O DYE: CPT | Mod: 26

## 2025-01-01 PROCEDURE — 84132 ASSAY OF SERUM POTASSIUM: CPT

## 2025-01-01 PROCEDURE — 93306 TTE W/DOPPLER COMPLETE: CPT

## 2025-01-01 PROCEDURE — 93926 LOWER EXTREMITY STUDY: CPT

## 2025-01-01 PROCEDURE — 84484 ASSAY OF TROPONIN QUANT: CPT

## 2025-01-01 PROCEDURE — 81001 URINALYSIS AUTO W/SCOPE: CPT

## 2025-01-01 PROCEDURE — 80048 BASIC METABOLIC PNL TOTAL CA: CPT

## 2025-01-01 PROCEDURE — 82435 ASSAY OF BLOOD CHLORIDE: CPT

## 2025-01-01 PROCEDURE — 94640 AIRWAY INHALATION TREATMENT: CPT

## 2025-01-01 PROCEDURE — 93926 LOWER EXTREMITY STUDY: CPT | Mod: 26,RT

## 2025-01-01 PROCEDURE — 96374 THER/PROPH/DIAG INJ IV PUSH: CPT

## 2025-01-01 RX ORDER — METOPROLOL SUCCINATE 50 MG/1
2.5 TABLET, EXTENDED RELEASE ORAL ONCE
Refills: 0 | Status: COMPLETED | OUTPATIENT
Start: 2025-01-01 | End: 2025-01-01

## 2025-01-01 RX ORDER — HYDROMORPHONE/SOD CHLOR,ISO/PF 2 MG/10 ML
0.4 SYRINGE (ML) INJECTION
Refills: 0 | Status: DISCONTINUED | OUTPATIENT
Start: 2025-01-01 | End: 2025-01-01

## 2025-01-01 RX ORDER — TAMSULOSIN HYDROCHLORIDE 0.4 MG/1
1 CAPSULE ORAL
Refills: 0 | DISCHARGE

## 2025-01-01 RX ORDER — SODIUM CHLORIDE 9 G/1000ML
500 INJECTION, SOLUTION INTRAVENOUS
Refills: 0 | Status: DISCONTINUED | OUTPATIENT
Start: 2025-01-01 | End: 2025-01-01

## 2025-01-01 RX ORDER — SOD PHOS DI, MONO/K PHOS MONO 250 MG
1 TABLET ORAL ONCE
Refills: 0 | Status: COMPLETED | OUTPATIENT
Start: 2025-01-01 | End: 2025-01-01

## 2025-01-01 RX ORDER — IPRATROPIUM BROMIDE AND ALBUTEROL SULFATE .5; 2.5 MG/3ML; MG/3ML
3 SOLUTION RESPIRATORY (INHALATION) EVERY 6 HOURS
Refills: 0 | Status: DISCONTINUED | OUTPATIENT
Start: 2025-01-01 | End: 2025-01-01

## 2025-01-01 RX ORDER — ACETYLCYSTEINE 200 MG/ML
3 INHALANT RESPIRATORY (INHALATION) EVERY 6 HOURS
Refills: 0 | Status: DISCONTINUED | OUTPATIENT
Start: 2025-01-01 | End: 2025-01-01

## 2025-01-01 RX ORDER — HYDROMORPHONE/SOD CHLOR,ISO/PF 2 MG/10 ML
1 SYRINGE (ML) INJECTION
Refills: 0 | Status: DISCONTINUED | OUTPATIENT
Start: 2025-01-01 | End: 2025-01-01

## 2025-01-01 RX ORDER — SODIUM CHLORIDE 9 G/1000ML
1000 INJECTION, SOLUTION INTRAVENOUS
Refills: 0 | Status: DISCONTINUED | OUTPATIENT
Start: 2025-01-01 | End: 2025-01-01

## 2025-01-01 RX ORDER — OLANZAPINE 10 MG/1
1 TABLET ORAL
Refills: 0 | DISCHARGE

## 2025-01-01 RX ORDER — PIPERACILLIN-TAZO-DEXTROSE,ISO 3.375G/5
3.38 IV SOLUTION, PIGGYBACK PREMIX FROZEN(ML) INTRAVENOUS ONCE
Refills: 0 | Status: COMPLETED | OUTPATIENT
Start: 2025-01-01 | End: 2025-01-01

## 2025-01-01 RX ORDER — LORAZEPAM 4 MG/ML
0.5 VIAL (ML) INJECTION EVERY 6 HOURS
Refills: 0 | Status: DISCONTINUED | OUTPATIENT
Start: 2025-01-01 | End: 2025-01-01

## 2025-01-01 RX ORDER — SODIUM CHLORIDE 9 G/1000ML
500 INJECTION, SOLUTION INTRAVENOUS ONCE
Refills: 0 | Status: COMPLETED | OUTPATIENT
Start: 2025-01-01 | End: 2025-01-01

## 2025-01-01 RX ORDER — TRAZODONE HCL 100 MG
1 TABLET ORAL
Refills: 0 | DISCHARGE

## 2025-01-01 RX ORDER — MELATONIN 5 MG
1 TABLET ORAL
Refills: 0 | DISCHARGE

## 2025-01-01 RX ORDER — PIPERACILLIN-TAZO-DEXTROSE,ISO 3.375G/5
3.38 IV SOLUTION, PIGGYBACK PREMIX FROZEN(ML) INTRAVENOUS EVERY 12 HOURS
Refills: 0 | Status: COMPLETED | OUTPATIENT
Start: 2025-01-01 | End: 2025-01-01

## 2025-01-01 RX ORDER — HYDROMORPHONE/SOD CHLOR,ISO/PF 2 MG/10 ML
0.5 SYRINGE (ML) INJECTION
Refills: 0 | Status: DISCONTINUED | OUTPATIENT
Start: 2025-01-01 | End: 2025-01-01

## 2025-01-01 RX ORDER — HYDROMORPHONE/SOD CHLOR,ISO/PF 2 MG/10 ML
0.2 SYRINGE (ML) INJECTION
Refills: 0 | Status: DISCONTINUED | OUTPATIENT
Start: 2025-01-01 | End: 2025-01-01

## 2025-01-01 RX ORDER — GLYCOPYRROLATE 0.2 MG/ML
0.4 INJECTION INTRAMUSCULAR; INTRAVENOUS EVERY 6 HOURS
Refills: 0 | Status: DISCONTINUED | OUTPATIENT
Start: 2025-01-01 | End: 2025-01-01

## 2025-01-01 RX ORDER — HYDROMORPHONE/SOD CHLOR,ISO/PF 2 MG/10 ML
0.5 SYRINGE (ML) INJECTION EVERY 6 HOURS
Refills: 0 | Status: DISCONTINUED | OUTPATIENT
Start: 2025-01-01 | End: 2025-01-01

## 2025-01-01 RX ORDER — HEPARIN SODIUM 1000 [USP'U]/ML
5000 INJECTION INTRAVENOUS; SUBCUTANEOUS EVERY 12 HOURS
Refills: 0 | Status: DISCONTINUED | OUTPATIENT
Start: 2025-01-01 | End: 2025-01-01

## 2025-01-01 RX ORDER — QUETIAPINE FUMARATE 25 MG/1
1 TABLET ORAL
Refills: 0 | DISCHARGE

## 2025-01-01 RX ORDER — BISACODYL 5 MG
10 TABLET, DELAYED RELEASE (ENTERIC COATED) ORAL DAILY
Refills: 0 | Status: DISCONTINUED | OUTPATIENT
Start: 2025-01-01 | End: 2025-01-01

## 2025-01-01 RX ORDER — DOCUSATE SODIUM 100 MG
1 CAPSULE ORAL
Refills: 0 | DISCHARGE

## 2025-01-01 RX ORDER — CEFTRIAXONE 500 MG/1
1000 INJECTION, POWDER, FOR SOLUTION INTRAMUSCULAR; INTRAVENOUS ONCE
Refills: 0 | Status: COMPLETED | OUTPATIENT
Start: 2025-01-01 | End: 2025-01-01

## 2025-01-01 RX ORDER — ACETAMINOPHEN 500 MG/5ML
600 LIQUID (ML) ORAL ONCE
Refills: 0 | Status: COMPLETED | OUTPATIENT
Start: 2025-01-01 | End: 2025-01-01

## 2025-01-01 RX ORDER — ACETAMINOPHEN 500 MG/5ML
650 LIQUID (ML) ORAL EVERY 6 HOURS
Refills: 0 | Status: DISCONTINUED | OUTPATIENT
Start: 2025-01-01 | End: 2025-01-01

## 2025-01-01 RX ORDER — SENNA 187 MG
2 TABLET ORAL
Refills: 0 | DISCHARGE

## 2025-01-01 RX ADMIN — Medication 1 MILLIGRAM(S): at 01:23

## 2025-01-01 RX ADMIN — HEPARIN SODIUM 5000 UNIT(S): 1000 INJECTION INTRAVENOUS; SUBCUTANEOUS at 06:22

## 2025-01-01 RX ADMIN — Medication 0.5 MILLIGRAM(S): at 05:45

## 2025-01-01 RX ADMIN — IPRATROPIUM BROMIDE AND ALBUTEROL SULFATE 3 MILLILITER(S): .5; 2.5 SOLUTION RESPIRATORY (INHALATION) at 05:31

## 2025-01-01 RX ADMIN — Medication 0.5 MILLIGRAM(S): at 11:32

## 2025-01-01 RX ADMIN — IPRATROPIUM BROMIDE AND ALBUTEROL SULFATE 3 MILLILITER(S): .5; 2.5 SOLUTION RESPIRATORY (INHALATION) at 17:15

## 2025-01-01 RX ADMIN — Medication 0.5 MILLIGRAM(S): at 17:18

## 2025-01-01 RX ADMIN — Medication 0.5 MILLIGRAM(S): at 13:19

## 2025-01-01 RX ADMIN — Medication 0.4 MILLIGRAM(S): at 18:33

## 2025-01-01 RX ADMIN — Medication 0.5 MILLIGRAM(S): at 23:01

## 2025-01-01 RX ADMIN — Medication 600 MILLIGRAM(S): at 18:12

## 2025-01-01 RX ADMIN — Medication 0.5 MILLIGRAM(S): at 11:07

## 2025-01-01 RX ADMIN — Medication 0.5 MILLIGRAM(S): at 23:42

## 2025-01-01 RX ADMIN — HEPARIN SODIUM 5000 UNIT(S): 1000 INJECTION INTRAVENOUS; SUBCUTANEOUS at 17:21

## 2025-01-01 RX ADMIN — IPRATROPIUM BROMIDE AND ALBUTEROL SULFATE 3 MILLILITER(S): .5; 2.5 SOLUTION RESPIRATORY (INHALATION) at 06:22

## 2025-01-01 RX ADMIN — Medication 0.5 MILLIGRAM(S): at 17:22

## 2025-01-01 RX ADMIN — Medication 0.5 MILLIGRAM(S): at 05:15

## 2025-01-01 RX ADMIN — METOPROLOL SUCCINATE 2.5 MILLIGRAM(S): 50 TABLET, EXTENDED RELEASE ORAL at 21:44

## 2025-01-01 RX ADMIN — Medication 650 MILLIGRAM(S): at 10:30

## 2025-01-01 RX ADMIN — GLYCOPYRROLATE 0.4 MILLIGRAM(S): 0.2 INJECTION INTRAMUSCULAR; INTRAVENOUS at 07:33

## 2025-01-01 RX ADMIN — SODIUM CHLORIDE 50 MILLILITER(S): 9 INJECTION, SOLUTION INTRAVENOUS at 18:07

## 2025-01-01 RX ADMIN — Medication 0.5 MILLIGRAM(S): at 05:44

## 2025-01-01 RX ADMIN — Medication 0.5 MILLIGRAM(S): at 17:33

## 2025-01-01 RX ADMIN — GLYCOPYRROLATE 0.4 MILLIGRAM(S): 0.2 INJECTION INTRAMUSCULAR; INTRAVENOUS at 05:15

## 2025-01-01 RX ADMIN — SODIUM CHLORIDE 60 MILLILITER(S): 9 INJECTION, SOLUTION INTRAVENOUS at 12:18

## 2025-01-01 RX ADMIN — METOPROLOL SUCCINATE 2.5 MILLIGRAM(S): 50 TABLET, EXTENDED RELEASE ORAL at 22:22

## 2025-01-01 RX ADMIN — Medication 0.5 MILLIGRAM(S): at 17:54

## 2025-01-01 RX ADMIN — HEPARIN SODIUM 5000 UNIT(S): 1000 INJECTION INTRAVENOUS; SUBCUTANEOUS at 06:10

## 2025-01-01 RX ADMIN — Medication 0.5 MILLIGRAM(S): at 11:54

## 2025-01-01 RX ADMIN — Medication 0.5 MILLIGRAM(S): at 23:27

## 2025-01-01 RX ADMIN — IPRATROPIUM BROMIDE AND ALBUTEROL SULFATE 3 MILLILITER(S): .5; 2.5 SOLUTION RESPIRATORY (INHALATION) at 11:38

## 2025-01-01 RX ADMIN — HEPARIN SODIUM 5000 UNIT(S): 1000 INJECTION INTRAVENOUS; SUBCUTANEOUS at 05:32

## 2025-01-01 RX ADMIN — Medication 0.5 MILLIGRAM(S): at 23:32

## 2025-01-01 RX ADMIN — HEPARIN SODIUM 5000 UNIT(S): 1000 INJECTION INTRAVENOUS; SUBCUTANEOUS at 06:13

## 2025-01-01 RX ADMIN — Medication 0.5 MILLIGRAM(S): at 15:15

## 2025-01-01 RX ADMIN — Medication 0.5 MILLIGRAM(S): at 18:09

## 2025-01-01 RX ADMIN — Medication 0.5 MILLIGRAM(S): at 23:17

## 2025-01-01 RX ADMIN — CEFTRIAXONE 100 MILLIGRAM(S): 500 INJECTION, POWDER, FOR SOLUTION INTRAMUSCULAR; INTRAVENOUS at 22:09

## 2025-01-01 RX ADMIN — Medication 240 MILLIGRAM(S): at 17:00

## 2025-01-01 RX ADMIN — Medication 1 MILLIGRAM(S): at 13:54

## 2025-01-01 RX ADMIN — Medication 0.5 MILLIGRAM(S): at 11:30

## 2025-01-01 RX ADMIN — Medication 0.5 MILLIGRAM(S): at 05:39

## 2025-01-01 RX ADMIN — IPRATROPIUM BROMIDE AND ALBUTEROL SULFATE 3 MILLILITER(S): .5; 2.5 SOLUTION RESPIRATORY (INHALATION) at 05:11

## 2025-01-01 RX ADMIN — Medication 0.5 MILLIGRAM(S): at 23:08

## 2025-01-01 RX ADMIN — SODIUM CHLORIDE 500 MILLILITER(S): 9 INJECTION, SOLUTION INTRAVENOUS at 19:23

## 2025-01-01 RX ADMIN — Medication 100 MILLIEQUIVALENT(S): at 15:28

## 2025-01-01 RX ADMIN — HEPARIN SODIUM 5000 UNIT(S): 1000 INJECTION INTRAVENOUS; SUBCUTANEOUS at 17:39

## 2025-01-01 RX ADMIN — Medication 0.5 MILLIGRAM(S): at 05:59

## 2025-01-01 RX ADMIN — Medication 0.5 MILLIGRAM(S): at 05:24

## 2025-01-01 RX ADMIN — Medication 0.5 MILLIGRAM(S): at 11:48

## 2025-01-01 RX ADMIN — Medication 650 MILLIGRAM(S): at 08:12

## 2025-01-01 RX ADMIN — Medication 25 GRAM(S): at 17:21

## 2025-01-01 RX ADMIN — Medication 0.5 MILLIGRAM(S): at 15:13

## 2025-01-01 RX ADMIN — IPRATROPIUM BROMIDE AND ALBUTEROL SULFATE 3 MILLILITER(S): .5; 2.5 SOLUTION RESPIRATORY (INHALATION) at 23:02

## 2025-01-01 RX ADMIN — HEPARIN SODIUM 5000 UNIT(S): 1000 INJECTION INTRAVENOUS; SUBCUTANEOUS at 05:11

## 2025-01-01 RX ADMIN — IPRATROPIUM BROMIDE AND ALBUTEROL SULFATE 3 MILLILITER(S): .5; 2.5 SOLUTION RESPIRATORY (INHALATION) at 11:11

## 2025-01-01 RX ADMIN — Medication 25 GRAM(S): at 09:08

## 2025-01-01 RX ADMIN — IPRATROPIUM BROMIDE AND ALBUTEROL SULFATE 3 MILLILITER(S): .5; 2.5 SOLUTION RESPIRATORY (INHALATION) at 12:18

## 2025-01-01 RX ADMIN — IPRATROPIUM BROMIDE AND ALBUTEROL SULFATE 3 MILLILITER(S): .5; 2.5 SOLUTION RESPIRATORY (INHALATION) at 23:20

## 2025-01-01 RX ADMIN — Medication 25 GRAM(S): at 06:10

## 2025-01-01 RX ADMIN — Medication 0.5 MILLIGRAM(S): at 12:09

## 2025-01-01 RX ADMIN — Medication 1 MILLIGRAM(S): at 15:58

## 2025-01-01 RX ADMIN — Medication 25 GRAM(S): at 06:13

## 2025-01-01 RX ADMIN — SODIUM CHLORIDE 100 MILLILITER(S): 9 INJECTION, SOLUTION INTRAVENOUS at 05:52

## 2025-01-01 RX ADMIN — Medication 16.67 MILLILITER(S): at 23:24

## 2025-01-01 RX ADMIN — IPRATROPIUM BROMIDE AND ALBUTEROL SULFATE 3 MILLILITER(S): .5; 2.5 SOLUTION RESPIRATORY (INHALATION) at 05:17

## 2025-01-01 RX ADMIN — Medication 0.5 MILLIGRAM(S): at 17:17

## 2025-01-01 RX ADMIN — Medication 1 MILLIGRAM(S): at 14:10

## 2025-01-01 RX ADMIN — Medication 0.5 MILLIGRAM(S): at 11:36

## 2025-01-01 RX ADMIN — Medication 0.2 MILLIGRAM(S): at 05:22

## 2025-01-01 RX ADMIN — HEPARIN SODIUM 5000 UNIT(S): 1000 INJECTION INTRAVENOUS; SUBCUTANEOUS at 17:32

## 2025-01-01 RX ADMIN — Medication 0.5 MILLIGRAM(S): at 07:15

## 2025-01-01 RX ADMIN — SODIUM CHLORIDE 50 MILLILITER(S): 9 INJECTION, SOLUTION INTRAVENOUS at 05:58

## 2025-01-01 RX ADMIN — Medication 1 MILLIGRAM(S): at 08:20

## 2025-01-01 RX ADMIN — Medication 25 GRAM(S): at 05:11

## 2025-01-01 RX ADMIN — ACETYLCYSTEINE 3 MILLILITER(S): 200 INHALANT RESPIRATORY (INHALATION) at 17:39

## 2025-01-01 RX ADMIN — Medication 0.5 MILLIGRAM(S): at 17:42

## 2025-01-01 RX ADMIN — IPRATROPIUM BROMIDE AND ALBUTEROL SULFATE 3 MILLILITER(S): .5; 2.5 SOLUTION RESPIRATORY (INHALATION) at 17:39

## 2025-01-01 RX ADMIN — Medication 0.5 MILLIGRAM(S): at 00:17

## 2025-01-01 RX ADMIN — IPRATROPIUM BROMIDE AND ALBUTEROL SULFATE 3 MILLILITER(S): .5; 2.5 SOLUTION RESPIRATORY (INHALATION) at 22:52

## 2025-01-01 RX ADMIN — Medication 0.5 MILLIGRAM(S): at 12:49

## 2025-01-01 RX ADMIN — Medication 25 GRAM(S): at 17:32

## 2025-01-01 RX ADMIN — Medication 0.5 MILLIGRAM(S): at 18:30

## 2025-01-01 RX ADMIN — Medication 1 MILLIGRAM(S): at 02:45

## 2025-01-01 RX ADMIN — METOPROLOL SUCCINATE 2.5 MILLIGRAM(S): 50 TABLET, EXTENDED RELEASE ORAL at 09:57

## 2025-01-01 RX ADMIN — SODIUM CHLORIDE 75 MILLILITER(S): 9 INJECTION, SOLUTION INTRAVENOUS at 17:29

## 2025-01-01 RX ADMIN — IPRATROPIUM BROMIDE AND ALBUTEROL SULFATE 3 MILLILITER(S): .5; 2.5 SOLUTION RESPIRATORY (INHALATION) at 11:44

## 2025-01-01 RX ADMIN — IPRATROPIUM BROMIDE AND ALBUTEROL SULFATE 3 MILLILITER(S): .5; 2.5 SOLUTION RESPIRATORY (INHALATION) at 17:53

## 2025-01-01 RX ADMIN — Medication 0.5 MILLIGRAM(S): at 06:09

## 2025-01-01 RX ADMIN — HEPARIN SODIUM 5000 UNIT(S): 1000 INJECTION INTRAVENOUS; SUBCUTANEOUS at 05:59

## 2025-01-01 RX ADMIN — Medication 0.5 MILLIGRAM(S): at 11:33

## 2025-01-01 RX ADMIN — Medication 25 GRAM(S): at 17:22

## 2025-01-01 RX ADMIN — Medication 25 GRAM(S): at 17:39

## 2025-01-01 RX ADMIN — Medication 0.5 MILLIGRAM(S): at 23:09

## 2025-01-01 RX ADMIN — Medication 25 GRAM(S): at 17:29

## 2025-01-01 RX ADMIN — Medication 0.5 MILLIGRAM(S): at 07:33

## 2025-01-01 RX ADMIN — Medication 0.5 MILLIGRAM(S): at 11:15

## 2025-01-01 RX ADMIN — ACETYLCYSTEINE 3 MILLILITER(S): 200 INHALANT RESPIRATORY (INHALATION) at 00:39

## 2025-01-01 RX ADMIN — Medication 0.5 MILLIGRAM(S): at 17:25

## 2025-01-01 RX ADMIN — Medication 0.5 MILLIGRAM(S): at 06:28

## 2025-01-01 RX ADMIN — SODIUM CHLORIDE 75 MILLILITER(S): 9 INJECTION, SOLUTION INTRAVENOUS at 03:13

## 2025-01-01 RX ADMIN — Medication 1 MILLIGRAM(S): at 04:00

## 2025-01-01 RX ADMIN — Medication 0.5 MILLIGRAM(S): at 18:00

## 2025-01-01 RX ADMIN — HEPARIN SODIUM 5000 UNIT(S): 1000 INJECTION INTRAVENOUS; SUBCUTANEOUS at 17:45

## 2025-01-01 RX ADMIN — IPRATROPIUM BROMIDE AND ALBUTEROL SULFATE 3 MILLILITER(S): .5; 2.5 SOLUTION RESPIRATORY (INHALATION) at 17:21

## 2025-01-01 RX ADMIN — Medication 1 MILLIGRAM(S): at 03:26

## 2025-01-01 RX ADMIN — IPRATROPIUM BROMIDE AND ALBUTEROL SULFATE 3 MILLILITER(S): .5; 2.5 SOLUTION RESPIRATORY (INHALATION) at 06:00

## 2025-01-01 RX ADMIN — Medication 0.4 MILLIGRAM(S): at 09:10

## 2025-01-01 RX ADMIN — SODIUM CHLORIDE 100 MILLILITER(S): 9 INJECTION, SOLUTION INTRAVENOUS at 14:36

## 2025-01-01 RX ADMIN — Medication 25 GRAM(S): at 06:03

## 2025-01-01 RX ADMIN — Medication 25 GRAM(S): at 17:47

## 2025-01-01 RX ADMIN — Medication 1 MILLIGRAM(S): at 02:30

## 2025-01-01 RX ADMIN — Medication 0.5 MILLIGRAM(S): at 23:38

## 2025-01-01 RX ADMIN — Medication 25 GRAM(S): at 05:59

## 2025-01-01 RX ADMIN — Medication 0.4 MILLIGRAM(S): at 05:52

## 2025-01-01 RX ADMIN — Medication 200 GRAM(S): at 05:23

## 2025-01-01 RX ADMIN — IPRATROPIUM BROMIDE AND ALBUTEROL SULFATE 3 MILLILITER(S): .5; 2.5 SOLUTION RESPIRATORY (INHALATION) at 00:41

## 2025-01-01 RX ADMIN — Medication 100 MILLIEQUIVALENT(S): at 14:36

## 2025-01-01 RX ADMIN — Medication 0.5 MILLIGRAM(S): at 12:00

## 2025-01-01 RX ADMIN — Medication 0.5 MILLIGRAM(S): at 17:02

## 2025-01-01 RX ADMIN — Medication 25 GRAM(S): at 05:17

## 2025-01-01 RX ADMIN — IPRATROPIUM BROMIDE AND ALBUTEROL SULFATE 3 MILLILITER(S): .5; 2.5 SOLUTION RESPIRATORY (INHALATION) at 00:38

## 2025-01-01 RX ADMIN — IPRATROPIUM BROMIDE AND ALBUTEROL SULFATE 3 MILLILITER(S): .5; 2.5 SOLUTION RESPIRATORY (INHALATION) at 06:13

## 2025-01-01 RX ADMIN — Medication 0.5 MILLIGRAM(S): at 05:31

## 2025-01-01 RX ADMIN — IPRATROPIUM BROMIDE AND ALBUTEROL SULFATE 3 MILLILITER(S): .5; 2.5 SOLUTION RESPIRATORY (INHALATION) at 17:38

## 2025-01-01 RX ADMIN — Medication 0.5 MILLIGRAM(S): at 17:40

## 2025-01-01 RX ADMIN — Medication 0.5 MILLIGRAM(S): at 12:48

## 2025-01-01 RX ADMIN — HEPARIN SODIUM 5000 UNIT(S): 1000 INJECTION INTRAVENOUS; SUBCUTANEOUS at 17:22

## 2025-01-01 RX ADMIN — Medication 0.5 MILLIGRAM(S): at 05:06

## 2025-01-01 RX ADMIN — Medication 0.5 MILLIGRAM(S): at 17:32

## 2025-01-01 RX ADMIN — HEPARIN SODIUM 5000 UNIT(S): 1000 INJECTION INTRAVENOUS; SUBCUTANEOUS at 05:22

## 2025-01-01 RX ADMIN — Medication 0.5 MILLIGRAM(S): at 11:17

## 2025-01-01 RX ADMIN — GLYCOPYRROLATE 0.4 MILLIGRAM(S): 0.2 INJECTION INTRAMUSCULAR; INTRAVENOUS at 04:00

## 2025-01-01 RX ADMIN — IPRATROPIUM BROMIDE AND ALBUTEROL SULFATE 3 MILLILITER(S): .5; 2.5 SOLUTION RESPIRATORY (INHALATION) at 17:32

## 2025-01-01 RX ADMIN — IPRATROPIUM BROMIDE AND ALBUTEROL SULFATE 3 MILLILITER(S): .5; 2.5 SOLUTION RESPIRATORY (INHALATION) at 11:40

## 2025-01-01 RX ADMIN — Medication 0.5 MILLIGRAM(S): at 23:24

## 2025-01-01 RX ADMIN — HEPARIN SODIUM 5000 UNIT(S): 1000 INJECTION INTRAVENOUS; SUBCUTANEOUS at 05:17

## 2025-01-01 RX ADMIN — Medication 0.5 MILLIGRAM(S): at 11:21

## 2025-01-01 RX ADMIN — SODIUM CHLORIDE 50 MILLILITER(S): 9 INJECTION, SOLUTION INTRAVENOUS at 17:34

## 2025-01-01 RX ADMIN — Medication 1 MILLIGRAM(S): at 08:55

## 2025-01-01 RX ADMIN — SODIUM CHLORIDE 60 MILLILITER(S): 9 INJECTION, SOLUTION INTRAVENOUS at 17:43

## 2025-01-01 RX ADMIN — Medication 0.5 MILLIGRAM(S): at 00:02

## 2025-01-01 RX ADMIN — Medication 0.5 MILLIGRAM(S): at 05:46

## 2025-01-01 RX ADMIN — Medication 0.5 MILLIGRAM(S): at 23:23

## 2025-01-01 RX ADMIN — IPRATROPIUM BROMIDE AND ALBUTEROL SULFATE 3 MILLILITER(S): .5; 2.5 SOLUTION RESPIRATORY (INHALATION) at 00:26

## 2025-01-01 RX ADMIN — GLYCOPYRROLATE 0.4 MILLIGRAM(S): 0.2 INJECTION INTRAMUSCULAR; INTRAVENOUS at 07:51

## 2025-01-01 RX ADMIN — Medication 0.5 MILLIGRAM(S): at 06:13

## 2025-01-01 RX ADMIN — ACETYLCYSTEINE 3 MILLILITER(S): 200 INHALANT RESPIRATORY (INHALATION) at 05:32

## 2025-03-18 NOTE — ED ADULT NURSE NOTE - OBJECTIVE STATEMENT
pt is a 89yo female PMH dementia, CKD, HLD, DNR/DNI BIBEMS from the [place] complaining of shortness of breath. per EMS, pt A/Ox0 at baseline, minimally verbally responsive (reports pt utters 1 to 2 words), pt found to be experiencing worsening AMS at the [place], pt found hypoxic to 88% on RA. tachypneic to the 20s, placed on 15L NRB with improvement to 95%+. on arrival to ED, pt placed on 3L nc, maintaining oxygen saturation of 96%, pt nonverbal on arrival, eyes open to pain, MD Melgoza at bedside to assess pt. pt placed on cardiac monitor, AFib to the 90s. respirations appear tachypneic to 26, shallow.

## 2025-03-18 NOTE — ED PROVIDER NOTE - CLINICAL SUMMARY MEDICAL DECISION MAKING FREE TEXT BOX
- A 89-year-old female with a past medical history of dementia baseline non verbal, CKD, anxiety/depression, hyperlipidemia, presents to the emergency department from [facility] for worsening respiratory status and worsening mental status. pt is DNR/DNI. The patient's baseline is minimally verbally responsive but has gone non-verbal since this afternoon. She was also found to be hypoxic in high 80s by EMS and was put on NRB up to 99%. Her code status is DNR/DNI. However, she is open to antibiotics and dialysis treatments if required.  ams resp distress  labs, CXR, IVF, UA/UC, EKG, cardiac monitoring, reassess

## 2025-03-18 NOTE — ED ADULT NURSE REASSESSMENT NOTE - NS ED NURSE REASSESS COMMENT FT1
PT straight catheterized under sterile technique with 2 RN's at the bedside as per MD order. PT tolerated well. Approximately 600mL dark, yellow urine drained. UA/UC sent as per MD order. Safety and comfort maintained.

## 2025-03-18 NOTE — ED ADULT NURSE REASSESSMENT NOTE - NS ED NURSE REASSESS COMMENT FT1
Report received from Eleuterio RN. Pt A&Ox0, in no acute distress at time. Patient awaiting bed assignment. Patient safety maintained, bed is in lowest position, wheels locked, and side rails raised. Patient oriented to call bell, and call bell is within reach.

## 2025-03-18 NOTE — ED PROVIDER NOTE - OBJECTIVE STATEMENT
- A 89-year-old female with a past medical history of dementia baseline non verbal, CKD, anxiety/depression, hyperlipidemia, presents to the emergency department from [facility] for worsening respiratory status and worsening mental status. pt is DNR/DNI. The patient's baseline is minimally verbally responsive but has gone non-verbal since this afternoon. She was also found to be hypoxic in high 80s by EMS and was put on NRB up to 99%. Her code status is DNR/DNI. However, she is open to antibiotics and dialysis treatments if required.

## 2025-03-18 NOTE — ED PROVIDER NOTE - ATTENDING CONTRIBUTION TO CARE
89 yo F with PMHx of dementia, nonverbal baseline, CKD, anxiety/depression, hyperlipidemia presents for evaluation after worsening mental status and tachypnea. As per EMS patient with hypoxia prompting NRB     PE: tachypneic. Nonverbal baseline, responds to intermittent stimuli. Neuro nonfocal.  Skin intact. Psych normal mood.    MDM: Differential diagnosis includes but is not limited to LUAN, dehydration, pneumonia, URI, UTI   Will assess with labs, XR,UA  Patient is DNR/DNI as per family and MOLST

## 2025-03-18 NOTE — ED PROVIDER NOTE - PHYSICAL EXAMINATION
On Physical Exam:  General: cachectic in NAD, non verbal  HEENT: PERRL, MMM  Neck: no neck tenderness, no nuchal rigidity  Cardiac: irregular s1, s2; no MGR  Lungs: CTABL  Abdomen: soft nontender/nondistended  : no bladder tenderness or distension  Skin: warm, intact, no rash  Extremities: no peripheral edema, no gross deformities  Neuro: responsive to pain

## 2025-03-18 NOTE — ED ADULT NURSE NOTE - NSFALLHARMRISKINTERV_ED_ALL_ED
Assistance OOB with selected safe patient handling equipment if applicable/Assistance with ambulation/Communicate risk of Fall with Harm to all staff, patient, and family/Monitor gait and stability/Monitor for mental status changes and reorient to person, place, and time, as needed/Provide visual cue: red socks, yellow wristband, yellow gown, etc/Reinforce activity limits and safety measures with patient and family/Toileting schedule using arm’s reach rule for commode and bathroom/Use of alarms - bed, stretcher, chair and/or video monitoring/Bed in lowest position, wheels locked, appropriate side rails in place/Call bell, personal items and telephone in reach/Instruct patient to call for assistance before getting out of bed/chair/stretcher/Non-slip footwear applied when patient is off stretcher/[place] to call system/Physically safe environment - no spills, clutter or unnecessary equipment/Purposeful Proactive Rounding/Room/bathroom lighting operational, light cord in reach

## 2025-03-19 NOTE — PATIENT PROFILE ADULT - FUNCTIONAL SCREEN CURRENT LEVEL: COMMUNICATION, MLM
SBP < 100, HR < 60 at this time.
2 = difficulty understanding and speaking (not related to language barrier)

## 2025-03-19 NOTE — CONSULT NOTE ADULT - ASSESSMENT
89y old Female with PMHx of HLD, CKD3, legally blind, b/l SNHL, dementia (minimally verbally responsive baseline per ED), urinary retention, nontoxic goiter, GERD, constipation, anxiety, depression, R femur fx, presents from [facility] reportedly for respiratory distress (hypoxemia to SpO2% 80s by EMS) and decreased responsiveness.    Upon presentation, patient was afebrile, hypoxic placed on NRBM switched to NC  Labs showed leukocytosis of 15.8-->14K    Workup:  -Covid/RSV/Flu neg   -UA: , many bacteria, + LE and nitrite   -CT chest: Mild bronchiectasis in the left lower lobe with peribronchial wall thickening which may likely inflammatory.  Pulmonary nodules measuring up to 6 mm in the lung parenchyma. 5 mm polyp versus adherent mucus in upper trachea. Attention to this area on follow-up imaging is recommended.Cardiomegaly.1.7 cm hypoattenuating nodule with internal calcification the right thyroid lobe. Recommend nonemergent thyroid ultrasound to further evaluate.    #Acute hypoxic respiratory failure, possible aspiration   #Acute encephalopathy   #Leukocytosis   #LUAN   #?UTI    Recommendations:   -Continue Zosyn for now   -F/up urine Cx   -F/up blood Cx   -SLP eval   -Aspiration precautions  -F/up imaging for tracheal polyp Vs mucus per primary team     Discussed with primary team     Jeremy Ratliff MD  ID physician  [facility] Preferred  After 5pm/weekends call [phone]

## 2025-03-19 NOTE — SWALLOW BEDSIDE ASSESSMENT ADULT - SWALLOW EVAL: RECOMMENDED DIET
Strict NPO, with non-oral nutrition/hydration/medications. Proceed with GOC conversation re: provision of nutrition/ lack of oral route in this patient with advanced dementia who has previously stated she would not want a feeding tube.

## 2025-03-19 NOTE — SWALLOW BEDSIDE ASSESSMENT ADULT - SWALLOW EVAL: PATIENT/FAMILY GOALS STATEMENT
Pt's  and family friend at bedside report issues re: pocketing of food over the past ~6 weeks, acutely worsened the past few days requiring manual removal of food from the oral cavity.

## 2025-03-19 NOTE — H&P ADULT - TIME BILLING
- Ordering, reviewing, and interpreting labs, testing, and imaging.  - Independently obtaining a review of systems and performing a physical exam  - Counselling and educating patient and emergency contact regarding interpretation of aforementioned items and plan of care.

## 2025-03-19 NOTE — CONSULT NOTE ADULT - ASSESSMENT
89F PMH HLD, CKD3, legally blind, b/l SNHL, dementia (minimally verbally responsive baseline per ED), urinary retention, nontoxic goiter, GERD, constipation, anxiety, depression, R femur fx, presents from [facility] group home reportedly for respiratory distress (hypoxemia to SpO2% 80s by EMS) and decreased responsiveness a/w sepsis 2/2 PNA/UTI c/f aspiration      Wound Consult requested to assist w/ management of Rt heel DTI  BLE PAD  Xerosis  Buttocks w/ Moisture Dermatitis    Rt Heel - CAVILON QD, consider DPM  Consider JUAN/PVR if in line w/ GOC  RLE Duplex no DVT  BLE Arterial Duplex - results noted above  Abx per Medicine/ ID  Moisturize intact skin w/ SWEEN cream BID  Nutrition Consult for optimization        encourage high quality protein, chelsey/ prosource, MVI & Vit C to promote wound healing  Continue turning and positioning w/ offloading assistive devices as per protocol  Buttocks/ Sacrum Aisha BID and prn soiling        Continue w/ attends under pads and Pericare primafit care as per protocol  Waffle Cushion to chair when oob to chair  Continue w/ low air loss pressure redistribution bed surface   Care as per medicine, remain available as requested  Upon discharge f/u as outpatient at [facility] [address] [facility] [phone]  Seen w/ attng and D/w team & RN  Thank you for this consult  Mirta Guillen PA-C CWS [id number]  Nights/ Weekends/ Holidays please call:  General Surgery Consult pager ([phone]) for emergencies  Wound PT for multilayer leg wrapping or VAC issues (x [phone])   I spent 55minutes face to face w/ this pt of which more than 50% of the time was spent counseling & coordinating care of this pt.  89F PMH HLD, CKD3, legally blind, b/l SNHL, dementia (minimally verbally responsive baseline per ED), urinary retention, nontoxic goiter, GERD, constipation, anxiety, depression, R femur fx, presents from [facility] long-term reportedly for respiratory distress (hypoxemia to SpO2% 80s by EMS) and decreased responsiveness a/w sepsis 2/2 PNA/UTI c/f aspiration      Wound Consult requested to assist w/ management of Rt heel DTI  BLE PAD  Xerosis  Buttocks w/ Moisture Dermatitis    Rt Heel - CAVILON QD, consider DPM  RLE Duplex no DVT  BLE Arterial Duplex - results noted above  Abx per Medicine/ ID  Moisturize intact skin w/ SWEEN cream BID  Nutrition Consult for optimization        encourage high quality protein, chelsey/ prosource, MVI & Vit C to promote wound healing  Continue turning and positioning w/ offloading assistive devices as per protocol  Buttocks/ Sacrum Aisha BID and prn soiling        Continue w/ attends under pads and Pericare primafit care as per protocol  Waffle Cushion to chair when oob to chair  Continue w/ low air loss pressure redistribution bed surface   Care as per medicine, remain available as requested  Upon discharge f/u as outpatient at [facility] [address] [facility] [phone]  Seen w/ attng and D/w team & RN  Thank you for this consult  Mirta Guillen PA-C CWS [id number]  Nights/ Weekends/ Holidays please call:  General Surgery Consult pager ([phone]) for emergencies  Wound PT for multilayer leg wrapping or VAC issues (x [phone])   I spent 55minutes face to face w/ this pt of which more than 50% of the time was spent counseling & coordinating care of this pt.  89F PMH HLD, CKD3, legally blind, b/l SNHL, dementia (minimally verbally responsive baseline per ED), urinary retention, nontoxic goiter, GERD, constipation, anxiety, depression, R femur fx, presents from [facility] skilled nursing reportedly for respiratory distress (hypoxemia to SpO2% 80s by EMS) and decreased responsiveness a/w sepsis 2/2 PNA/UTI c/f aspiration      Wound Consult requested to assist w/ management of:  Rt heel DTI  Xerosis  Buttocks w/ Moisture Dermatitis    Rt Heel - CAVILON QD, consider DPM  RLE Duplex no DVT  BLE Arterial Duplex - results noted above  Abx per Medicine/ ID  Moisturize intact skin w/ SWEEN cream BID  Nutrition Consult for optimization        encourage high quality protein, chelsey/ prosource, MVI & Vit C to promote wound healing  Continue turning and positioning w/ offloading assistive devices as per protocol  Buttocks/ Sacrum Aisha BID and prn soiling        Continue w/ attends under pads and Pericare primafit care as per protocol  Waffle Cushion to chair when oob to chair  Continue w/ low air loss pressure redistribution bed surface   Care as per medicine, remain available as requested  Upon discharge f/u as outpatient at Wound Center [address] [place] [address] [phone]  Seen w/ attng and D/w team & RN  Thank you for this consult  Mirta Guillen PA-C CWS [id number]  Nights/ Weekends/ Holidays please call:  General Surgery Consult pager ([phone]) for emergencies  Wound PT for multilayer leg wrapping or VAC issues (x [phone])

## 2025-03-19 NOTE — H&P ADULT - PROBLEM SELECTOR PLAN 12
- HSQ VTE PPx  - NPO pending formal S&S  - precautions above  - hx constipation though holding PO meds at this time, consider suppository/enema pending monitoring of BMs  - hx urinary retention, monitor BS per routine  - wound c/s (c/f R heel DTI)  - dispo pending course

## 2025-03-19 NOTE — SWALLOW BEDSIDE ASSESSMENT ADULT - SLP PERTINENT HISTORY OF CURRENT PROBLEM
Message sent to patient   89F PMH HLD, CKD3, legally blind, b/l SNHL, dementia (minimally verbally responsive baseline per ED), urinary retention, nontoxic goiter, GERD, constipation, anxiety, depression, R femur fx, presents from [facility] reportedly for respiratory distress (hypoxemia to SpO2% 80s by EMS) and decreased responsiveness. Patient nonverbal and unable to participate in interview. Collateral obtained from emergency contact via phone Rhiannon Santiago (family friend for many years) who reports that patient was recently found to be "packing food and medications in the back of her mouth", otherwise confirms aforementioned hx and states that patient has been nonverbal x 1 mo. Pt a/w sepsis 2/2 PNA/UTI c/f aspiration. SIRS+ (HR, RR, WBC) with reported packing of food in back of mouth, though CXR w/o consolidation, would be c/f aspiration PNA as source. Additionally +UA unable to verify if sx. S/p CTX in ED. NPO pending formal S&S.

## 2025-03-19 NOTE — H&P ADULT - PROBLEM SELECTOR PLAN 3
presume 2/2 aspiration PNA however given RLE pitting edema would r/o PE.   - given CKD3 state, will hold CTA chest ON and instead obtain VA duplex RLE (will obtain both venous in addition to arterial given pulse R foot < L foot) as well as DDimer  - wean O2 as tolerates and f/u CT chest NC as above  - euvolemic-appearing, though proBNP elevated. Will obtain TTE eval for HF/RHS   - repeat gas in AM given hypercapnia

## 2025-03-19 NOTE — SWALLOW BEDSIDE ASSESSMENT ADULT - PHARYNGEAL PHASE
Absent pharyngeal swallow trigger x1 pharyngeal swallow trigger palpated, however, with significant oral residue remaining in spite of swallow.

## 2025-03-19 NOTE — CHART NOTE - NSCHARTNOTEFT_GEN_A_CORE
Chart reviewed/ patient examined  ID evaluation   Pulmonary evaluation  Neurology evaluation   Hydration  Further action as per clinical course   Andres James MD phone [phone] Chart reviewed/ patient examined  ID evaluation   Pulmonary evaluation  Neurology evaluation   CT head, B12, TSH  Hydration  DNR/ DNI  GOC to be addressed   Further action as per clinical course   Andres James MD phone [phone]

## 2025-03-19 NOTE — PATIENT PROFILE ADULT - FALL HARM RISK - HARM RISK INTERVENTIONS

## 2025-03-19 NOTE — H&P ADULT - PROBLEM SELECTOR PLAN 11
- HSQ VTE PPx  - NPO pending formal S&S  - precautions above  - hx constipation though holding PO meds at this time, consider suppository/enema pending monitoring of BMs  - hx urinary retention, monitor BS per routine  - wound c/s (c/f R heel DTI)  - dispo pending course - skilled nursing paperwork with cutoff list, will need to contact in AM to clarify

## 2025-03-19 NOTE — CONSULT NOTE ADULT - SUBJECTIVE AND OBJECTIVE BOX
HPI:    89y old Female with PMHx of HLD, CKD3, legally blind, b/l SNHL, dementia (minimally verbally responsive baseline per ED), urinary retention, nontoxic goiter, GERD, constipation, anxiety, depression, R femur fx, presents from [facility] reportedly for respiratory distress (hypoxemia to SpO2% 80s by EMS) and decreased responsiveness. Patient nonverbal and unable to participate in interview. Collateral obtained from emergency contact via phone Rhiannon Santiago (family friend for many years) who reports that patient was recently found to be "packing food and medications in the back of her mouth", otherwise confirms aforementioned hx and states that patient has been nonverbal x 1 mo.    ID consulted for concern of aspiration PNA and UTI.      REVIEW OF SYSTEMS  [X] ROS unobtainable because:  non verbal   [  ] All other systems negative except as noted below    Constitutional:  [ ] fever [ ] chills  [ ] weight loss  [ ]night sweat  [ ]poor appetite/PO intake [ ]fatigue   Skin:  [ ] rash [ ] phlebitis	  Eyes: [ ] icterus [ ] pain  [ ] discharge	  ENMT: [ ] sore throat  [ ] thrush [ ] ulcers [ ] exudates [ ]anosmia  Respiratory: [ ] dyspnea [ ] hemoptysis [ ] cough [ ] sputum	  Cardiovascular:  [ ] chest pain [ ] palpitations [ ] edema	  Gastrointestinal:  [ ] nausea [ ] vomiting [ ] diarrhea [ ] constipation [ ] pain	  Genitourinary:  [ ] dysuria [ ] frequency [ ] hematuria [ ] discharge [ ] flank pain  [ ] incontinence  Musculoskeletal:  [ ] myalgias [ ] arthralgias [ ] arthritis  [ ] back pain  Neurological:  [ ] headache [ ] weakness [ ] seizures  [ ] confusion/altered mental status    prior hospital charts reviewed [V]  primary team notes reviewed [V]  other consultant notes reviewed [V]    PAST MEDICAL & SURGICAL HISTORY:  HLD (hyperlipidemia)      Chronic kidney disease, unspecified CKD stage      Dementia      H/O urinary retention      Anxiety and depression      Legally blind      Hard of hearing      H/O fracture of femur      Nontoxic goiter      H/O constipation      GERD (gastroesophageal reflux disease)          SOCIAL HISTORY:  - Denied smoking/vaping/alcohol/recreational drug use    FAMILY HISTORY:      Allergies  No Known Allergies        ANTIMICROBIALS:  piperacillin/tazobactam IVPB.. 3.375 every 12 hours      ANTIMICROBIALS (past 90 days):  MEDICATIONS  (STANDING):  cefTRIAXone   IVPB   100 mL/Hr IV Intermittent (03-18-25 @ 22:09)    piperacillin/tazobactam IVPB.   200 mL/Hr IV Intermittent (03-19-25 @ 05:23)    piperacillin/tazobactam IVPB.-   25 mL/Hr IV Intermittent (03-19-25 @ 09:08)        OTHER MEDS:   MEDICATIONS  (STANDING):  heparin   Injectable 5000 every 12 hours      VITALS:  Vital Signs Last 24 Hrs  T(F): 97.9 (03-19-25 @ 08:30), Max: 98 (03-18-25 @ 23:15)    Vital Signs Last 24 Hrs  HR: 74 (03-19-25 @ 08:30) (74 - 94)  BP: 150/67 (03-19-25 @ 08:30) (141/77 - 171/69)  RR: 19 (03-19-25 @ 08:30)  SpO2: 97% (03-19-25 @ 08:30) (94% - 100%)  Wt(kg): --    EXAM:  Physical Exam:  Constitutional:  comfortable  LUNGS:  CTA, on NC   CVS:  normal S1, S2  Abd:  soft, non-tender  Ext:  no edema  Neuro: Alert, non verbal     Labs:                        11.7   14.22 )-----------( 362      ( 19 Mar 2025 07:23 )             38.8     03-19    151[H]  |  112[H]  |  37[H]  ----------------------------<  94  4.2   |  21[L]  |  1.27    Ca    10.5      19 Mar 2025 07:23  Phos  3.3     03-19  Mg     2.1     03-19    TPro  7.0  /  Alb  3.5  /  TBili  0.6  /  DBili  x   /  AST  14  /  ALT  21  /  AlkPhos  108  03-18      WBC Trend:  WBC Count: 14.22 (03-19-25 @ 07:23)  WBC Count: 15.80 (03-18-25 @ 19:31)          Creatine Trend:  Creatinine: 1.27 (03-19)  Creatinine: 1.31 (03-18)      Liver Biochemical Testing Trend:  Alanine Aminotransferase (ALT/SGPT): 21 (03-18)  Aspartate Aminotransferase (AST/SGOT): 14 (03-18-25 @ 19:31)  Bilirubin Total: 0.6 (03-18)      Trend LDH          Urinalysis Basic - ( 19 Mar 2025 07:23 )    Color: x / Appearance: x / SG: x / pH: x  Gluc: 94 mg/dL / Ketone: x  / Bili: x / Urobili: x   Blood: x / Protein: x / Nitrite: x   Leuk Esterase: x / RBC: x / WBC x   Sq Epi: x / Non Sq Epi: x / Bacteria: x    MICROBIOLOGY:    D-Dimer Assay, Quantitative: 964 (03-19)    Troponin T, High Sensitivity Result: 65 (03-18)  Troponin T, High Sensitivity Result: 64 (03-18)    Blood Gas Venous - Lactate: 3.6 (03-19 @ 09:58)  Blood Gas Venous - Lactate: 1.9 (03-18 @ 19:25)    RADIOLOGY:  imaging below personally reviewed      < from: CT Chest No Cont (03.19.25 @ 08:55) >    IMPRESSION:  Mild bronchiectasis in the left lower lobe with peribronchial wall   thickening which may likely inflammatory.  Pulmonary nodules measuring up to 6 mm in the lung parenchyma. According   to Fleischner Society guideline for management of incidental pulmonary   nodules, follow-up CT chest in 6-12 months is recommended.  5 mm polyp versus adherent mucus in upper trachea. Attention to this area   on follow-up imaging is recommended.  Cardiomegaly.  1.7 cm hypoattenuating nodule with internal calcification the right   thyroid lobe. Recommend nonemergent thyroid ultrasound to further   evaluate.          < end of copied text >

## 2025-03-19 NOTE — CONSULT NOTE ADULT - SUBJECTIVE AND OBJECTIVE BOX
Wound SURGERY CONSULT NOTE    HPI:  hx obtained from alt MRN ([id number]) and [facility] documentation, 89F PMH HLD, CKD3, legally blind, b/l SNHL, dementia (minimally verbally responsive baseline per ED), urinary retention, nontoxic goiter, GERD, constipation, anxiety, depression, R femur fx, presents from [facility] reportedly for respiratory distress (hypoxemia to SpO2% 80s by EMS) and decreased responsiveness. Patient nonverbal and unable to participate in interview. Collateral obtained from emergency contact via phone Rhiannon Santiago (family friend for many years) who reports that patient was recently found to be "packing food and medications in the back of her mouth", otherwise confirms aforementioned hx and states that patient has been nonverbal x 1 mo.    HR 80s-90s, BP 140s-170s/60s-80s, RR 18-37, SpO2%  weaned to 3L NC    WBC 15.80 (neutrophil predominant); Na 148, BUN/SCr 37/1.31 (GFR 38), Ca2+ 11 (corrected); HST 64 -> 65, proBNP 2324; VBG 7.33/47/34/25      UA protein, +nitrite, large LE, 616 WBC, many bacteria, mod blood (3 RBC), 17 casts    COV/FluAB/RSV not det     CXR: Question small left pleural effusion and atelectasis.    s/p CTX 1g, LR 500cc    ED GOC discussion yielding DNR/DNI status with trial NIV (placed in orders)   (19 Mar 2025 03:14)        N/V/D,  BM/ Flatus,   NGT,     palp/ sob/dyspnea/ cp,       F/C/S  Wound consult requested by team to assist w/ management of      wound/ pressure injury.   Pt (unable to)  c/o pain, drainage, odor, color change,  or worsening swelling. Offloading and pericare initiated upon admission as pt Increasingly sedentary 2/2 to illness. Pt is Incontinent of urine & stool. (+)johns/ ostomy.   No h/o bites, scratches, falls, trauma.  Pt seen by Wound RN  JAYLEEN Advance/  Aisha,TRIAD/ Aquacell/ medihoney/ Allevyn foam/ dakins/ Adaptic/ DSD recommended used at home/ while awaiting consult.  Appetite good/ decreased.  weight loss.  S&S / RD consult appreciated All questions asked and answered to pt's and family's expressed understanding and satisfaction.    Current Diet: Diet, NPO (03-19-25 @ 04:47)      PAST MEDICAL & SURGICAL HISTORY:  HLD (hyperlipidemia)      Chronic kidney disease, unspecified CKD stage      Dementia      H/O urinary retention      Anxiety and depression      Legally blind      Hard of hearing      H/O fracture of femur      Nontoxic goiter      H/O constipation      GERD (gastroesophageal reflux disease)          REVIEW OF SYSTEMS: Pt unable to offer  General/ Breast/ Skin/Vasc/ Neuro/ MSK: see HPI  All other systems negative    MEDICATIONS  (STANDING):  heparin   Injectable 5000 Unit(s) SubCutaneous every 12 hours  lactated ringers. 500 milliLiter(s) (50 mL/Hr) IV Continuous <Continuous>  piperacillin/tazobactam IVPB.. 3.375 Gram(s) IV Intermittent every 12 hours    MEDICATIONS  (PRN):      Allergies    No Known Allergies    Intolerances        SOCIAL HISTORY:  / /single/ ; (+)HHA/ lives in SNF; Former smoker, No current/ Denies smoking, ETOH, drugs    FAMILY HISTORY:   no h/o PVD or wound healing or skin/ significant problems    PHYSICAL EXAM:  Vital Signs Last 24 Hrs  T(C): 36.9 (19 Mar 2025 16:15), Max: 36.9 (19 Mar 2025 16:15)  T(F): 98.5 (19 Mar 2025 16:15), Max: 98.5 (19 Mar 2025 16:15)  HR: 73 (19 Mar 2025 16:15) (73 - 94)  BP: 126/74 (19 Mar 2025 16:15) (126/74 - 171/69)  BP(mean): --  RR: 18 (19 Mar 2025 16:15) (18 - 37)  SpO2: 96% (19 Mar 2025 16:15) (94% - 100%)    Parameters below as of 19 Mar 2025 16:15  Patient On (Oxygen Delivery Method): nasal cannula  O2 Flow (L/min): 3      NAD, Guarded but stable,  A&Ox3/ Alert/ Confused  cachectic/ thin, MO/ Obese, frail,  WD/ WN/ WG,  Disheveled  Total Care Sport/ Versa Care P500 / Envella Progressa bed     HEENT:  NC/AT, PERRL, EOMI, sclera clear, mucosa moist, throat clear, trachea midline, neck supple, trach  Respiratory: nonlabored w/ equal chest rise  Gastrointestinal: soft NT/ND (+)BS  (+)PEG (+)ostomy (+)NGT  : (+)johns/ purewick/ condom cath  Neurology:  weakened strength & sensation grossly intact, paraesthesia  nonverbal, no follow commands, paraplegic  Psych: calm/ appropriate/ flat affect/ easily agitated/ restless/ anxious/ difficult to assess  Musculoskeletal:  limited stiff / p/FROM, no deformities/ contractures  Vascular: BLE equally warm/ cool,  no cyanosis, clubbing, edema nor acute ischemia           >LE //BLE edema equal           BLE DP/PT pulses palpable          BLE hemosiderin staining/ varicose veins  Skin:  moist w/ good turgor  thin, dry, pale, frail,  ecchymosis w/o hematoma  blistering  or serosanguinous drainage  No odor, erythema, increased warmth, tenderness, induration, fluctuance, nor crepitus    LABS/ CULTURES/ RADIOLOGY:                        11.7   14.22 )-----------( 362      ( 19 Mar 2025 07:23 )             38.8       151  |  112  |  37  ----------------------------<  94      [03-19-25 @ 07:23]  4.2   |  21  |  1.27        Ca     10.5     [03-19-25 @ 07:23]      Mg     2.1     [03-19-25 @ 10:06]      Phos  3.3     [03-19-25 @ 10:06]    TPro  7.0  /  Alb  3.5  /  TBili  0.6  /  DBili  x   /  AST  14  /  ALT  21  /  AlkPhos  108  [03-18-25 @ 19:31]    ACC: [id number] EXAM:  DUPLEX LOW ARTERIES UNI LTD RT   ORDERED BY:    DANITZA SNYDER     PROCEDURE DATE:  03/19/2025          INTERPRETATION:  Clinical information: Diminished pulses right lower   extremity    Technique: Grayscale, color and spectral Doppler imaging was performed at   the arteries of the right lower extremity    Findings:    The right ankle-brachial index equals 0.97.    The right toe-brachial index equals 0.86.    There is an occasionally irregular heart rate.    Mild atheromatous plaque of varying prominence is present along the   course of the artery supplying the right lower extremity.    The right ankle-brachial index equals 0.97.    There is a normal triphasic pattern of unimpeded antegrade flow through   the right external iliac, common femoral, deep femoral, superficial   femoral and popliteal arteries.    There is unimpeded antegrade flow through the right posterior tibial   peroneal trunk, trifurcation arteries and the dorsal pedis artery.    Impression:    The right JUAN of 0.97 is near normal.  The right TBI of 0.87 is normal.    No arterial vascular occlusive or stenotic lesions are identified along   the course of the artery supplying the right lower extremity.    < from: [facility] Duplex Lower Ext Vein Scan, Right (03.19.25 @ 11:59) >    ACC: [id number] EXAM:  DUPLEX EXT VEINS LOWER RT   ORDERED BY:  DANITZA SNYDER     PROCEDURE DATE:  03/19/2025      INTERPRETATION:  CLINICAL INFORMATION: Short of breath, right lower   extremity swelling    COMPARISON: None available.    TECHNIQUE: Duplex sonography of the RIGHT LOWER extremity veins with   color and spectral Doppler, with and without compression.    FINDINGS:    There is normal compressibility of the right common femoral, femoral and   popliteal veins.  The contralateralcommon femoral vein is patent.  Doppler examination shows normal spontaneous and phasic flow.    No calf vein thrombosis is detected.    IMPRESSION:    No evidence of right lower extremity deep venous thrombosis.    < end of copied text >     Wound SURGERY CONSULT NOTE    HPI:  hx obtained from alt MRN ([id number])   89F PMH HLD, CKD3, legally blind, Bilateral SNHL, dementia (minimally verbally responsive baseline per ED), urinary retention, nontoxic goiter, GERD, constipation, anxiety, depression, R femur fx, presents from [facility] reportedly for respiratory distress (hypoxemia to SpO2% 80s by EMS) and decreased responsiveness. Patient nonverbal and unable to participate in interview. Collateral obtained from emergency contact via phone Rhiannon Santiago (family friend for many years) who reports that patient was recently found to be "packing food and medications in the back of her mouth", otherwise confirms aforementioned hx and states that patient has been nonverbal x 1 mo.    HR 80s-90s, BP 140s-170s/60s-80s, RR 18-37, SpO2%  weaned to 3L NC    WBC 15.80 (neutrophil predominant); Na 148, BUN/SCr 37/1.31 (GFR 38), Ca2+ 11 (corrected); HST 64 -> 65, proBNP 2324; VBG 7.33/47/34/25      UA protein, +nitrite, large LE, 616 WBC, many bacteria, mod blood (3 RBC), 17 casts    COV/FluAB/RSV not det     CXR: Question small left pleural effusion and atelectasis.    s/p CTX 1g, LR 500cc    ED GOC discussion yielding DNR/DNI status with trial NIV (placed in orders)    Wound consult requested by team to assist w/ management of wounds noted on admission. Pt unable to c/o pain, drainage, odor, color change, or worsening swelling. Offloading and pericare initiated upon admission as pt Increasingly sedentary 2/2 to illness. Pt is Incontinent of urine & stool. No h/o bites, scratches, falls, trauma.  Appetite poor w/o weight loss. All questions asked and answered to pt's daughter at bed's expressed understanding and satisfaction.    Current Diet: Diet, NPO (03-19-25 @ 04:47)      PAST MEDICAL & SURGICAL HISTORY:  HLD (hyperlipidemia)    Chronic kidney disease, unspecified CKD stage    Dementia    Anxiety and depression    Legally blind    Hard of hearing    Fracture of femur    Nontoxic goiter    constipation    GERD (gastroesophageal reflux disease)      REVIEW OF SYSTEMS: Pt unable to offer      MEDICATIONS  (STANDING):  heparin   Injectable 5000 Unit(s) SubCutaneous every 12 hours  lactated ringers. 500 milliLiter(s) (50 mL/Hr) IV Continuous <Continuous>  piperacillin/tazobactam IVPB.. 3.375 Gram(s) IV Intermittent every 12 hours      No Known Allergies    SOCIAL HISTORY:  / lives in SNF;  No smoking, ETOH, drugs    FAMILY HISTORY: no h/o significant problems    PHYSICAL EXAM:  Vital Signs Last 24 Hrs  T(C): 36.9 (19 Mar 2025 16:15), Max: 36.9 (19 Mar 2025 16:15)  T(F): 98.5 (19 Mar 2025 16:15), Max: 98.5 (19 Mar 2025 16:15)  HR: 73 (19 Mar 2025 16:15) (73 - 94)  BP: 126/74 (19 Mar 2025 16:15) (126/74 - 171/69)  BP(mean): --  RR: 18 (19 Mar 2025 16:15) (18 - 37)  SpO2: 96% (19 Mar 2025 16:15) (94% - 100%)    Parameters below as of 19 Mar 2025 16:15  Patient On (Oxygen Delivery Method): nasal cannula  O2 Flow (L/min): 3      NAD, Guarded but stable,  A&Ox3/ Alert/ Confused  cachectic/ thin, MO/ Obese, frail,  WD/ WN/ WG,  Disheveled  Total Care Sport/ Versa Care P500 / Envella Progressa bed     HEENT:  NC/AT, PERRL, EOMI, sclera clear, mucosa moist, throat clear, trachea midline, neck supple, trach  Respiratory: nonlabored w/ equal chest rise  Gastrointestinal: soft NT/ND (+)BS  (+)PEG (+)ostomy (+)NGT  : (+)johns/ purewick/ condom cath  Neurology:  weakened strength & sensation grossly intact, paraesthesia  nonverbal, no follow commands, paraplegic  Psych: calm/ appropriate/ flat affect/ easily agitated/ restless/ anxious/ difficult to assess  Musculoskeletal:  limited stiff / p/FROM, no deformities/ contractures  Vascular: BLE equally warm/ cool,  no cyanosis, clubbing, edema nor acute ischemia           >LE //BLE edema equal           BLE DP/PT pulses palpable          BLE hemosiderin staining/ varicose veins  Skin:  moist w/ good turgor  thin, dry, pale, frail,  ecchymosis w/o hematoma  blistering  or serosanguinous drainage  No odor, erythema, increased warmth, tenderness, induration, fluctuance, nor crepitus    LABS/ CULTURES/ RADIOLOGY:                        11.7   14.22 )-----------( 362      ( 19 Mar 2025 07:23 )             38.8       151  |  112  |  37  ----------------------------<  94      [03-19-25 @ 07:23]  4.2   |  21  |  1.27        Ca     10.5     [03-19-25 @ 07:23]      Mg     2.1     [03-19-25 @ 10:06]      Phos  3.3     [03-19-25 @ 10:06]    TPro  7.0  /  Alb  3.5  /  TBili  0.6  /  DBili  x   /  AST  14  /  ALT  21  /  AlkPhos  108  [03-18-25 @ 19:31]    ACC: [id number] EXAM:  DUPLEX LOW ARTERIES UNI LTD RT   ORDERED BY:    DANITZA SNYDER     PROCEDURE DATE:  03/19/2025          INTERPRETATION:  Clinical information: Diminished pulses right lower   extremity    Technique: Grayscale, color and spectral Doppler imaging was performed at   the arteries of the right lower extremity    Findings:    The right ankle-brachial index equals 0.97.    The right toe-brachial index equals 0.86.    There is an occasionally irregular heart rate.    Mild atheromatous plaque of varying prominence is present along the   course of the artery supplying the right lower extremity.    The right ankle-brachial index equals 0.97.    There is a normal triphasic pattern of unimpeded antegrade flow through   the right external iliac, common femoral, deep femoral, superficial   femoral and popliteal arteries.    There is unimpeded antegrade flow through the right posterior tibial   peroneal trunk, trifurcation arteries and the dorsal pedis artery.    Impression:    The right JUAN of 0.97 is near normal.  The right TBI of 0.87 is normal.    No arterial vascular occlusive or stenotic lesions are identified along   the course of the artery supplying the right lower extremity.    < from: [facility] Duplex Lower Ext Vein Scan, Right (03.19.25 @ 11:59) >    ACC: [id number] EXAM:  DUPLEX EXT VEINS LOWER RT   ORDERED BY:  DANITZA SNYDER     PROCEDURE DATE:  03/19/2025      INTERPRETATION:  CLINICAL INFORMATION: Short of breath, right lower   extremity swelling    COMPARISON: None available.    TECHNIQUE: Duplex sonography of the RIGHT LOWER extremity veins with   color and spectral Doppler, with and without compression.    FINDINGS:    There is normal compressibility of the right common femoral, femoral and   popliteal veins.  The contralateralcommon femoral vein is patent.  Doppler examination shows normal spontaneous and phasic flow.    No calf vein thrombosis is detected.    IMPRESSION:    No evidence of right lower extremity deep venous thrombosis.    < end of copied text >     Wound SURGERY CONSULT NOTE    HPI:  hx obtained from alt MRN ([id number])   89F PMH HLD, CKD3, legally blind, Bilateral SNHL, dementia (minimally verbally responsive baseline per ED), urinary retention, nontoxic goiter, GERD, constipation, anxiety, depression, R femur fx, presents from [facility] reportedly for respiratory distress (hypoxemia to SpO2% 80s by EMS) and decreased responsiveness. Patient nonverbal and unable to participate in interview. Collateral obtained from emergency contact via phone Rhiannon Santiago (family friend for many years) who reports that patient was recently found to be "packing food and medications in the back of her mouth", otherwise confirms aforementioned hx and states that patient has been nonverbal x 1 mo.    HR 80s-90s, BP 140s-170s/60s-80s, RR 18-37, SpO2%  weaned to 3L NC    WBC 15.80 (neutrophil predominant); Na 148, BUN/SCr 37/1.31 (GFR 38), Ca2+ 11 (corrected); HST 64 -> 65, proBNP 2324; VBG 7.33/47/34/25      UA protein, +nitrite, large LE, 616 WBC, many bacteria, mod blood (3 RBC), 17 casts    COV/FluAB/RSV not det     CXR: Question small left pleural effusion and atelectasis.    s/p CTX 1g, LR 500cc    ED GOC discussion yielding DNR/DNI status with trial NIV (placed in orders)    Wound consult requested by team to assist w/ management of wounds noted on admission. Pt unable to c/o pain, drainage, odor, color change, or worsening swelling. Offloading and pericare initiated upon admission as pt Increasingly sedentary 2/2 to illness. Pt is Incontinent of urine & stool. No h/o bites, scratches, falls, trauma.  Appetite poor w/o weight loss. All questions asked and answered to pt's daughter at bed's expressed understanding and satisfaction.    Current Diet: Diet, NPO (03-19-25 @ 04:47)      PAST MEDICAL & SURGICAL HISTORY:  HLD (hyperlipidemia)    Chronic kidney disease, unspecified CKD stage    Dementia    Anxiety and depression    Legally blind    Hard of hearing    Fracture of femur    Nontoxic goiter    constipation    GERD (gastroesophageal reflux disease)      REVIEW OF SYSTEMS: Pt unable to offer      MEDICATIONS  (STANDING):  heparin   Injectable 5000 Unit(s) SubCutaneous every 12 hours  lactated ringers. 500 milliLiter(s) (50 mL/Hr) IV Continuous <Continuous>  piperacillin/tazobactam IVPB.. 3.375 Gram(s) IV Intermittent every 12 hours      No Known Allergies    SOCIAL HISTORY:  / lives in SNF;  No smoking, ETOH, drugs    FAMILY HISTORY: no h/o significant problems    PHYSICAL EXAM:  Vital Signs Last 24 Hrs  T(C): 36.9 (19 Mar 2025 16:15), Max: 36.9 (19 Mar 2025 16:15)  T(F): 98.5 (19 Mar 2025 16:15), Max: 98.5 (19 Mar 2025 16:15)  HR: 73 (19 Mar 2025 16:15) (73 - 94)  BP: 126/74 (19 Mar 2025 16:15) (126/74 - 171/69)  BP(mean): --  RR: 18 (19 Mar 2025 16:15) (18 - 37)  SpO2: 96% (19 Mar 2025 16:15) (94% - 100%)    Parameters below as of 19 Mar 2025 16:15  Patient On (Oxygen Delivery Method): nasal cannula  O2 Flow (L/min): 3      NAD, lethargic, Obese, frail, WD/ WN/ WG  Versa Care P500 bed  HEENT:  NC/AT, EOMI, sclera clear, mucosa moist, throat clear, trachea midline, neck supple  Respiratory: nonlabored w/ equal chest rise  Gastrointestinal: soft NT/ND   Neurology:  nonverbal, no follow commands  Psych: calm/ appropriate  Musculoskeletal: pROM, no deformities/ contractures  Vascular: BLE equally warm/ cool,  no cyanosis, clubbing, edema nor acute ischemia        R >LLE edema equal        no BLE DP/PT pulses palpable    Rt heel purple dti w/ purple skin changes 3cm x 2.5cm     w/o blistering or drainage  No odor, erythema, increased warmth, tenderness, induration, fluctuance, nor crepitus  Skin:  thin, dry, pale, frail,  ecchymosis w/o hematoma  Bilateral buttocks w/ hyperpigmented intact skin    w/o blistering or drainage  No odor, erythema, increased warmth, tenderness, induration, fluctuance, nor crepitus    LABS/ CULTURES/ RADIOLOGY:                        11.7   14.22 )-----------( 362      ( 19 Mar 2025 07:23 )             38.8       151  |  112  |  37  ----------------------------<  94      [03-19-25 @ 07:23]  4.2   |  21  |  1.27        Ca     10.5     [03-19-25 @ 07:23]      Mg     2.1     [03-19-25 @ 10:06]      Phos  3.3     [03-19-25 @ 10:06]    TPro  7.0  /  Alb  3.5  /  TBili  0.6  /  DBili  x   /  AST  14  /  ALT  21  /  AlkPhos  108  [03-18-25 @ 19:31]    ACC: [id number] EXAM:  DUPLEX LOW ARTERIES UNI LTD RT   ORDERED BY:    DANITZA SNYDER     PROCEDURE DATE:  03/19/2025      INTERPRETATION:  Clinical information: Diminished pulses right lower   extremity    Technique: Grayscale, color and spectral Doppler imaging was performed at   the arteries of the right lower extremity    Findings:    The right ankle-brachial index equals 0.97.    The right toe-brachial index equals 0.86.    There is an occasionally irregular heart rate.    Mild atheromatous plaque of varying prominence is present along the   course of the artery supplying the right lower extremity.    The right ankle-brachial index equals 0.97.    There is a normal triphasic pattern of unimpeded antegrade flow through   the right external iliac, common femoral, deep femoral, superficial   femoral and popliteal arteries.    There is unimpeded antegrade flow through the right posterior tibial   peroneal trunk, trifurcation arteries and the dorsal pedis artery.    Impression:    The right JUAN of 0.97 is near normal.  The right TBI of 0.87 is normal.    No arterial vascular occlusive or stenotic lesions are identified along   the course of the artery supplying the right lower extremity.    < from: [facility] Duplex Lower Ext Vein Scan, Right (03.19.25 @ 11:59) >    ACC: [id number] EXAM:  DUPLEX EXT VEINS LOWER RT   ORDERED BY:  DANITZA SNYDER     PROCEDURE DATE:  03/19/2025      INTERPRETATION:  CLINICAL INFORMATION: Short of breath, right lower   extremity swelling    COMPARISON: None available.    TECHNIQUE: Duplex sonography of the RIGHT LOWER extremity veins with   color and spectral Doppler, with and without compression.    FINDINGS:    There is normal compressibility of the right common femoral, femoral and   popliteal veins.  The contralateralcommon femoral vein is patent.  Doppler examination shows normal spontaneous and phasic flow.    No calf vein thrombosis is detected.    IMPRESSION:    No evidence of right lower extremity deep venous thrombosis.    < end of copied text >

## 2025-03-19 NOTE — H&P ADULT - ASSESSMENT
89F PMH HLD, CKD3, legally blind, b/l SNHL, dementia (minimally verbally responsive baseline per ED), urinary retention, nontoxic goiter, GERD, constipation, anxiety, depression, R femur fx, presents from [facility] reportedly for respiratory distress (hypoxemia to SpO2% 80s by EMS) and decreased responsiveness a/w sepsis 2/2 PNA/UTI c/f aspiration

## 2025-03-19 NOTE — SWALLOW BEDSIDE ASSESSMENT ADULT - COMMENTS
CT Chest 3/19: IMPRESSION: Mild bronchiectasis in the left lower lobe with peribronchial wall thickening which may likely inflammatory. Pulmonary nodules measuring up to 6 mm in the lung parenchyma. According to Fleischner Society guideline for management of incidental pulmonary nodules, follow-up CT chest in 6-12 months is recommended. 5 mm polyp versus adherent mucus in upper trachea. Attention to this area on follow-up imaging is recommended. Cardiomegaly. 1.7 cm hypoattenuating nodule with internal calcification the right thyroid lobe. Recommend nonemergent thyroid ultrasound to further evaluate.    Speech/swallow hx: Pt is new to this service Bolus suctioned from oral cavity

## 2025-03-19 NOTE — H&P ADULT - HISTORY OF PRESENT ILLNESS
hx obtained from alt MRN ([id number]) and ED documentation, 89F PMH HLD, CKD, legally blind, [place], dementia (minimally verbally responsive baseline per ED), urinary retention, anxiety, depression, presents from [facility] for respiratory distress (hypoxemia to SpO2% 80s by EMS) and decreased responsiveness.     HR 80s-90s, BP 140s-170s/60s-80s, RR 18-37, SpO2%  weaned to 3L NC    WBC 15.80 (neutrophil predominant); Na 148, BUN/SCr 37/1.31 (GFR 38), Ca2+ 11 (corrected); HST 64 -> 65, proBNP 2324; VBG 7.33/47/34/25      UA protein, +nitrite, large LE, 616 WBC, many bacteria, mod blood (3 RBC), 17 casts    CXR: Question small left pleural effusion and atelectasis.    s/p CTX 1g, LR 500cc    ED GOC discussion yielding DNR/DNI status with trial NIV (placed in orders)   hx obtained from alt [facility] ([id number]) and [facility] documentation, 89F PMH HLD, CKD3, legally blind, b/l SNHL, dementia (minimally verbally responsive baseline per ED), urinary retention, nontoxic goiter, GERD, constipation, anxiety, depression, R femur fx, presents from [facility] reportedly for respiratory distress (hypoxemia to SpO2% 80s by EMS) and decreased responsiveness.     HR 80s-90s, BP 140s-170s/60s-80s, RR 18-37, SpO2%  weaned to 3L NC    WBC 15.80 (neutrophil predominant); Na 148, BUN/SCr 37/1.31 (GFR 38), Ca2+ 11 (corrected); HST 64 -> 65, proBNP 2324; VBG 7.33/47/34/25      UA protein, +nitrite, large LE, 616 WBC, many bacteria, mod blood (3 RBC), 17 casts    CXR: Question small left pleural effusion and atelectasis.    s/p CTX 1g, LR 500cc    ED GOC discussion yielding DNR/DNI status with trial NIV (placed in orders)   hx obtained from alt MRN ([id number]) and [facility] documentation, 89F PMH HLD, CKD3, legally blind, b/l SNHL, dementia (minimally verbally responsive baseline per ED), urinary retention, nontoxic goiter, GERD, constipation, anxiety, depression, R femur fx, presents from [facility] reportedly for respiratory distress (hypoxemia to SpO2% 80s by EMS) and decreased responsiveness.     HR 80s-90s, BP 140s-170s/60s-80s, RR 18-37, SpO2%  weaned to 3L NC    WBC 15.80 (neutrophil predominant); Na 148, BUN/SCr 37/1.31 (GFR 38), Ca2+ 11 (corrected); HST 64 -> 65, proBNP 2324; VBG 7.33/47/34/25      UA protein, +nitrite, large LE, 616 WBC, many bacteria, mod blood (3 RBC), 17 casts    COV/FluAB/RSV not det     CXR: Question small left pleural effusion and atelectasis.    s/p CTX 1g, LR 500cc    ED GOC discussion yielding DNR/DNI status with trial NIV (placed in orders)   hx obtained from alt MRN ([id number]) and senior living documentation, 89F PMH HLD, CKD3, legally blind, b/l SNHL, dementia (minimally verbally responsive baseline per ED), urinary retention, nontoxic goiter, GERD, constipation, anxiety, depression, R femur fx, presents from [facility] senior [facility] reportedly for respiratory distress (hypoxemia to SpO2% 80s by EMS) and decreased responsiveness. Patient nonverbal and unable to participate in interview. Collateral obtained from emergency contact via phone Rhiannon Santiago (family friend for many years) who reports that patient was recently found to be "packing food and medications in the back of her mouth", otherwise confirms aforementioned hx and states that patient has been nonverbal x 1 mo.    HR 80s-90s, BP 140s-170s/60s-80s, RR 18-37, SpO2%  weaned to 3L NC    WBC 15.80 (neutrophil predominant); Na 148, BUN/SCr 37/1.31 (GFR 38), Ca2+ 11 (corrected); HST 64 -> 65, proBNP 2324; VBG 7.33/47/34/25      UA protein, +nitrite, large LE, 616 WBC, many bacteria, mod blood (3 RBC), 17 casts    COV/FluAB/RSV not det     CXR: Question small left pleural effusion and atelectasis.    s/p CTX 1g, LR 500cc    ED GOC discussion yielding DNR/DNI status with trial NIV (placed in orders)

## 2025-03-19 NOTE — H&P ADULT - PROBLEM SELECTOR PLAN 10
- baseline nonverbal per emergency contact  - will need to hold home PO meds given c/f aspiration pending formal S&S to restart when safe  - fall/aspiration precautions

## 2025-03-19 NOTE — H&P ADULT - PROBLEM SELECTOR PROBLEM 8
Reviewed discharge instructions, follow-up care and warning signs with Iris, significant other and family.    Depo-Provera IM given.       Stage 3 chronic kidney disease

## 2025-03-19 NOTE — H&P ADULT - NSHPADDITIONALINFOADULT_GEN_ALL_CORE
Urgent medical problems addressed overnight, comprehensive care to be assumed by day colleagues during course of admission.    Please refer to detailed radiology reports above, provide to patient upon discharge for presentation to PCP at which time abnormal findings not addressed inpatient can be followed up.    Case assigned to me overnight by hospitalist in charge Dr. Sprague, care to be assumed in AM by primary team Dr. James

## 2025-03-19 NOTE — H&P ADULT - NSHPLABSRESULTS_GEN_ALL_CORE
EKG NSR 90BPM, incomplete RBBB (QRS 116ms), QTc prolonged 474ms, TWI III, aVF (RBBB present 4/22/24, TWIs new)

## 2025-03-19 NOTE — H&P ADULT - PROBLEM/PLAN-8

## 2025-03-19 NOTE — H&P ADULT - PROBLEM SELECTOR PLAN 1
SIRS+ (HR, RR, WBC) with reported packing of food in back of mouth, though CXR w/o consolidation, would be c/f aspiration PNA as source. Additionally +UA unable to verify if sx. S/p CTX in ED  - dose zosyn pending Cx (hold azithro given QTc prolongation), MRSA/legionella/strep  - obtain CT chest NC to further evaluate   - NPO pending formal S&S  - aspiration precautions

## 2025-03-19 NOTE — H&P ADULT - PROBLEM SELECTOR PLAN 9
- R forehead firm, circumscribed, immobile mass nontender to palpation w/o report of fall from emergency contact/ED. Would contact PCP in AM to clarify if baseline

## 2025-03-19 NOTE — SWALLOW BEDSIDE ASSESSMENT ADULT - SWALLOW EVAL: DIAGNOSIS
89 y/o female with dementia a/w sepsis 2/2 PNA/UTI c/f aspiration. Pt presents with insufficient mentation to support safe PO intake. Nonverbal, not following commands, requiring cues to sustain alertness. With minimal trials of ice chips/ puree, there is reduced oral aperture, reduced oral grading, minimal oral manipulation, and single, palpable pharyngeal swallow trigger with maximal cueing; however, swallow trigger not consistent. Moderate-severe amount of oral residue with anterior loss of bolus mixed with saliva - requiring suctioning via [place]. Oral diet not currently safe or functional.

## 2025-03-19 NOTE — H&P ADULT - NSICDXPASTMEDICALHX_GEN_ALL_CORE_FT
PAST MEDICAL HISTORY:  Anxiety and depression     Chronic kidney disease, unspecified CKD stage     Dementia     H/O urinary retention     HLD (hyperlipidemia)      PAST MEDICAL HISTORY:  Anxiety and depression     Chronic kidney disease, unspecified CKD stage     Dementia     GERD (gastroesophageal reflux disease)     H/O constipation     H/O fracture of femur     H/O urinary retention     Hard of hearing     HLD (hyperlipidemia)     Legally blind     Nontoxic goiter

## 2025-03-19 NOTE — SWALLOW BEDSIDE ASSESSMENT ADULT - ORAL PHASE
No attempts at bolus transport/Decreased anterior-posterior movement of the bolus/Stasis in anterior sulcus/Stasis in lateral sulci Decreased anterior-posterior movement of the bolus/Delayed oral transit time/Stasis in anterior sulcus/Stasis in lateral sulci

## 2025-03-19 NOTE — SWALLOW BEDSIDE ASSESSMENT ADULT - SLP GENERAL OBSERVATIONS
Pt encountered in bed, asleep, on room air. Rousable to tactile stimuli/ repositioning, requires cues to sustain alertness. Mild-moderate amount of ?secretions vs PO suctioned from L-lateral sulci.

## 2025-03-19 NOTE — H&P ADULT - NSHPPHYSICALEXAM_GEN_ALL_CORE
PHYSICAL EXAM:  GENERAL: Elderly-appearing in NAD, well-groomed, well-developed, unable to meaningfully participate in interview   HEAD: Atraumatic, +R forehead w/ firm circumscribed, immobile mass nontender to palpation   EYES: PERRL, conjunctiva and sclera clear (difficult to examine as pt shutting eyes against examiner)   ENMT: Visualized MMM otherwise difficult to appreciate oropharynx d/t participation   NECK: Supple w/o JVD  NERVOUS SYSTEM: Unable to assess orientation as patient nonverbal, awakens to tactile stimuli, No facial asymmetry. Moving all extremities minimally, otherwise sensorimotor exam difficult d/t participation   CHEST/LUNG: Clear to auscultation bilaterally; No rales, rhonchi, wheezing, or rubs  HEART: Regular rate and rhythm; No murmurs, rubs, or gallops  ABDOMEN: Soft, Nontender, Nondistended; Bowel sounds present. No guarding, rebound tenderness, or rigidity. No CVA tenderness   EXTREMITIES: 2+ Peripheral Pulses (though L foot > R foot), No warmth/tenderness/erythema to palpation b/l LE. +RLE 1-2+ pitting edema. R heel hyperpigmentation c/f DTI

## 2025-03-19 NOTE — H&P ADULT - PROBLEM SELECTOR PLAN 4
- presume i/s/o infxn above superimposed on baseline dementia, ctm and if not improving w/ tx would consider further eval

## 2025-03-20 ENCOUNTER — RESULT REVIEW (OUTPATIENT)
Age: 89
End: 2025-03-20

## 2025-03-20 NOTE — PROGRESS NOTE ADULT - ASSESSMENT
Assessment/plan:    Right heel DTI: Stable, noninfected, present on admission    Recommend continue decubitus precautions and use of Z flow boots.  Recommend Betadine paint and Allevyn foam dressing to right heel daily.  Will continue to monitor for signs of infection and wound care recommendations

## 2025-03-20 NOTE — CONSULT NOTE ADULT - ASSESSMENT
89 y/o F with PMH of HLD, CKD3, legally blind, b/l SNHL, dementia (minimally verbally responsive baseline per ED), urinary retention, nontoxic goiter, GERD, constipation, anxiety, depression, R femur fx, presents from [facility] reportedly for respiratory distress (hypoxemia to SpO2% 80s by EMS) and decreased responsiveness. Patient nonverbal and unable to participate in interview. Collateral obtained from emergency contact via phone Rhiannon Jack (family friend for many years) who reports that patient was recently found to be "packing food and medications in the back of her mouth", otherwise confirms aforementioned hx and states that patient has been nonverbal x 1 mo. Labs on admission notable for leukocytosis, elevated proBNP, UA+. CT chest with LLL bronchiectasis with peribronchial wall thickening, scattered pulmonary nodules. Pulmonary called to consult for hypoxia, r/o PNA.   89 y/o F with PMH of HLD, CKD3, legally blind, b/l SNHL, dementia (minimally verbally responsive baseline per ED), urinary retention, nontoxic goiter, GERD, constipation, anxiety, depression, R femur fx, presents from [facility] reportedly for respiratory distress (hypoxemia to SpO2% 80s by EMS) and decreased responsiveness. Patient nonverbal and unable to participate in interview. Collateral obtained from emergency contact via phone Rhiannon Santiago (family friend for many years) who reports that patient was recently found to be "packing food and medications in the back of her mouth", otherwise confirms aforementioned hx and states that patient has been nonverbal x 1 mo. Labs on admission notable for leukocytosis, elevated proBNP, UA+. Pulmonary called to consult for hypoxia, r/o PNA.

## 2025-03-20 NOTE — PROVIDER CONTACT NOTE (CRITICAL VALUE NOTIFICATION) - SITUATION
blood culture collected on 3/18 shows growth in aerobic bottle gram positive cocci clusters
pt with am labs  lactate 3.6

## 2025-03-20 NOTE — PROVIDER CONTACT NOTE (CRITICAL VALUE NOTIFICATION) - ASSESSMENT
pt lying in bed in nAD
pt nonverbal, in no acute distress, on 3L 02 NC, non verbal indicators of pain not present, VSS, pt resting comfortably in bed

## 2025-03-20 NOTE — CONSULT NOTE ADULT - SUBJECTIVE AND OBJECTIVE BOX
[facility]  [phone]  NEPHROLOGY      INITIAL CONSULT NOTE  --------------------------------------------------------------------------------  HPI:     89F PMH HLD, CKD3, legally blind, b/l SNHL, dementia (minimally verbally responsive baseline per ED), urinary retention, nontoxic goiter, GERD, constipation, anxiety, depression, R femur fx, presents from [facility] reportedly for respiratory distress (hypoxemia to SpO2% 80s by EMS) and decreased responsiveness. Patient nonverbal and unable to participate in interview. patient has been nonverbal x 1 mo. noticed with hypernatremia given has been npo. na elevated renal consult called.       PAST HISTORY  --------------------------------------------------------------------------------  PAST MEDICAL & SURGICAL HISTORY:  HLD (hyperlipidemia)      Chronic kidney disease, unspecified CKD stage      Dementia      H/O urinary retention      Anxiety and depression      Legally blind      Hard of hearing      H/O fracture of femur      Nontoxic goiter      H/O constipation      GERD (gastroesophageal reflux disease)        FAMILY HISTORY:    PAST SOCIAL HISTORY:    ALLERGIES & MEDICATIONS  --------------------------------------------------------------------------------  Allergies    No Known Allergies    Intolerances      Standing Inpatient Medications  albuterol/ipratropium for Nebulization 3 milliLiter(s) Nebulizer every 6 hours  dextrose 5%. 1000 milliLiter(s) IV Continuous <Continuous>  heparin   Injectable 5000 Unit(s) SubCutaneous every 12 hours  piperacillin/tazobactam IVPB.. 3.375 Gram(s) IV Intermittent every 12 hours    PRN Inpatient Medications      REVIEW OF SYSTEMS  --------------------------------------------------------------------------------  non verbal     VITALS/PHYSICAL EXAM  --------------------------------------------------------------------------------  T(C): 37.2 (03-20-25 @ 20:25), Max: 37.4 (03-20-25 @ 04:41)  HR: 72 (03-20-25 @ 20:25) (69 - 92)  BP: 163/68 (03-20-25 @ 20:25) (131/79 - 163/68)  RR: 18 (03-20-25 @ 20:25) (18 - 18)  SpO2: 97% (03-20-25 @ 20:25) (94% - 97%)  Wt(kg): --        03-19-25 @ 07:01  -  03-20-25 @ 07:00  --------------------------------------------------------  IN: 1500 mL / OUT: 0 mL / NET: 1500 mL    03-20-25 @ 07:01  -  03-20-25 @ 22:35  --------------------------------------------------------  IN: 730 mL / OUT: 0 mL / NET: 730 mL      Physical Exam:  	Gen: comfortable appearing  frail elderly F poor dental hygiene   	no jvd  	Pulm: decrease bs  no rales or ronchi or wheezing  	CV: RRR, S1S2; no rub  	Abd: +BS, soft, nontender/nondistended  	: No bladder distention   	UE: Warm, no cyanosis  no clubbing,  no edema;  	LE: Warm, no cyanosis  no clubbing, no edema  	Neuro: non verbal     LABS/STUDIES  --------------------------------------------------------------------------------              9.7    12.41 >-----------<  318      [03-20-25 @ 07:12]              32.3     155  |  121  |  30  ----------------------------<  124      [03-20-25 @ 07:15]  3.4   |  23  |  1.45        Ca     9.4     [03-20-25 @ 07:15]      Mg     2.1     [03-19-25 @ 10:06]      Phos  3.3     [03-19-25 @ 10:06]            Creatinine Trend:  SCr 1.45 [03-20 @ 07:15]  SCr 1.27 [03-19 @ 07:23]  SCr 1.31 [03-18 @ 19:31]        Urinalysis (03.18.25 @ 21:17)   Glucose Qualitative, Urine: Negative mg/dL  Blood, Urine: Moderate  pH Urine: 5.5  Color: Yellow  Urine Appearance: Turbid  Bilirubin: Negative  Ketone - Urine: Negative mg/dL  Specific Gravity: 1.019  Protein, Urine: 30 mg/dL  Urobilinogen: 0.2 mg/dL  Nitrite: Positive  Leukocyte Esterase Concentration: Large    PTH -- (Ca 10.0)      [03-19-25 @ 10:06]   35  TSH 0.26      [03-20-25 @ 07:10]

## 2025-03-20 NOTE — PROGRESS NOTE ADULT - SUBJECTIVE AND OBJECTIVE BOX
Patient is a 89y old  Female who presents with a chief complaint of AHRF, sepsis presume 2/2 PNA, UTI (20 Mar 2025 09:25)      SUBJECTIVE / OVERNIGHT EVENTS: lethargic  Review of Systems  unobtainable     MEDICATIONS  (STANDING):  albuterol/ipratropium for Nebulization 3 milliLiter(s) Nebulizer every 6 hours  dextrose 5% + sodium chloride 0.9%. 1000 milliLiter(s) (50 mL/Hr) IV Continuous <Continuous>  heparin   Injectable 5000 Unit(s) SubCutaneous every 12 hours  piperacillin/tazobactam IVPB.. 3.375 Gram(s) IV Intermittent every 12 hours    MEDICATIONS  (PRN):      Vital Signs Last 24 Hrs  T(C): 36.7 (20 Mar 2025 16:25), Max: 37.4 (20 Mar 2025 04:41)  T(F): 98.1 (20 Mar 2025 16:25), Max: 99.3 (20 Mar 2025 04:41)  HR: 79 (20 Mar 2025 16:25) (69 - 92)  BP: 156/68 (20 Mar 2025 16:25) (126/65 - 156/68)  BP(mean): --  RR: 18 (20 Mar 2025 16:25) (18 - 18)  SpO2: 94% (20 Mar 2025 16:25) (94% - 97%)    Parameters below as of 20 Mar 2025 16:25  Patient On (Oxygen Delivery Method): nasal cannula  O2 Flow (L/min): 3      PHYSICAL EXAM:  GENERAL: NAD  HEAD:  Atraumatic, Normocephalic  EYES: EOMI, PERRLA, conjunctiva and sclera clear  NECK: Supple, No JVD  CHEST/LUNG: Clear to auscultation bilaterally; No wheeze  HEART: Regular rate and rhythm; No murmurs, rubs, or gallops  ABDOMEN: Soft, Nontender, Nondistended; Bowel sounds present  EXTREMITIES:  Heel wound clean   PSYCH: AAOx0  NEUROLOGY: non-focal  SKIN: No rashes or lesions    LABS:                        9.7    12.41 )-----------( 318      ( 20 Mar 2025 07:12 )             32.3     03-20    155[H]  |  121[H]  |  30[H]  ----------------------------<  124[H]  3.4[L]   |  23  |  1.45[H]    Ca    9.4      20 Mar 2025 07:15  Phos  3.3     03-19  Mg     2.1     03-19    TPro  7.0  /  Alb  3.5  /  TBili  0.6  /  DBili  x   /  AST  14  /  ALT  21  /  AlkPhos  108  03-18          Urinalysis Basic - ( 20 Mar 2025 07:15 )    Color: x / Appearance: x / SG: x / pH: x  Gluc: 124 mg/dL / Ketone: x  / Bili: x / Urobili: x   Blood: x / Protein: x / Nitrite: x   Leuk Esterase: x / RBC: x / WBC x   Sq Epi: x / Non Sq Epi: x / Bacteria: x        Culture - Urine (collected 18 Mar 2025 21:17)  Source: Clean Catch Clean Catch (Midstream)  Preliminary Report (19 Mar 2025 22:05):    >100,000 CFU/ml Escherichia coli    Culture - Blood (collected 18 Mar 2025 20:50)  Source: Blood Blood  Preliminary Report (20 Mar 2025 02:03):    No growth at 24 hours    Culture - Blood (collected 18 Mar 2025 20:35)  Source: Blood Blood  Gram Stain (19 Mar 2025 22:16):    Growth in aerobic bottle: Gram Positive Cocci in Clusters  Final Report (20 Mar 2025 14:55):    Growth in aerobic bottle: Staphylococcus epidermidis    Isolation of Coagulase negative Staphylococcus from single blood culture    sets may represent    contamination. Contact the Microbiology Department at [phone] if    susceptibility testing is needed.    clinically indicated.    Direct identification is available within approximately 3-5    hours either by Blood Panel Multiplexed PCR or Direct    MALDI-TOF. Details: [email or web address]  Organism: Blood Culture PCR (20 Mar 2025 14:55)  Organism: Blood Culture PCR (20 Mar 2025 14:55)    < from: TTE W or WO Ultrasound Enhancing Agent (03.20.25 @ 11:06) >     CONCLUSIONS:      1. Technically difficult image quality.   2. The left ventricular cavity is small. Abnormal (paradoxical) septal motion consistent with conduction delay. Left ventricular endocardium is not well visualized; however, the left ventricular systolic function appears grossly hyperdynamic with an ejection fraction visually estimated at 70 to 75%.   3. There is moderate (grade 2) left ventricular diastolic dysfunction, with elevated left ventricular filling pressure.   4. Normal right ventricular cavity size and mildly reduced right ventricular systolic function.   5. Mild to moderate tricuspid regurgitation.   6. Estimated pulmonary artery systolic pressure is 71 mmHg, consistent with severe pulmonary hypertension.   7. No pericardial effusion seen.   8. Aortic root at the sinuses of Valsalva is dilated, measuring 3.90 cm (indexed 2.81 cm/m²).   9. No prior echocardiogram is available for comparison.    < end of copied text >      RADIOLOGY & ADDITIONAL TESTS:    Imaging Personally Reviewed:    Consultant(s) Notes Reviewed:      Care Discussed with Consultants/Other Providers:

## 2025-03-20 NOTE — PHARMACOTHERAPY INTERVENTION NOTE - COMMENTS
Reason for consult: Provider request:     Recommended to hold off on adding on any additional abx to the current regimen in the setting of Stap epi bacteremia (1/4 bottles), likely a containment.     Lyric Prasad, PharmD  Clinical Pharmacy Specialist, Infectious Diseases  Tele-Antimicrobial Stewardship Program (Tele-ASP)  Tele-ASP Phone: [phone]

## 2025-03-20 NOTE — CONSULT NOTE ADULT - SUBJECTIVE AND OBJECTIVE BOX
PULMONARY CONSULT    HPI: 89 y/o F with PMH of HLD, CKD3, legally blind, b/l SNHL, dementia (minimally verbally responsive baseline per ED), urinary retention, nontoxic goiter, GERD, constipation, anxiety, depression, R femur fx, presents from [facility] reportedly for respiratory distress (hypoxemia to SpO2% 80s by EMS) and decreased responsiveness. Patient nonverbal and unable to participate in interview. Collateral obtained from emergency contact via phone Rhiannon Santiago (family friend for many years) who reports that patient was recently found to be "packing food and medications in the back of her mouth", otherwise confirms aforementioned hx and states that patient has been nonverbal x 1 mo. Labs on admission notable for leukocytosis, elevated proBNP, UA+. CT chest with LLL bronchiectasis with peribronchial wall thickening, scattered pulmonary nodules. Pulmonary called to consult for hypoxia, r/o PNA.      PAST MEDICAL & SURGICAL HISTORY:  HLD (hyperlipidemia)  Chronic kidney disease, unspecified CKD stage  Dementia  H/O urinary retention  Anxiety and depression  Legally blind  Hard of hearing  H/O fracture of femur  Nontoxic goiter  H/O constipation  GERD (gastroesophageal reflux disease)    No Known Allergies    FAMILY HISTORY:    Social history:     Review of Systems:  CONSTITUTIONAL: No fever, chills, or fatigue  EYES: No eye pain, visual disturbances, or discharge  ENMT:  No difficulty hearing, tinnitus, vertigo; No sinus or throat pain  NECK: No pain or stiffness  RESPIRATORY: Per above  CARDIOVASCULAR: No chest pain, palpitations, dizziness, or leg swelling  GASTROINTESTINAL: No abdominal or epigastric pain. No nausea, vomiting, or hematemesis; No diarrhea or constipation. No melena or hematochezia.  GENITOURINARY: No dysuria, frequency, hematuria, or incontinence  NEUROLOGICAL: No headaches, memory loss, loss of strength, numbness, or tremors  SKIN: No itching, burning, rashes, or lesions   MUSCULOSKELETAL: No joint pain or swelling; No muscle, back, or extremity pain  PSYCHIATRIC: No depression, anxiety, mood swings, or difficulty sleeping      Medications:  MEDICATIONS  (STANDING):  dextrose 5% + sodium chloride 0.9%. 1000 milliLiter(s) (50 mL/Hr) IV Continuous <Continuous>  heparin   Injectable 5000 Unit(s) SubCutaneous every 12 hours  piperacillin/tazobactam IVPB.. 3.375 Gram(s) IV Intermittent every 12 hours      Vital Signs Last 24 Hrs  T(C): 36.9 (20 Mar 2025 07:40), Max: 37.4 (20 Mar 2025 04:41)  T(F): 98.4 (20 Mar 2025 07:40), Max: 99.3 (20 Mar 2025 04:41)  HR: 69 (20 Mar 2025 07:40) (69 - 92)  BP: 131/79 (20 Mar 2025 07:40) (126/65 - 149/79)  BP(mean): --  RR: 18 (20 Mar 2025 07:40) (18 - 20)  SpO2: 97% (20 Mar 2025 07:40) (94% - 98%)    Parameters below as of 20 Mar 2025 07:40  Patient On (Oxygen Delivery Method): nasal cannula  O2 Flow (L/min): 3        VBG pH 7.34 03-19 @ 09:58  VBG pCO2 45 03-19 @ 09:58  VBG O2 sat 28.3 03-19 @ 09:58  VBG lactate 3.6 03-19 @ 09:58  VBG pH 7.33 03-18 @ 19:25  VBG pCO2 47 03-18 @ 19:25  VBG O2 sat 46.6 03-18 @ 19:25  VBG lactate 1.9 03-18 @ 19:25        03-19 @ 07:01  -  03-20 @ 07:00  --------------------------------------------------------  IN: 1500 mL / OUT: 0 mL / NET: 1500 mL          LABS:                        9.7    12.41 )-----------( 318      ( 20 Mar 2025 07:12 )             32.3     03-20    155[H]  |  121[H]  |  30[H]  ----------------------------<  124[H]  3.4[L]   |  23  |  1.45[H]    Ca    9.4      20 Mar 2025 07:15  Phos  3.3     03-19  Mg     2.1     03-19    TPro  7.0  /  Alb  3.5  /  TBili  0.6  /  DBili  x   /  AST  14  /  ALT  21  /  AlkPhos  108  03-18          CAPILLARY BLOOD GLUCOSE          Urinalysis Basic - ( 20 Mar 2025 07:15 )    Color: x / Appearance: x / SG: x / pH: x  Gluc: 124 mg/dL / Ketone: x  / Bili: x / Urobili: x   Blood: x / Protein: x / Nitrite: x   Leuk Esterase: x / RBC: x / WBC x   Sq Epi: x / Non Sq Epi: x / Bacteria: x                    CULTURES: (if applicable)     (collected 03-18-25 @ 20:50)  Source: Blood Blood  Preliminary Report (03-20-25 @ 02:03):    No growth at 24 hours     (collected 03-18-25 @ 20:35)  Source: Blood Blood  Gram Stain (03-19-25 @ 22:16):    Growth in aerobic bottle: Gram Positive Cocci in Clusters  Preliminary Report (03-19-25 @ 22:17):    Growth in aerobic bottle: Gram Positive Cocci in Clusters    Direct identification is available within approximately 3-5    hours either by Blood Panel Multiplexed PCR or Direct    MALDI-TOF. Details: [email or web address]  Organism: Blood Culture PCR (03-19-25 @ 23:42)  Organism: Blood Culture PCR (03-19-25 @ 23:42)      Method Type: PCR      -  Staphylococcus epidermidis, Methicillin resistant: Detec        Culture - Urine (collected 03-18-25 @ 21:17)  Source: Clean Catch Clean Catch (Midstream)  Preliminary Report (03-19-25 @ 22:05):    >100,000 CFU/ml Escherichia coli                    Physical Examination:    General: No acute distress.      HEENT: Pupils equal, reactive to light.  Symmetric.    PULM: Clear to auscultation bilaterally, no significant sputum production    CVS: S1, S2    ABD: Soft, nondistended, nontender, normoactive bowel sounds, no masses    EXT: No edema, nontender    SKIN: Warm and well perfused, no rashes noted.    NEURO: Alert, oriented, interactive, nonfocal    RADIOLOGY REVIEWED  CXR:    CT chest: < from: CT Chest No Cont (03.19.25 @ 08:55) >    ACC: [id number] EXAM:  CT CHEST   ORDERED BY:  DANITZA SNYDER     PROCEDURE DATE:  03/19/2025          INTERPRETATION:  CLINICAL INFORMATION: Acute hypoxic respiratory failure.   Sepsis.    COMPARISON: None.    CONTRAST/COMPLICATIONS:  IV Contrast: NONE  Oral Contrast: NONE  .    PROCEDURE:  CT of the Chest was performed.  Sagittal and coronal reformats were performed.    FINDINGS:    LUNGS AND LARGE AIRWAYS: There is 5 mm fingerlike projection from right   aspect of upper tracheal wall, in keeping with a polyp versus adherent   mucus. Left lower lobe is low in volume. Mild bronchiectasis in the left   lower lobe with peribronchial wall thickening. There is a 6 mm solid   nodule in right lower lobe, adjacent to juncture of right major and minor   fissures (series 2, image 42). 5 mm groundglass nodule in the right upper   lobe (301-38). Dependent atelectasis.  PLEURA: Trace left-sided pleural effusion..  VESSELS: Aortic calcifications. Coronary artery calcifications. Main   pulmonary artery is mildly dilated measuring up to 3.4 cm.  HEART: Cardiomegaly. No pericardial effusion. There is calcification of   mitral valve annulus.  MEDIASTINUM AND NAYELY: No lymphadenopathy.  CHEST WALL AND LOWER NECK: 1.7 cm hyperattenuating nodule with internal   calcifications in the right thyroid lobe.  VISUALIZED UPPER ABDOMEN: Right hemidiaphragm is mildly elevated. 1.9 cm   left exophytic renal cyst measuring about 20 Hounsfield units on CT   attenuation, indeterminant. There is thickening of left adrenal gland   with no definite nodularity.  BONES: Degenerative changes. Degenerative changes and dextroscoliosis of   the thoracic spine.    IMPRESSION:  Mild bronchiectasis in the left lower lobe with peribronchial wall   thickening which may likely inflammatory.  Pulmonary nodules measuring up to 6 mm in the lung parenchyma. According   to Fleischner Society guideline for management of incidental pulmonary   nodules, follow-up CT chest in 6-12 months is recommended.  5 mm polyp versus adherent mucus in upper trachea. Attention to this area   on follow-up imaging is recommended.  Cardiomegaly.  1.7 cm hypoattenuating nodule with internal calcification the right   thyroid lobe. Recommend nonemergent thyroid ultrasound to further   evaluate.        --- End of Report ---      < end of copied text >       PULMONARY CONSULT    HPI: 89 y/o F with PMH of HLD, CKD3, legally blind, b/l SNHL, dementia (minimally verbally responsive baseline per ED), urinary retention, nontoxic goiter, GERD, constipation, anxiety, depression, R femur fx, presents from [facility] reportedly for respiratory distress (hypoxemia to SpO2% 80s by EMS) and decreased responsiveness. Patient nonverbal and unable to participate in interview. Collateral obtained from emergency contact via phone Rhiannon Santiago (family friend for many years) who reports that patient was recently found to be "packing food and medications in the back of her mouth", otherwise confirms aforementioned hx and states that patient has been nonverbal x 1 mo. Labs on admission notable for leukocytosis, elevated proBNP, UA+. CT chest with LLL bronchiectasis with peribronchial wall thickening, scattered pulmonary nodules. Pulmonary called to consult for hypoxia, r/o PNA. Pt nonverbal at baseline, lethargic on exam. Unable to participate in ROS. Breathing currently nonlabored. O2 sats 97% on 3LNC.    PAST MEDICAL & SURGICAL HISTORY:  HLD (hyperlipidemia)  Chronic kidney disease, unspecified CKD stage  Dementia  H/O urinary retention  Anxiety and depression  Legally blind  Hard of hearing  H/O fracture of femur  Nontoxic goiter  H/O constipation  GERD (gastroesophageal reflux disease)    No Known Allergies    FAMILY HISTORY: non contributory     Social history: unable to obtain     Review of Systems: unable to obtain      Medications:  MEDICATIONS  (STANDING):  dextrose 5% + sodium chloride 0.9%. 1000 milliLiter(s) (50 mL/Hr) IV Continuous <Continuous>  heparin   Injectable 5000 Unit(s) SubCutaneous every 12 hours  piperacillin/tazobactam IVPB.. 3.375 Gram(s) IV Intermittent every 12 hours      Vital Signs Last 24 Hrs  T(C): 36.9 (20 Mar 2025 07:40), Max: 37.4 (20 Mar 2025 04:41)  T(F): 98.4 (20 Mar 2025 07:40), Max: 99.3 (20 Mar 2025 04:41)  HR: 69 (20 Mar 2025 07:40) (69 - 92)  BP: 131/79 (20 Mar 2025 07:40) (126/65 - 149/79)  BP(mean): --  RR: 18 (20 Mar 2025 07:40) (18 - 20)  SpO2: 97% (20 Mar 2025 07:40) (94% - 98%)    Parameters below as of 20 Mar 2025 07:40  Patient On (Oxygen Delivery Method): nasal cannula  O2 Flow (L/min): 3        VBG pH 7.34 03-19 @ 09:58  VBG pCO2 45 03-19 @ 09:58  VBG O2 sat 28.3 03-19 @ 09:58  VBG lactate 3.6 03-19 @ 09:58  VBG pH 7.33 03-18 @ 19:25  VBG pCO2 47 03-18 @ 19:25  VBG O2 sat 46.6 03-18 @ 19:25  VBG lactate 1.9 03-18 @ 19:25        03-19 @ 07:01  -  03-20 @ 07:00  --------------------------------------------------------  IN: 1500 mL / OUT: 0 mL / NET: 1500 mL          LABS:                        9.7    12.41 )-----------( 318      ( 20 Mar 2025 07:12 )             32.3     03-20    155[H]  |  121[H]  |  30[H]  ----------------------------<  124[H]  3.4[L]   |  23  |  1.45[H]    Ca    9.4      20 Mar 2025 07:15  Phos  3.3     03-19  Mg     2.1     03-19    TPro  7.0  /  Alb  3.5  /  TBili  0.6  /  DBili  x   /  AST  14  /  ALT  21  /  AlkPhos  108  03-18          CAPILLARY BLOOD GLUCOSE          Urinalysis Basic - ( 20 Mar 2025 07:15 )    Color: x / Appearance: x / SG: x / pH: x  Gluc: 124 mg/dL / Ketone: x  / Bili: x / Urobili: x   Blood: x / Protein: x / Nitrite: x   Leuk Esterase: x / RBC: x / WBC x   Sq Epi: x / Non Sq Epi: x / Bacteria: x            CULTURES: (if applicable)     (collected 03-18-25 @ 20:50)  Source: Blood Blood  Preliminary Report (03-20-25 @ 02:03):    No growth at 24 hours     (collected 03-18-25 @ 20:35)  Source: Blood Blood  Gram Stain (03-19-25 @ 22:16):    Growth in aerobic bottle: Gram Positive Cocci in Clusters  Preliminary Report (03-19-25 @ 22:17):    Growth in aerobic bottle: Gram Positive Cocci in Clusters    Direct identification is available within approximately 3-5    hours either by Blood Panel Multiplexed PCR or Direct    MALDI-TOF. Details: [email or web address]  Organism: Blood Culture PCR (03-19-25 @ 23:42)  Organism: Blood Culture PCR (03-19-25 @ 23:42)      Method Type: PCR      -  Staphylococcus epidermidis, Methicillin resistant: Detec        Culture - Urine (collected 03-18-25 @ 21:17)  Source: Clean Catch Clean Catch (Midstream)  Preliminary Report (03-19-25 @ 22:05):    >100,000 CFU/ml Escherichia coli                    Physical Examination:    General: No acute distress.      HEENT: Pupils equal, reactive to light.  Symmetric.    PULM: Clear to auscultation bilaterally, no significant sputum production    CVS: S1, S2    ABD: Soft, nondistended, nontender, normoactive bowel sounds, no masses    EXT: No edema, nontender    SKIN: Warm and well perfused, no rashes noted.    NEURO: Alert, oriented, interactive, nonfocal    RADIOLOGY REVIEWED  CXR:    CT chest: < from: CT Chest No Cont (03.19.25 @ 08:55) >    ACC: [id number] EXAM:  CT CHEST   ORDERED BY:  DANITZA SNYDER     PROCEDURE DATE:  03/19/2025          INTERPRETATION:  CLINICAL INFORMATION: Acute hypoxic respiratory failure.   Sepsis.    COMPARISON: None.    CONTRAST/COMPLICATIONS:  IV Contrast: NONE  Oral Contrast: NONE  .    PROCEDURE:  CT of the Chest was performed.  Sagittal and coronal reformats were performed.    FINDINGS:    LUNGS AND LARGE AIRWAYS: There is 5 mm fingerlike projection from right   aspect of upper tracheal wall, in keeping with a polyp versus adherent   mucus. Left lower lobe is low in volume. Mild bronchiectasis in the left   lower lobe with peribronchial wall thickening. There is a 6 mm solid   nodule in right lower lobe, adjacent to juncture of right major and minor   fissures (series 2, image 42). 5 mm groundglass nodule in the right upper   lobe (301-38). Dependent atelectasis.  PLEURA: Trace left-sided pleural effusion..  VESSELS: Aortic calcifications. Coronary artery calcifications. Main   pulmonary artery is mildly dilated measuring up to 3.4 cm.  HEART: Cardiomegaly. No pericardial effusion. There is calcification of   mitral valve annulus.  MEDIASTINUM AND NAYELY: No lymphadenopathy.  CHEST WALL AND LOWER NECK: 1.7 cm hyperattenuating nodule with internal   calcifications in the right thyroid lobe.  VISUALIZED UPPER ABDOMEN: Right hemidiaphragm is mildly elevated. 1.9 cm   left exophytic renal cyst measuring about 20 Hounsfield units on CT   attenuation, indeterminant. There is thickening of left adrenal gland   with no definite nodularity.  BONES: Degenerative changes. Degenerative changes and dextroscoliosis of   the thoracic spine.    IMPRESSION:  Mild bronchiectasis in the left lower lobe with peribronchial wall   thickening which may likely inflammatory.  Pulmonary nodules measuring up to 6 mm in the lung parenchyma. According   to Fleischner Society guideline for management of incidental pulmonary   nodules, follow-up CT chest in 6-12 months is recommended.  5 mm polyp versus adherent mucus in upper trachea. Attention to this area   on follow-up imaging is recommended.  Cardiomegaly.  1.7 cm hypoattenuating nodule with internal calcification the right   thyroid lobe. Recommend nonemergent thyroid ultrasound to further   evaluate.        --- End of Report ---      < end of copied text >       PULMONARY CONSULT    HPI: 89 y/o F with PMH of HLD, CKD3, legally blind, b/l SNHL, dementia (minimally verbally responsive baseline per ED), urinary retention, nontoxic goiter, GERD, constipation, anxiety, depression, R femur fx, presents from [facility] reportedly for respiratory distress (hypoxemia to SpO2% 80s by EMS) and decreased responsiveness. Patient nonverbal and unable to participate in interview. Collateral obtained from emergency contact via phone Rhiannon Santiago (family friend for many years) who reports that patient was recently found to be "packing food and medications in the back of her mouth", otherwise confirms aforementioned hx and states that patient has been nonverbal x 1 mo. Labs on admission notable for leukocytosis, elevated proBNP, UA+. CT chest with LLL bronchiectasis with peribronchial wall thickening, scattered pulmonary nodules. Pulmonary called to consult for hypoxia, r/o PNA. Pt nonverbal at baseline, lethargic on exam. Unable to participate in ROS. Breathing currently nonlabored. O2 sats 97% on 3LNC.    PAST MEDICAL & SURGICAL HISTORY:  HLD (hyperlipidemia)  Chronic kidney disease, unspecified CKD stage  Dementia  H/O urinary retention  Anxiety and depression  Legally blind  Hard of hearing  H/O fracture of femur  Nontoxic goiter  H/O constipation  GERD (gastroesophageal reflux disease)    No Known Allergies    FAMILY HISTORY: non contributory     Social history: unable to obtain     Review of Systems: unable to obtain      Medications:  MEDICATIONS  (STANDING):  dextrose 5% + sodium chloride 0.9%. 1000 milliLiter(s) (50 mL/Hr) IV Continuous <Continuous>  heparin   Injectable 5000 Unit(s) SubCutaneous every 12 hours  piperacillin/tazobactam IVPB.. 3.375 Gram(s) IV Intermittent every 12 hours      Vital Signs Last 24 Hrs  T(C): 36.9 (20 Mar 2025 07:40), Max: 37.4 (20 Mar 2025 04:41)  T(F): 98.4 (20 Mar 2025 07:40), Max: 99.3 (20 Mar 2025 04:41)  HR: 69 (20 Mar 2025 07:40) (69 - 92)  BP: 131/79 (20 Mar 2025 07:40) (126/65 - 149/79)  BP(mean): --  RR: 18 (20 Mar 2025 07:40) (18 - 20)  SpO2: 97% (20 Mar 2025 07:40) (94% - 98%)    Parameters below as of 20 Mar 2025 07:40  Patient On (Oxygen Delivery Method): nasal cannula  O2 Flow (L/min): 3        VBG pH 7.34 03-19 @ 09:58  VBG pCO2 45 03-19 @ 09:58  VBG O2 sat 28.3 03-19 @ 09:58  VBG lactate 3.6 03-19 @ 09:58  VBG pH 7.33 03-18 @ 19:25  VBG pCO2 47 03-18 @ 19:25  VBG O2 sat 46.6 03-18 @ 19:25  VBG lactate 1.9 03-18 @ 19:25        03-19 @ 07:01  -  03-20 @ 07:00  --------------------------------------------------------  IN: 1500 mL / OUT: 0 mL / NET: 1500 mL          LABS:                        9.7    12.41 )-----------( 318      ( 20 Mar 2025 07:12 )             32.3     03-20    155[H]  |  121[H]  |  30[H]  ----------------------------<  124[H]  3.4[L]   |  23  |  1.45[H]    Ca    9.4      20 Mar 2025 07:15  Phos  3.3     03-19  Mg     2.1     03-19    TPro  7.0  /  Alb  3.5  /  TBili  0.6  /  DBili  x   /  AST  14  /  ALT  21  /  AlkPhos  108  03-18          CAPILLARY BLOOD GLUCOSE          Urinalysis Basic - ( 20 Mar 2025 07:15 )    Color: x / Appearance: x / SG: x / pH: x  Gluc: 124 mg/dL / Ketone: x  / Bili: x / Urobili: x   Blood: x / Protein: x / Nitrite: x   Leuk Esterase: x / RBC: x / WBC x   Sq Epi: x / Non Sq Epi: x / Bacteria: x            CULTURES: (if applicable)     (collected 03-18-25 @ 20:50)  Source: Blood Blood  Preliminary Report (03-20-25 @ 02:03):    No growth at 24 hours     (collected 03-18-25 @ 20:35)  Source: Blood Blood  Gram Stain (03-19-25 @ 22:16):    Growth in aerobic bottle: Gram Positive Cocci in Clusters  Preliminary Report (03-19-25 @ 22:17):    Growth in aerobic bottle: Gram Positive Cocci in Clusters    Direct identification is available within approximately 3-5    hours either by Blood Panel Multiplexed PCR or Direct    MALDI-TOF. Details: [email or web address]  Organism: Blood Culture PCR (03-19-25 @ 23:42)  Organism: Blood Culture PCR (03-19-25 @ 23:42)      Method Type: PCR      -  Staphylococcus epidermidis, Methicillin resistant: Detec        Culture - Urine (collected 03-18-25 @ 21:17)  Source: Clean Catch Clean Catch (Midstream)  Preliminary Report (03-19-25 @ 22:05):    >100,000 CFU/ml Escherichia coli                    Physical Examination:    General: No acute distress.      HEENT: Pupils equal, reactive to light.  Symmetric.    PULM: Clear to auscultation bilaterally, no significant sputum production    CVS: S1, S2    ABD: Soft, nondistended, nontender, normoactive bowel sounds, no masses    EXT: No edema, nontender    SKIN: Warm and well perfused, no rashes noted.    NEURO: Alert, oriented, interactive, nonfocal    RADIOLOGY REVIEWED  CXR:    CT chest: < from: CT Chest No Cont (03.19.25 @ 08:55) >    ACC: [id number] EXAM:  CT CHEST   ORDERED BY:  DANITZA SNYDER     PROCEDURE DATE:  03/19/2025          INTERPRETATION:  CLINICAL INFORMATION: Acute hypoxic respiratory failure.   Sepsis.    COMPARISON: None.    CONTRAST/COMPLICATIONS:  IV Contrast: NONE  Oral Contrast: NONE  .    PROCEDURE:  CT of the Chest was performed.  Sagittal and coronal reformats were performed.    FINDINGS:    LUNGS AND LARGE AIRWAYS: There is 5 mm fingerlike projection from right   aspect of upper tracheal wall, in keeping with a polyp versus adherent   mucus. Left lower lobe is low in volume. Mild bronchiectasis in the left   lower lobe with peribronchial wall thickening. There is a 6 mm solid   nodule in right lower lobe, adjacent to juncture of right major and minor   fissures (series 2, image 42). 5 mm groundglass nodule in the right upper   lobe (301-38). Dependent atelectasis.  PLEURA: Trace left-sided pleural effusion..  VESSELS: Aortic calcifications. Coronary artery calcifications. Main   pulmonary artery is mildly dilated measuring up to 3.4 cm.  HEART: Cardiomegaly. No pericardial effusion. There is calcification of   mitral valve annulus.  MEDIASTINUM AND NAYELY: No lymphadenopathy.  CHEST WALL AND LOWER NECK: 1.7 cm hyperattenuating nodule with internal   calcifications in the right thyroid lobe.  VISUALIZED UPPER ABDOMEN: Right hemidiaphragm is mildly elevated. 1.9 cm   left exophytic renal cyst measuring about 20 Hounsfield units on CT   attenuation, indeterminant. There is thickening of left adrenal gland   with no definite nodularity.  BONES: Degenerative changes. Degenerative changes and dextroscoliosis of   the thoracic spine.    IMPRESSION:  Mild bronchiectasis in the left lower lobe with peribronchial wall   thickening which may likely inflammatory.  Pulmonary nodules measuring up to 6 mm in the lung parenchyma. According   to Fleischner Society guideline for management of incidental pulmonary   nodules, follow-up CT chest in 6-12 months is recommended.  5 mm polyp versus adherent mucus in upper trachea. Attention to this area   on follow-up imaging is recommended.  Cardiomegaly.  1.7 cm hypoattenuating nodule with internal calcification the right   thyroid lobe. Recommend nonemergent thyroid ultrasound to further   evaluate.        --- End of Report ---      < end of copied text >

## 2025-03-20 NOTE — PROVIDER CONTACT NOTE (CRITICAL VALUE NOTIFICATION) - TEST AND RESULT REPORTED:
lactate 3.6
blood culture collected on 3/18 shows growth in aerobic bottle gram positive cocci clusters

## 2025-03-20 NOTE — PROGRESS NOTE ADULT - SUBJECTIVE AND OBJECTIVE BOX
Podiatry pager #: [phone]    Patient is a 89y old  Female who presents with a chief complaint of AHRF, sepsis presume 2/2 PNA, UTI (20 Mar 2025 22:34)Podiatry consultation for evaluation of right heel wound      HPI:  hx obtained from alt MRN ([id number]) and [facility] documentation, 89F PMH HLD, CKD3, legally blind, b/l SNHL, dementia (minimally verbally responsive baseline per ED), urinary retention, nontoxic goiter, GERD, constipation, anxiety, depression, R femur fx, presents from [facility] reportedly for respiratory distress (hypoxemia to SpO2% 80s by EMS) and decreased responsiveness. Patient nonverbal and unable to participate in interview. Collateral obtained from emergency contact via phone Rhiannon Santiago (family friend for many years) who reports that patient was recently found to be "packing food and medications in the back of her mouth", otherwise confirms aforementioned hx and states that patient has been nonverbal x 1 mo.    HR 80s-90s, BP 140s-170s/60s-80s, RR 18-37, SpO2%  weaned to 3L NC    WBC 15.80 (neutrophil predominant); Na 148, BUN/SCr 37/1.31 (GFR 38), Ca2+ 11 (corrected); HST 64 -> 65, proBNP 2324; VBG 7.33/47/34/25      UA protein, +nitrite, large LE, 616 WBC, many bacteria, mod blood (3 RBC), 17 casts    COV/FluAB/RSV not det     CXR: Question small left pleural effusion and atelectasis.    s/p CTX 1g, LR 500cc    ED GOC discussion yielding DNR/DNI status with trial NIV (placed in orders)   (19 Mar 2025 03:14)      PAST MEDICAL & SURGICAL HISTORY:  HLD (hyperlipidemia)      Chronic kidney disease, unspecified CKD stage      Dementia      H/O urinary retention      Anxiety and depression      Legally blind      Hard of hearing      H/O fracture of femur      Nontoxic goiter      H/O constipation      GERD (gastroesophageal reflux disease)          MEDICATIONS  (STANDING):  albuterol/ipratropium for Nebulization 3 milliLiter(s) Nebulizer every 6 hours  dextrose 5%. 1000 milliLiter(s) (60 mL/Hr) IV Continuous <Continuous>  heparin   Injectable 5000 Unit(s) SubCutaneous every 12 hours  piperacillin/tazobactam IVPB.. 3.375 Gram(s) IV Intermittent every 12 hours    MEDICATIONS  (PRN):      Allergies    No Known Allergies    Intolerances        VITALS:    Vital Signs Last 24 Hrs  T(C): 37.2 (21 Mar 2025 04:49), Max: 37.2 (20 Mar 2025 20:25)  T(F): 99 (21 Mar 2025 04:49), Max: 99 (20 Mar 2025 20:25)  HR: 80 (21 Mar 2025 04:49) (69 - 83)  BP: 119/63 (21 Mar 2025 04:49) (119/63 - 163/68)  BP(mean): --  RR: 18 (21 Mar 2025 04:49) (18 - 20)  SpO2: 96% (21 Mar 2025 04:49) (94% - 97%)    Parameters below as of 21 Mar 2025 04:49  Patient On (Oxygen Delivery Method): room air  O2 Flow (L/min): 3      LABS:                          9.7    12.41 )-----------( 318      ( 20 Mar 2025 07:12 )             32.3       03-20    155[H]  |  121[H]  |  30[H]  ----------------------------<  124[H]  3.4[L]   |  23  |  1.45[H]    Ca    9.4      20 Mar 2025 07:15  Phos  3.3     03-19  Mg     2.1     03-19        CAPILLARY BLOOD GLUCOSE              LOWER EXTREMITY PHYSICAL EXAM:    Vasular: DP/PT _1/4, B/L, CFT <3_ seconds B/L, Temperature gradient wnl_, B/L.   Neuro: Epicritic sensation intact_ to the level of toes, B/L.  Skin: Right heel DTI: Mild subdermal hemorrhages measuring 1 cm in diameter.  No fluctuance no bulla mild periwound erythema.  No crepitus no clinical signs of infection no open ulcerations at this time.       RADIOLOGY & ADDITIONAL STUDIES:

## 2025-03-20 NOTE — CONSULT NOTE ADULT - ASSESSMENT
89F PMH HLD, CKD3, legally blind, b/l SNHL, dementia (minimally verbally responsive baseline per ED), urinary retention, nontoxic goiter, GERD, constipation, anxiety, depression, R femur fx, presents from [facility] reportedly for respiratory distress (hypoxemia to SpO2% 80s by EMS) and decreased responsiveness. Patient nonverbal and unable to participate in interview. patient has been nonverbal x 1 mo. noticed with hypernatremia       1- CKD III   2- hypernatremia  3- UTI       trend cr  to change ivf to d5w@ 60 cc hr  for high na   zosyn 3.375 g iv tid   strict I/O  BMP in am   d/w Dr. James

## 2025-03-20 NOTE — CONSULT NOTE ADULT - NS ATTEND AMEND GEN_ALL_CORE FT
Pt seen and examined with ACP.  Assessment and plan reviewed and discussed.  Agree with above.    Status of wounds and treatment recommendations d/w  pt's daughter at bedside.  All questions answered.   Daughter expressed understanding.    I spent 55  minutes face to face w/ this pt of which more than 50% of the time was spent counseling & coordinating care of this pt.
-? aspiration event  -Continue Zosyn  -Duoneb q6h  -Keep sats >90% with O2

## 2025-03-20 NOTE — PROGRESS NOTE ADULT - ASSESSMENT
89F PMH HLD, CKD3, legally blind, b/l SNHL, dementia (minimally verbally responsive baseline per ED), urinary retention, nontoxic goiter, GERD, constipation, anxiety, depression, R femur fx, presents from [facility] reportedly for respiratory distress (hypoxemia to SpO2% 80s by EMS) and decreased responsiveness a/w sepsis 2/2 PNA/UTI c/f aspiration    Sepsis due to pneumonia.   - SIRS+ (HR, RR, WBC) with reported packing of food in back of mouth, though CXR w/o consolidation, would be c/f aspiration PNA as source. Additionally +UA unable to verify if sx. S/p CTX in ED  - dose zosyn pending Cx (hold azithro given QTc prolongation), MRSA/legionella/strep  - CT chest NC to further evaluate   - NPO  - S&S noted  - aspiration precautions.    Acute UTI.   - antibiotic  - ID follow    Acute hypoxemic respiratory failure.   - presume 2/2 aspiration PNA however given RLE pitting edema would r/o PE.   - wean O2 as tolerates   - euvolemic-appearing, though proBNP elevated.   - TTE eval for HF/RHS noted    Metabolic encephalopathy.   - presume i/s/o infxn above superimposed on baseline dementia, ctm and if not improving w/ tx would consider further eval.    Hypercalcemia.   - s/p IVF in ED, c/w gentle mIVF LR ON and reassess in AM with iPTH.    Hypernatremia.   - IVF  - Nephrology evaluation Dr Rosario    Chronic hypertension.   - not on home antihypertensives, ctm given sepsis.    Stage 3 chronic kidney disease.   - stable compared to baseline 4/29/2024 (1.41), monitor BMP/UOP, dose per GFR, avoid toxins.  - Nephrology evaluation    Abnormal findings on examination of body structure. / Osteoma  - R forehead firm, circumscribed, immobile mass nontender to palpation w/o report of fall from emergency contact/ED.      Dementia.   - baseline nonverbal per emergency contact  - fall/aspiration precautions.    Decubitus prevention  - turn  - heel wound care    Need for prophylactic measure.   - HSQ VTE PPx    Palliative team evaluation re [facility]    DNR    d/w ACP    Andres James MD phone [phone]

## 2025-03-21 NOTE — DIETITIAN INITIAL EVALUATION ADULT - REASON FOR ADMISSION
Altered mental status    per H&P: "89F PMH HLD, CKD3, legally blind, b/l SNHL, dementia (minimally verbally responsive baseline per ED), urinary retention, nontoxic goiter, GERD, constipation, anxiety, depression, R femur fx, presents from [facility] reportedly for respiratory distress (hypoxemia to SpO2% 80s by EMS) and decreased responsiveness."

## 2025-03-21 NOTE — CONSULT NOTE ADULT - ASSESSMENT
89F PMH HLD, CKD3, legally blind, b/l SNHL, dementia (minimally verbally responsive baseline per ED), urinary retention, nontoxic goiter, GERD, constipation, anxiety, depression, R femur fx, presents from [facility] reportedly for respiratory distress (hypoxemia to SpO2% 80s by EMS) and decreased responsiveness a/w sepsis 2/2 PNA/UTI c/f aspiration. Palliative care called for goc ISO of advanced illness.

## 2025-03-21 NOTE — PROGRESS NOTE ADULT - ASSESSMENT
89 y/o F with PMH of HLD, CKD3, legally blind, b/l SNHL, dementia (minimally verbally responsive baseline per ED), urinary retention, nontoxic goiter, GERD, constipation, anxiety, depression, R femur fx, presents from [facility] reportedly for respiratory distress (hypoxemia to SpO2% 80s by EMS) and decreased responsiveness. Patient nonverbal and unable to participate in interview. Collateral obtained from emergency contact via phone Rhiannon Santiago (family friend for many years) who reports that patient was recently found to be "packing food and medications in the back of her mouth", otherwise confirms aforementioned hx and states that patient has been nonverbal x 1 mo. Labs on admission notable for leukocytosis, elevated proBNP, UA+. Pulmonary called to consult for hypoxia, r/o PNA.

## 2025-03-21 NOTE — PROGRESS NOTE ADULT - ASSESSMENT
89F PMH HLD, CKD3, legally blind, b/l SNHL, dementia (minimally verbally responsive baseline per ED), urinary retention, nontoxic goiter, GERD, constipation, anxiety, depression, R femur fx, presents from [facility] reportedly for respiratory distress (hypoxemia to SpO2% 80s by EMS) and decreased responsiveness. Patient nonverbal and unable to participate in interview. patient has been nonverbal x 1 mo. noticed with hypernatremia       1- CKD III   2- hypernatremia  3- UTI       trend cr  to change ivf to d5w@ 60 cc hr  for high na   zosyn 3.375 g iv tid   strict I/O  BMP in am

## 2025-03-21 NOTE — DIETITIAN INITIAL EVALUATION ADULT - NSFNSNUTRCHEWSWALLOWFT_GEN_A_CORE
s/p swallow evaluation 3/19 recommending "Strict NPO, with non-oral nutrition/hydration/medications. Proceed with GOC conversation re: provision of nutrition/ lack of oral route in this patient with advanced dementia who has previously stated she would not want a feeding tube."

## 2025-03-21 NOTE — PROGRESS NOTE ADULT - SUBJECTIVE AND OBJECTIVE BOX
Follow-up Pulm Progress Note    ROS unable to be obtained  Breathing nonlabored on nasal cannula     Medications:  MEDICATIONS  (STANDING):  albuterol/ipratropium for Nebulization 3 milliLiter(s) Nebulizer every 6 hours  dextrose 5%. 1000 milliLiter(s) (60 mL/Hr) IV Continuous <Continuous>  heparin   Injectable 5000 Unit(s) SubCutaneous every 12 hours  piperacillin/tazobactam IVPB.. 3.375 Gram(s) IV Intermittent every 12 hours              Vital Signs Last 24 Hrs  T(C): 36.8 (21 Mar 2025 12:01), Max: 37.2 (20 Mar 2025 20:25)  T(F): 98.2 (21 Mar 2025 12:01), Max: 99 (20 Mar 2025 20:25)  HR: 83 (21 Mar 2025 12:01) (72 - 83)  BP: 136/70 (21 Mar 2025 12:01) (119/63 - 163/68)  BP(mean): --  RR: 19 (21 Mar 2025 12:01) (18 - 20)  SpO2: 97% (21 Mar 2025 12:01) (94% - 97%)    Parameters below as of 21 Mar 2025 12:01  Patient On (Oxygen Delivery Method): nasal cannula  O2 Flow (L/min): 3            03-20 @ 07:01  -  03-21 @ 07:00  --------------------------------------------------------  IN: 1550 mL / OUT: 250 mL / NET: 1300 mL          LABS:                        9.7    12.41 )-----------( 318      ( 20 Mar 2025 07:12 )             32.3     03-20    155[H]  |  121[H]  |  30[H]  ----------------------------<  124[H]  3.4[L]   |  23  |  1.45[H]    Ca    9.4      20 Mar 2025 07:15            CAPILLARY BLOOD GLUCOSE          Urinalysis Basic - ( 20 Mar 2025 07:15 )    Color: x / Appearance: x / SG: x / pH: x  Gluc: 124 mg/dL / Ketone: x  / Bili: x / Urobili: x   Blood: x / Protein: x / Nitrite: x   Leuk Esterase: x / RBC: x / WBC x   Sq Epi: x / Non Sq Epi: x / Bacteria: x      CULTURES: (if applicable)    Culture - Blood (collected 03-18-25 @ 20:50)  Source: Blood Blood  Preliminary Report (03-21-25 @ 02:02):    No growth at 48 Hours    Culture - Blood (collected 03-18-25 @ 20:35)  Source: Blood Blood  Gram Stain (03-19-25 @ 22:16):    Growth in aerobic bottle: Gram Positive Cocci in Clusters  Final Report (03-20-25 @ 14:55):    Growth in aerobic bottle: Staphylococcus epidermidis    Isolation of Coagulase negative Staphylococcus from single blood culture    sets may represent    contamination. Contact the Microbiology Department at [phone] if    susceptibility testing is needed.    clinically indicated.    Direct identification is available within approximately 3-5    hours either by Blood Panel Multiplexed PCR or Direct    MALDI-TOF. Details: [email or web address]  Organism: Blood Culture PCR (03-20-25 @ 14:55)  Organism: Blood Culture PCR (03-20-25 @ 14:55)      -  Staphylococcus epidermidis, Methicillin resistant: Detec      Method Type: PCR        Culture - Urine (collected 03-18-25 @ 21:17)  Source: Clean Catch Clean Catch (Midstream)  Final Report (03-21-25 @ 10:18):    >100,000 CFU/ml Escherichia coli    <10,000 CFU/ml Normal Urogenital palma present  Organism: Escherichia coli (03-21-25 @ 10:18)  Organism: Escherichia coli (03-21-25 @ 10:18)      -  Levofloxacin: S <=0.5      -  Tobramycin: S <=2      -  Nitrofurantoin: S <=32 Should not be used to treat pyelonephritis      -  Aztreonam: S <=4      -  Gentamicin: S <=2      -  Cefazolin: S <=2 For uncomplicated UTI with K. pneumoniae, E. coli, or P. mirablis: DELIA <=16 is sensitive and DELIA >=32 is resistant. This also predicts results for oral agents cefaclor, cefdinir, cefpodoxime, cefprozil, cefuroxime axetil, cephalexin and locarbef for uncomplicated UTI. Note that some isolates may be susceptible to these agents while testing resistant to cefazolin.      -  Cefepime: S <=2      -  Piperacillin/Tazobactam: S <=8      -  Ciprofloxacin: S <=0.25      -  Imipenem: S <=1      -  Ceftriaxone: S <=1      -  Ampicillin: S <=8 These ampicillin results predict results for amoxicillin      Method Type: DELIA      -  Meropenem: S <=1      -  Ampicillin/Sulbactam: S <=4/2      -  Cefoxitin: S <=8      -  Cefuroxime: S <=4      -  Amoxicillin/Clavulanic Acid: S <=8/4      -  Trimethoprim/Sulfamethoxazole: S <=0.5/9.5      -  Ertapenem: S <=0.5    Physical Examination:  PULM: Decreased BS   CVS: S1, S2 heard    RADIOLOGY REVIEWED      CT chest: < from: CT Chest No Cont (03.19.25 @ 08:55) >    ACC: [id number] EXAM:  CT CHEST   ORDERED BY:  DANITZA SNYDER     PROCEDURE DATE:  03/19/2025          INTERPRETATION:  CLINICAL INFORMATION: Acute hypoxic respiratory failure.   Sepsis.    COMPARISON: None.    CONTRAST/COMPLICATIONS:  IV Contrast: NONE  Oral Contrast: NONE  .    PROCEDURE:  CT of the Chest was performed.  Sagittal and coronal reformats were performed.    FINDINGS:    LUNGS AND LARGE AIRWAYS: There is 5 mm fingerlike projection from right   aspect of upper tracheal wall, in keeping with a polyp versus adherent   mucus. Left lower lobe is low in volume. Mild bronchiectasis in the left   lower lobe with peribronchial wall thickening. There is a 6 mm solid   nodule in right lower lobe, adjacent to juncture of right major and minor   fissures (series 2, image 42). 5 mm groundglass nodule in the right upper   lobe (301-38). Dependent atelectasis.  PLEURA: Trace left-sided pleural effusion..  VESSELS: Aortic calcifications. Coronary artery calcifications. Main   pulmonary artery is mildly dilated measuring up to 3.4 cm.  HEART: Cardiomegaly. No pericardial effusion. There is calcification of   mitral valve annulus.  MEDIASTINUM AND NAYELY: No lymphadenopathy.  CHEST WALL AND LOWER NECK: 1.7 cm hyperattenuating nodule with internal   calcifications in the right thyroid lobe.  VISUALIZED UPPER ABDOMEN: Right hemidiaphragm is mildly elevated. 1.9 cm   left exophytic renal cyst measuring about 20 Hounsfield units on CT   attenuation, indeterminant. There is thickening of left adrenal gland   with no definite nodularity.  BONES: Degenerative changes. Degenerative changes and dextroscoliosis of   the thoracic spine.    IMPRESSION:  Mild bronchiectasis in the left lower lobe with peribronchial wall   thickening which may likely inflammatory.  Pulmonary nodules measuring up to 6 mm in the lung parenchyma. According   to Fleischner Society guideline for management of incidental pulmonary   nodules, follow-up CT chest in 6-12 months is recommended.  5 mm polyp versus adherent mucus in upper trachea. Attention to this area   on follow-up imaging is recommended.  Cardiomegaly.  1.7 cm hypoattenuating nodule with internal calcification the right   thyroid lobe. Recommend nonemergent thyroid ultrasound to further   evaluate.        --- End of Report ---          CHICO GAY DO; Resident Radiologist  This document has been electronically signed.  RADHA HOOD MD; Attending Radiologist  This document has been electronically signed. Mar 19 2025  9:48AM    < end of copied text >

## 2025-03-21 NOTE — PROGRESS NOTE ADULT - SUBJECTIVE AND OBJECTIVE BOX
Patient is a 89y old  Female who presents with a chief complaint of AHRF, sepsis presume 2/2 PNA, UTI (21 Mar 2025 13:24)      SUBJECTIVE / OVERNIGHT EVENTS: lethargic  Review of Systems  unobtainable     MEDICATIONS  (STANDING):  albuterol/ipratropium for Nebulization 3 milliLiter(s) Nebulizer every 6 hours  dextrose 5%. 1000 milliLiter(s) (100 mL/Hr) IV Continuous <Continuous>  heparin   Injectable 5000 Unit(s) SubCutaneous every 12 hours  piperacillin/tazobactam IVPB.. 3.375 Gram(s) IV Intermittent every 12 hours    MEDICATIONS  (PRN):      Vital Signs Last 24 Hrs  T(C): 37.3 (21 Mar 2025 16:08), Max: 37.3 (21 Mar 2025 16:08)  T(F): 99.1 (21 Mar 2025 16:08), Max: 99.1 (21 Mar 2025 16:08)  HR: 89 (21 Mar 2025 16:08) (72 - 89)  BP: 124/65 (21 Mar 2025 16:08) (119/63 - 163/68)  BP(mean): --  RR: 19 (21 Mar 2025 16:08) (18 - 20)  SpO2: 96% (21 Mar 2025 16:08) (95% - 97%)    Parameters below as of 21 Mar 2025 16:08  Patient On (Oxygen Delivery Method): nasal cannula  O2 Flow (L/min): 3      PHYSICAL EXAM:  GENERAL: lethargic  HEAD:  Atraumatic, Normocephalic  EYES: EOMI, PERRLA, conjunctiva and sclera clear  NECK: Supple, No JVD  CHEST/LUNG: Clear to auscultation bilaterally; No wheeze  HEART: Regular rate and rhythm; No murmurs, rubs, or gallops  ABDOMEN: Soft, Nontender, Nondistended; Bowel sounds present  EXTREMITIES:  2+ Peripheral Pulses, No clubbing, cyanosis, or edema  PSYCH: AAOx0  NEUROLOGY: non-focal  SKIN: No rashes or lesions    LABS:                        9.7    12.41 )-----------( 318      ( 20 Mar 2025 07:12 )             32.3     03-21    148[H]  |  113[H]  |  19  ----------------------------<  152[H]  3.8   |  24  |  1.27    Ca    9.5      21 Mar 2025 17:23  Mg     1.8     03-21    TPro  5.8[L]  /  Alb  2.9[L]  /  TBili  0.6  /  DBili  x   /  AST  13  /  ALT  17  /  AlkPhos  83  03-21          Urinalysis Basic - ( 21 Mar 2025 17:23 )    Color: x / Appearance: x / SG: x / pH: x  Gluc: 152 mg/dL / Ketone: x  / Bili: x / Urobili: x   Blood: x / Protein: x / Nitrite: x   Leuk Esterase: x / RBC: x / WBC x   Sq Epi: x / Non Sq Epi: x / Bacteria: x        Culture - Urine (collected 18 Mar 2025 21:17)  Source: Clean Catch Clean Catch (Midstream)  Final Report (21 Mar 2025 10:18):    >100,000 CFU/ml Escherichia coli    <10,000 CFU/ml Normal Urogenital palma present  Organism: Escherichia coli (21 Mar 2025 10:18)  Organism: Escherichia coli (21 Mar 2025 10:18)    Culture - Blood (collected 18 Mar 2025 20:50)  Source: Blood Blood  Preliminary Report (21 Mar 2025 02:02):    No growth at 48 Hours    Culture - Blood (collected 18 Mar 2025 20:35)  Source: Blood Blood  Gram Stain (19 Mar 2025 22:16):    Growth in aerobic bottle: Gram Positive Cocci in Clusters  Final Report (20 Mar 2025 14:55):    Growth in aerobic bottle: Staphylococcus epidermidis    Isolation of Coagulase negative Staphylococcus from single blood culture    sets may represent    contamination. Contact the Microbiology Department at [phone] if    susceptibility testing is needed.    clinically indicated.    Direct identification is available within approximately 3-5    hours either by Blood Panel Multiplexed PCR or Direct    MALDI-TOF. Details: [email or web address]  Organism: Blood Culture PCR (20 Mar 2025 14:55)  Organism: Blood Culture PCR (20 Mar 2025 14:55)        RADIOLOGY & ADDITIONAL TESTS:    Imaging Personally Reviewed:    Consultant(s) Notes Reviewed:      Care Discussed with Consultants/Other Providers:

## 2025-03-21 NOTE — PROGRESS NOTE ADULT - ASSESSMENT
89F PMH HLD, CKD3, legally blind, b/l SNHL, dementia (minimally verbally responsive baseline per ED), urinary retention, nontoxic goiter, GERD, constipation, anxiety, depression, R femur fx, presents from [facility] for respiratory distress (hypoxemia to SpO2% 80s by EMS) and decreased responsiveness a/w sepsis 2/2 PNA/UTI c/f aspiration    Sepsis due to pneumonia.   - SIRS+ (HR, RR, WBC) with reported packing of food in back of mouth, though CXR w/o consolidation, would be c/f aspiration PNA as source. Additionally +UA unable to verify if sx. S/p CTX in ED  - dose zosyn pending Cx (hold azithro given QTc prolongation), MRSA/legionella/strep  - CT chest NC to further evaluate   - NPO  - S&S noted  - aspiration precautions.    Acute UTI.   - antibiotic  - ID follow    Acute hypoxemic respiratory failure.   - presume 2/2 aspiration PNA however given RLE pitting edema would r/o PE.   - wean O2 as tolerates   - euvolemic-appearing, though proBNP elevated.   - TTE eval for HF/RHS noted    Metabolic encephalopathy.   - presume i/s/o infxn above superimposed on baseline dementia, ctm and if not improving w/ tx would consider further eval.    Hypercalcemia.   - s/p IVF in ED, c/w gentle mIVF LR ON and reassess in AM with iPTH.    Hypernatremia.   - IVF  - Nephrology evaluation Dr Rosario    Chronic hypertension.   - not on home antihypertensives, ctm given sepsis.    Stage 3 chronic kidney disease.   - stable compared to baseline 4/29/2024 (1.41), monitor BMP/UOP, dose per GFR, avoid toxins.  - Nephrology evaluation    Abnormal findings on examination of body structure. / Osteoma  - R forehead firm, circumscribed, immobile mass nontender to palpation w/o report of fall from emergency contact/ED.      Dementia.   - baseline nonverbal per emergency contact  - fall/aspiration precautions.    Decubitus prevention  - turn  - heel wound care    Need for prophylactic measure.   - HSQ VTE PPx    Palliative team evaluation re GOC pending     DNR    d/w HCP (over phone) , ACP    Andres James MD phone [phone]

## 2025-03-21 NOTE — DIETITIAN INITIAL EVALUATION ADULT - ORAL INTAKE PTA/DIET HISTORY
Pt visited, Pt sleeping soundly at visit, no family at bedside at visit. Spoke to RN. RD to follow up with subjective information as able. Unable to assess appetite and PO intake PTA, adherence to therapeutic diet restrictions.   No food allergies noted per EMR.   Noted vitamin B12 and vitamin D supplementation per home medication list   per transfer sheets: regular diet, pureed texture, regular consistency

## 2025-03-21 NOTE — DIETITIAN INITIAL EVALUATION ADULT - NSFNSGIIOFT_GEN_A_CORE
RN denies signs and symptoms of nausea, vomiting, diarrhea, constipation at time of visit. last bowel movement unknown per flowsheets.  PMH constipation noted per chart

## 2025-03-21 NOTE — DIETITIAN INITIAL EVALUATION ADULT - PERTINENT LABORATORY DATA
03-20    155[H]  |  121[H]  |  30[H]  ----------------------------<  124[H]  3.4[L]   |  23  |  1.45[H]    Ca    9.4      20 Mar 2025 07:15

## 2025-03-21 NOTE — DIETITIAN INITIAL EVALUATION ADULT - PERTINENT MEDS FT
MEDICATIONS  (STANDING):  albuterol/ipratropium for Nebulization 3 milliLiter(s) Nebulizer every 6 hours  dextrose 5%. 1000 milliLiter(s) (60 mL/Hr) IV Continuous <Continuous>  heparin   Injectable 5000 Unit(s) SubCutaneous every 12 hours  piperacillin/tazobactam IVPB.. 3.375 Gram(s) IV Intermittent every 12 hours    MEDICATIONS  (PRN):

## 2025-03-21 NOTE — DIETITIAN INITIAL EVALUATION ADULT - NSFNSPHYEXAMSKINFT_GEN_A_CORE
per flowsheets:  - suspected deep tissue injury to B/L sacrum, right heel bunion   per wound care note 3/19: "Wound Consult requested to assist w/ management of:  Rt heel DTI  Xerosis  Buttocks w/ Moisture Dermatitis"

## 2025-03-21 NOTE — DIETITIAN INITIAL EVALUATION ADULT - OTHER INFO
elevated sodium, low potassium noted  IV fluids ordered per orders     Weights:  - UBW (per patient): unable to assess   - Dosing Weight (per chart): 89.9 pounds (3/18)   - Daily Weights (per flow sheets): 100.3 pounds (3/19)   weight discrepancy noted ?accuracy   Weight variance can be expected during admission possibly due to fluid shifts with edema present per flowsheets. RD will continue to monitor weight trends as able/available.   -  pounds +/- 10%   - Per [facility] HIMARGARITA:   RD to continue to monitor weights and trends as able.

## 2025-03-21 NOTE — CONSULT NOTE ADULT - PROBLEM SELECTOR RECOMMENDATION 9
-Reportedly with spo2 in 80s by EMS, requiring NRB.  -O2 requirements now improving, seen on 3LNC today  -? aspiration event  -Continue Zosyn  -Duoneb q6h  -Keep sats >90% with O2 PRN. Wean O2 as tolerated.
abx as per primary team  O2 supplementation as  needed

## 2025-03-21 NOTE — PROGRESS NOTE ADULT - PROBLEM SELECTOR PLAN 1
-Reportedly with spo2 in 80s by EMS, requiring NRB.  -O2 requirements now improving, tolerating 3LNC   -? aspiration event  -Continue Zosyn  -Duoneb q6h  -Keep sats >90% with O2 PRN. Wean O2 as tolerated.

## 2025-03-21 NOTE — CONSULT NOTE ADULT - REASON FOR ADMISSION
AHRF, sepsis presume 2/2 PNA, UTI

## 2025-03-21 NOTE — CONSULT NOTE ADULT - SUBJECTIVE AND OBJECTIVE BOX
Time and date of service:      HPI:  hx obtained from alt MRN ([id number]) and THIAGO documentation, 89F PMH HLD, CKD3, legally blind, b/l SNHL, dementia (minimally verbally responsive baseline per ED), urinary retention, nontoxic goiter, GERD, constipation, anxiety, depression, R femur fx, presents from [facility] reportedly for respiratory distress (hypoxemia to SpO2% 80s by EMS) and decreased responsiveness. Patient nonverbal and unable to participate in interview. Collateral obtained from emergency contact via phone Rhiannon Santiago (family friend for many years) who reports that patient was recently found to be "packing food and medications in the back of her mouth", otherwise confirms aforementioned hx and states that patient has been nonverbal x 1 mo.    HR 80s-90s, BP 140s-170s/60s-80s, RR 18-37, SpO2%  weaned to 3L NC    WBC 15.80 (neutrophil predominant); Na 148, BUN/SCr 37/1.31 (GFR 38), Ca2+ 11 (corrected); HST 64 -> 65, proBNP 2324; VBG 7.33/47/34/25      UA protein, +nitrite, large LE, 616 WBC, many bacteria, mod blood (3 RBC), 17 casts    COV/FluAB/RSV not det     CXR: Question small left pleural effusion and atelectasis.    s/p CTX 1g, LR 500cc    ED GOC discussion yielding DNR/DNI status with trial NIV (placed in orders)   (19 Mar 2025 03:14)    PERTINENT PM/SXH:   HLD (hyperlipidemia)    Chronic kidney disease, unspecified CKD stage    Dementia    H/O urinary retention    Anxiety and depression    Legally blind    Hard of hearing    H/O fracture of femur    Nontoxic goiter    H/O constipation    GERD (gastroesophageal reflux disease)        FAMILY HISTORY:    Family Hx substance abuse [ ]yes [ ]no    ITEMS NOT CHECKED ARE NOT PRESENT    SOCIAL HISTORY:   Significant other/partner[ ]  Children[ ]  Jainism/Spirituality:  Substance hx:  [ ]   Tobacco hx:  [ ]   Alcohol hx: [ ]   Home Opioid hx:  [ ] I-Stop Reference No:  Living Situation: [ ]Home  [ ]Long term care  [ ]Rehab [ ]Other    ADVANCE DIRECTIVES:    DNR/MOLST  [ ]  Living Will  [ ]   DECISION MAKER(s):  [ ] Health Care Proxy(s)  [ ] Surrogate(s)  [ ] Guardian           Name(s): Phone Number(s):    BASELINE (I)ADL(s) (prior to admission):  [place]: [ ]Total  [ ] Moderate [ ]Dependent    Allergies    No Known Allergies    Intolerances    MEDICATIONS  (STANDING):  albuterol/ipratropium for Nebulization 3 milliLiter(s) Nebulizer every 6 hours  dextrose 5%. 1000 milliLiter(s) (100 mL/Hr) IV Continuous <Continuous>  heparin   Injectable 5000 Unit(s) SubCutaneous every 12 hours  piperacillin/tazobactam IVPB.. 3.375 Gram(s) IV Intermittent every 12 hours  potassium chloride  10 mEq/100 mL IVPB 10 milliEquivalent(s) IV Intermittent every 1 hour    MEDICATIONS  (PRN):    PRESENT SYMPTOMS: [ ]Unable to self-report  [ ] CPOT [ ] PAINADs [ ] RDOS  Source if other than patient:  [ ]Family   [ ]Team     Pain: [ ]yes [ ]no  QOL impact -   Location -                    Aggravating factors -  Quality -  Radiation -  Timing-  Severity (0-10 scale):  Minimal acceptable level (0-10 scale):     CPOT:    https://www.sccm.org/getattachment/nho31k95-6w4b-4z0r-4f7m-0794i8298n0q/Critical-Care-Pain-Observation-Tool-(CPOT)    PAIN AD Score:   http://geriatrictoolkit.[facility]/cog/painad.pdf (press ctrl +  left click to view)    Dyspnea:                           [ ]Mild [ ]Moderate [ ]Severe      RDOS:  0 to 2  minimal or no respiratory distress   3  mild distress  4 to 6 moderate distress  >7 severe distress  https://[email or web address].[email or web address]/handouts/hen/Respiratory_Distress_Observation_Scale.pdf (Ctrl +  left click to view)     Anxiety:                             [ ]Mild [ ]Moderate [ ]Severe  Fatigue:                             [ ]Mild [ ]Moderate [ ]Severe  Nausea:                             [ ]Mild [ ]Moderate [ ]Severe  Loss of appetite:              [ ]Mild [ ]Moderate [ ]Severe  Constipation:                    [ ]Mild [ ]Moderate [ ]Severe    PCSSQ[Palliative Care Spiritual Screening Question]   Severity (0-10):  Score of 4 or > indicate consideration of Chaplaincy referral.  Chaplaincy Referral: [ ] yes [ ] refused [ ] following [ ] Deferred     Caregiver Tensed? : [ ] yes [ ] no [ ] Deferred [ ] Declined             Social work referral [ ] Patient & Family Centered Care Referral [ ]     Anticipatory Grief present?:  [ ] yes [ ] no  [ ] Deferred                  Social work referral [ ] Chaplaincy Referral[ ]      Other Symptoms:  [ ]All other review of systems negative     Palliative Performance Status Version 2:         %    http://[email or web address].org/files/news/palliative_performance_scale_ppsv2.pdf  PHYSICAL EXAM:  Vital Signs Last 24 Hrs  T(C): 36.8 (21 Mar 2025 12:01), Max: 37.2 (20 Mar 2025 20:25)  T(F): 98.2 (21 Mar 2025 12:01), Max: 99 (20 Mar 2025 20:25)  HR: 83 (21 Mar 2025 12:01) (72 - 83)  BP: 136/70 (21 Mar 2025 12:01) (119/63 - 163/68)  BP(mean): --  RR: 19 (21 Mar 2025 12:01) (18 - 20)  SpO2: 97% (21 Mar 2025 12:01) (94% - 97%)    Parameters below as of 21 Mar 2025 12:01  Patient On (Oxygen Delivery Method): nasal cannula  O2 Flow (L/min): 3   I&O's Summary    20 Mar 2025 07:01  -  21 Mar 2025 07:00  --------------------------------------------------------  IN: 1550 mL / OUT: 250 mL / NET: 1300 mL      GENERAL: [ ]Cachexia    [ ]Alert  [ ]Oriented x   [ ]Lethargic  [ ]Unarousable  [ ]Verbal  [ ]Non-Verbal  Behavioral:   [ ] Anxiety  [ ] Delirium [ ] Agitation [ ] Other  HEENT:  [ ]Normal   [ ]Dry mouth   [ ]ET Tube/Trach  [ ]Oral lesions  PULMONARY:   [ ]Clear [ ]Tachypnea  [ ]Audible excessive secretions   [ ]Rhonchi        [ ]Right [ ]Left [ ]Bilateral  [ ]Crackles        [ ]Right [ ]Left [ ]Bilateral  [ ]Wheezing     [ ]Right [ ]Left [ ]Bilateral  [ ]Diminished breath sounds [ ]right [ ]left [ ]bilateral  CARDIOVASCULAR:    [ ]Regular [ ]Irregular [ ]Tachy  [ ]Avinash [ ]Murmur [ ]Other  GASTROINTESTINAL:  [ ]Soft  [ ]Distended   [ ]+BS  [ ]Non tender [ ]Tender  [ ]Other [ ]PEG [ ]OGT/ NGT  Last BM:  GENITOURINARY:  [ ]Normal [ ] Incontinent   [ ]Oliguria/Anuria   [ ]Morales  MUSCULOSKELETAL:   [ ]Normal   [ ]Weakness  [ ]Bed/Wheelchair bound [ ]Edema  NEUROLOGIC:   [ ]No focal deficits  [ ]Cognitive impairment  [ ]Dysphagia [ ]Dysarthria [ ]Paresis [ ]Other   SKIN:   [ ]Normal  [ ]Rash  [ ]Other  [ ]Pressure ulcer(s)       Present on admission [ ]y [ ]n    CRITICAL CARE:  [ ] Shock Present  [ ]Septic [ ]Cardiogenic [ ]Neurologic [ ]Hypovolemic  [ ]  Vasopressors [ ]  Inotropes   [ ]Respiratory failure present [ ]Mechanical ventilation [ ]Non-invasive ventilatory support [ ]High flow    [ ]Acute  [ ]Chronic [ ]Hypoxic  [ ]Hypercarbic [ ]Other  [ ]Other organ failure     LABS:                        9.7    12.41 )-----------( 318      ( 20 Mar 2025 07:12 )             32.3   03-20    155[H]  |  121[H]  |  30[H]  ----------------------------<  124[H]  3.4[L]   |  23  |  1.45[H]    Ca    9.4      20 Mar 2025 07:15        Urinalysis Basic - ( 20 Mar 2025 07:15 )    Color: x / Appearance: x / SG: x / pH: x  Gluc: 124 mg/dL / Ketone: x  / Bili: x / Urobili: x   Blood: x / Protein: x / Nitrite: x   Leuk Esterase: x / RBC: x / WBC x   Sq Epi: x / Non Sq Epi: x / Bacteria: x      RADIOLOGY & ADDITIONAL STUDIES:    PROTEIN CALORIE MALNUTRITION PRESENT: [ ]mild [ ]moderate [ ]severe [ ]underweight [ ]morbid obesity  https://www.andeal.org/vault/6723/web/files/ONC/Table_Clinical%20Characteristics%20to%20Document%20Malnutrition-White%20JV%20et%20al%202012.pdf    Height (cm): 154.9 (03-18-25 @ 18:31)  Weight (kg): 40.8 (03-18-25 @ 18:31)  BMI (kg/m2): 17 (03-18-25 @ 18:31)    [ ]PPSV2 < or = to 30% [ ]significant weight loss  [ ]poor nutritional intake  [ ]anasarca[ ]Artificial Nutrition      Other REFERRALS:  [ ]Hospice  [ ]Child Life  [ ]Social Work  [ ]Case management [ ]Holistic Therapy     Goals of Care Document:  Time and date of service: 03-21-25 @ 12:23      HPI:  hx obtained from alt MRN ([id number]) and [facility] documentation, 89F PMH HLD, CKD3, legally blind, b/l SNHL, dementia (minimally verbally responsive baseline per ED), urinary retention, nontoxic goiter, GERD, constipation, anxiety, depression, R femur fx, presents from [facility] reportedly for respiratory distress (hypoxemia to SpO2% 80s by EMS) and decreased responsiveness. Patient nonverbal and unable to participate in interview. Collateral obtained from emergency contact via phone Rhiannon Santiago (family friend for many years) who reports that patient was recently found to be "packing food and medications in the back of her mouth", otherwise confirms aforementioned hx and states that patient has been nonverbal x 1 mo.    HR 80s-90s, BP 140s-170s/60s-80s, RR 18-37, SpO2%  weaned to 3L NC    WBC 15.80 (neutrophil predominant); Na 148, BUN/SCr 37/1.31 (GFR 38), Ca2+ 11 (corrected); HST 64 -> 65, proBNP 2324; VBG 7.33/47/34/25      UA protein, +nitrite, large LE, 616 WBC, many bacteria, mod blood (3 RBC), 17 casts    COV/FluAB/RSV not det     CXR: Question small left pleural effusion and atelectasis.    s/p CTX 1g, LR 500cc    ED GOC discussion yielding DNR/DNI status with trial NIV (placed in orders)   (19 Mar 2025 03:14)    PERTINENT PM/SXH:   HLD (hyperlipidemia)    Chronic kidney disease, unspecified CKD stage    Dementia    H/O urinary retention    Anxiety and depression    Legally blind    Hard of hearing    H/O fracture of femur    Nontoxic goiter    H/O constipation    GERD (gastroesophageal reflux disease)        FAMILY HISTORY: no significant fhx    Family Hx substance abuse [ ]yes [x ]no    ITEMS NOT CHECKED ARE NOT PRESENT    SOCIAL HISTORY:   Significant other/partner[x ]  Children[x ]  Hindu/Spirituality:  Substance hx:  [ ]   Tobacco hx:  [ ]   Alcohol hx: [ ]   Home Opioid hx:  [ ] I-Stop Reference No:  Living Situation: [ ]Home  [x ]Long term care  [ ]Rehab [ ]Other    ADVANCE DIRECTIVES:    DNR/MOLST  [ ]  Living Will  [ ]   DECISION MAKER(s):  [x ] Health Care Proxy(s)  [ ] Surrogate(s)  [ ] Guardian           Name(s): Phone Number(s): Sandy    BASELINE (I)ADL(s) (prior to admission):  [place]: [ ]Total  [ ] Moderate [x ]Dependent    Allergies    No Known Allergies    Intolerances    MEDICATIONS  (STANDING):  albuterol/ipratropium for Nebulization 3 milliLiter(s) Nebulizer every 6 hours  dextrose 5%. 1000 milliLiter(s) (100 mL/Hr) IV Continuous <Continuous>  heparin   Injectable 5000 Unit(s) SubCutaneous every 12 hours  piperacillin/tazobactam IVPB.. 3.375 Gram(s) IV Intermittent every 12 hours  potassium chloride  10 mEq/100 mL IVPB 10 milliEquivalent(s) IV Intermittent every 1 hour    MEDICATIONS  (PRN):    PRESENT SYMPTOMS: [x ]Unable to self-report  [ ] CPOT [ ] PAINADs [ ] RDOS  Source if other than patient:  [ ]Family   [x ]Team     Pain: [ ]yes [x ]no  QOL impact -   Location -                    Aggravating factors -  Quality -  Radiation -  Timing-  Severity (0-10 scale):  Minimal acceptable level (0-10 scale):     CPOT:    https://www.sccm.org/getattachment/rol70g61-3w2n-1y2b-0m3s-2755t8786m7g/Critical-Care-Pain-Observation-Tool-(CPOT)    PAIN AD Score:   http://geriatrictoolkit.[place].[facility]/cog/painad.pdf (press ctrl +  left click to view)    Dyspnea:                           [ ]Mild [ ]Moderate [ ]Severe      RDOS:  0 to 2  minimal or no respiratory distress   3  mild distress  4 to 6 moderate distress  >7 severe distress  https://homecareinformation.net/handouts/hen/Respiratory_Distress_Observation_Scale.pdf (Ctrl +  left click to view)     Anxiety:                             [ ]Mild [ ]Moderate [ ]Severe  Fatigue:                             [ ]Mild [ ]Moderate [ x]Severe  Nausea:                             [ ]Mild [ ]Moderate [ ]Severe  Loss of appetite:              [ ]Mild [ ]Moderate [x ]Severe  Constipation:                    [ ]Mild [ ]Moderate [ ]Severe    PCSSQ[Palliative Care Spiritual Screening Question]   Severity (0-10):  Score of 4 or > indicate consideration of Chaplaincy referral.  Chaplaincy Referral: [ ] yes [ ] refused [ ] following [x ] Deferred     Caregiver [place]? : [ ] yes [ ] no [x ] Deferred [ ] Declined             Social work referral [ ] Patient & Family Centered Care Referral [ ]     Anticipatory Grief present?:  [ ] yes [ ] no  [x ] Deferred                  Social work referral [ ] Chaplaincy Referral[ ]      Other Symptoms:  [ ]All other review of systems negative     Palliative Performance Status Version 2:     20    %    http://npcrc.org/files/news/palliative_performance_scale_ppsv2.pdf  PHYSICAL EXAM:  Vital Signs Last 24 Hrs  T(C): 36.8 (21 Mar 2025 12:01), Max: 37.2 (20 Mar 2025 20:25)  T(F): 98.2 (21 Mar 2025 12:01), Max: 99 (20 Mar 2025 20:25)  HR: 83 (21 Mar 2025 12:01) (72 - 83)  BP: 136/70 (21 Mar 2025 12:01) (119/63 - 163/68)  BP(mean): --  RR: 19 (21 Mar 2025 12:01) (18 - 20)  SpO2: 97% (21 Mar 2025 12:01) (94% - 97%)    Parameters below as of 21 Mar 2025 12:01  Patient On (Oxygen Delivery Method): nasal cannula  O2 Flow (L/min): 3   I&O's Summary    20 Mar 2025 07:01  -  21 Mar 2025 07:00  --------------------------------------------------------  IN: 1550 mL / OUT: 250 mL / NET: 1300 mL      GENERAL: [ ]Cachexia    [ ]Alert  [ ]Oriented x   [ x]Lethargic  [ ]Unarousable  [ ]Verbal  [x ]Non-Verbal  Behavioral:   [ ] Anxiety  [ ] Delirium [ ] Agitation [ ] Other  HEENT:  [ ]Normal   [x ]Dry mouth   [ ]ET Tube/Trach  [ ]Oral lesions  PULMONARY:   [ ]Clear [ ]Tachypnea  [ ]Audible excessive secretions   [ ]Rhonchi        [ ]Right [ ]Left [ ]Bilateral  [ ]Crackles        [ ]Right [ ]Left [ ]Bilateral  [ ]Wheezing     [ ]Right [ ]Left [ ]Bilateral  [x ]Diminished breath sounds [ ]right [ ]left [ ]bilateral  CARDIOVASCULAR:    [x ]Regular [ ]Irregular [ ]Tachy  [ ]Avinash [ ]Murmur [ ]Other  GASTROINTESTINAL:  [ x]Soft  [ ]Distended   x[ ]+BS  [ ]Non tender [ ]Tender  [ ]Other [ ]PEG [ ]OGT/ NGT  Last BM:  GENITOURINARY:  [ ]Normal [x ] Incontinent   [ ]Oliguria/Anuria   [ ]Morales  MUSCULOSKELETAL:   [ ]Normal   [ x]Weakness  [x ]Bed/Wheelchair bound [ ]Edema  NEUROLOGIC:   [ ]No focal deficits  [x ]Cognitive impairment  [x ]Dysphagia [ ]Dysarthria [ ]Paresis [ ]Other   SKIN: see rn flow sheet  [ ]Normal  [ ]Rash  [ ]Other  [ ]Pressure ulcer(s)       Present on admission [ ]y [ ]n    CRITICAL CARE:  [ ] Shock Present  [ ]Septic [ ]Cardiogenic [ ]Neurologic [ ]Hypovolemic  [ ]  Vasopressors [ ]  Inotropes   [ ]Respiratory failure present [ ]Mechanical ventilation [ ]Non-invasive ventilatory support [ ]High flow    [ ]Acute  [ ]Chronic [ ]Hypoxic  [ ]Hypercarbic [ ]Other  [ ]Other organ failure     LABS:                        9.7    12.41 )-----------( 318      ( 20 Mar 2025 07:12 )             32.3   03-20    155[H]  |  121[H]  |  30[H]  ----------------------------<  124[H]  3.4[L]   |  23  |  1.45[H]    Ca    9.4      20 Mar 2025 07:15        Urinalysis Basic - ( 20 Mar 2025 07:15 )    Color: x / Appearance: x / SG: x / pH: x  Gluc: 124 mg/dL / Ketone: x  / Bili: x / Urobili: x   Blood: x / Protein: x / Nitrite: x   Leuk Esterase: x / RBC: x / WBC x   Sq Epi: x / Non Sq Epi: x / Bacteria: x      RADIOLOGY & ADDITIONAL STUDIES:    < from: CT Head No Cont (03.19.25 @ 18:27) >  ACC: [id number] EXAM:  CT BRAIN   ORDERED BY: KRZYSZTOF C JUNIOR     PROCEDURE DATE:  03/19/2025          INTERPRETATION:  CLINICAL INDICATION:  ams    TECHNIQUE: Noncontrast CT examination of the head was performed.  Coronal and sagittal reformats werealso obtained.    COMPARISON: There are no prior studies available for comparison.    FINDINGS:  INTRA-AXIAL: No intracranial mass effect, acute hemorrhage, midline shift   or acute transcortical infarct is seen. Chronic cerebellar infarcts.   There are periventricular white matter hypodensities that are nonspecific   in nature but may reflect chronic ischemic microvascular disease.  EXTRA-AXIAL: No extra-axial fluid collection is present.  VENTRICLES AND SULCI: Parenchymal volume is commensuratewith patient   age. No hydrocephalus.  VISUALIZED SINUSES: Mild mucosal thickening.  VISUALIZED MASTOIDS: Clear.  CALVARIUM: Normal.    IMPRESSION:    No evidence of acute intracranial hemorrhage, midline shift or CT   evidence of acute territorial infarct.    If the patient's symptoms persist, consider short interval follow-up head   CT or brain MRI if there are no MRI contraindications.    --- End of Report ---            SHIRA JONES MD; Attending Radiologist  This document has been electronicallysigned. Mar 19 2025  6:32PM    < end of copied text >      PROTEIN CALORIE MALNUTRITION PRESENT: [ ]mild [ ]moderate [ ]severe [ ]underweight [ ]morbid obesity  https://www.andeal.org/vault/2440/web/files/ONC/Table_Clinical%20Characteristics%20to%20Document%20Malnutrition-White%20JV%20et%20al%202012.pdf    Height (cm): 154.9 (03-18-25 @ 18:31)  Weight (kg): 40.8 (03-18-25 @ 18:31)  BMI (kg/m2): 17 (03-18-25 @ 18:31)    [ ]PPSV2 < or = to 30% [ ]significant weight loss  [ ]poor nutritional intake  [ ]anasarca[ ]Artificial Nutrition      Other REFERRALS:  [ ]Hospice  [ ]Child Life  [ ]Social Work  [ ]Case management [ ]Holistic Therapy     Goals of Care Document:

## 2025-03-21 NOTE — PROGRESS NOTE ADULT - SUBJECTIVE AND OBJECTIVE BOX
[facility]   [phone]  RENAL FOLLOW UP NOTE  --------------------------------------------------------------------------------  Chief Complaint:    24 hour events/subjective:    seen earlier non interactive     PAST HISTORY  --------------------------------------------------------------------------------  No significant changes to PMH, PSH, FHx, SHx, unless otherwise noted    ALLERGIES & MEDICATIONS  --------------------------------------------------------------------------------  Allergies    No Known Allergies    Intolerances      Standing Inpatient Medications  albuterol/ipratropium for Nebulization 3 milliLiter(s) Nebulizer every 6 hours  dextrose 5%. 1000 milliLiter(s) IV Continuous <Continuous>  heparin   Injectable 5000 Unit(s) SubCutaneous every 12 hours  piperacillin/tazobactam IVPB.. 3.375 Gram(s) IV Intermittent every 12 hours    PRN Inpatient Medications      REVIEW OF SYSTEMS  --------------------------------------------------------------------------------    .    VITALS/PHYSICAL EXAM  --------------------------------------------------------------------------------  T(C): 37.2 (03-21-25 @ 20:26), Max: 37.3 (03-21-25 @ 16:08)  HR: 82 (03-21-25 @ 20:26) (77 - 89)  BP: 114/64 (03-21-25 @ 20:26) (114/64 - 144/64)  RR: 19 (03-21-25 @ 20:26) (18 - 20)  SpO2: 96% (03-21-25 @ 20:26) (95% - 97%)  Wt(kg): --        03-20-25 @ 07:01  -  03-21-25 @ 07:00  --------------------------------------------------------  IN: 1550 mL / OUT: 250 mL / NET: 1300 mL    03-21-25 @ 07:01  -  03-21-25 @ 22:15  --------------------------------------------------------  IN: 1260 mL / OUT: 200 mL / NET: 1060 mL      Physical Exam:  	    	Gen: comfortable appearing  frail elderly F poor dental hygiene   	no jvd  	Pulm: decrease bs  no rales or ronchi or wheezing  	CV: RRR, S1S2; no rub  	Abd: +BS, soft, nontender/nondistended  	: No bladder distention   	UE: Warm, no cyanosis  no clubbing,  no edema;  	LE: Warm, no cyanosis  no clubbing, no edema  	Neuro: non verbal     LABS/STUDIES  --------------------------------------------------------------------------------              9.7    12.41 >-----------<  318      [03-20-25 @ 07:12]              32.3     148  |  113  |  19  ----------------------------<  152      [03-21-25 @ 17:23]  3.8   |  24  |  1.27        Ca     9.5     [03-21-25 @ 17:23]      Mg     1.8     [03-21-25 @ 17:23]    TPro  5.8  /  Alb  2.9  /  TBili  0.6  /  DBili  x   /  AST  13  /  ALT  17  /  AlkPhos  83  [03-21-25 @ 17:23]          Creatinine Trend:  SCr 1.27 [03-21 @ 17:23]  SCr 1.45 [03-20 @ 07:15]  SCr 1.27 [03-19 @ 07:23]  SCr 1.31 [03-18 @ 19:31]          PTH -- (Ca 10.0)      [03-19-25 @ 10:06]   35  TSH 0.26      [03-20-25 @ 07:10]

## 2025-03-21 NOTE — CONSULT NOTE ADULT - NSCONSULTADDITIONALINFOA_GEN_ALL_CORE
if pt acutely decompensates and GOC are in line with comfort Recommend:  Dilaudid 0.5 mg IV q2h prn dyspnea or tachypnea  Dilaudid 0.5 mg IV q2h prn severe pain and Dilaudid 0.2 mg IV q2h prn moderate pain  Ativan 0.2 mg IV q2h prn agitation   Glycopyrrolate 0.4 mg IV q6h prn copious oral secretions   Dulcolax suppository daily prn constipation   Acetaminophen suppository 650 mg q6h prn fever  Zofran 4 mg IV q8h prn nausea or vomiting    Recommend discontinuation of all medications and interventions that do not serve goal of promoting patient's comfort and dignity at end-of-life.    Please page for any acute symptoms or further questions or concerns.

## 2025-03-21 NOTE — DIETITIAN INITIAL EVALUATION ADULT - REASON INDICATOR FOR ASSESSMENT
RD consult warranted for MST score 2 or greater, pressure injury stage 2 or greater   Source: Patient visited, RN, Electronic Medical Record  Chart reviewed, events noted.

## 2025-03-21 NOTE — CONSULT NOTE ADULT - TIME BILLING
symptom assessment and management, supportive counseling, coordination of care, discussion and coordination with team.    I personally spent 30 minutes on advance care planning services with the patient. This time is separate and distinct from any other care management services provided on this date.    Lilliam Frias, CLOTILDE-BC  Please contact me via Teams  between 6am-2pm. If not answering, please call the palliative care pager [phone]    After 2pm and on weekends, please see the contact information below:    In the event of newly developing, evolving, or worsening symptoms between 2pm and 5pm please contact the Palliative Medicine via extension [phone] for assistance.  After 5pm please contact team via pager (if the patient is at [facility] #[phone] or if the patient is at [facility] #[phone]) The Geriatric and Palliative Medicine service has coverage 24 hours a day/ 7 days a week to provide medical recommendations regarding symptom management needs via telephone

## 2025-03-21 NOTE — CONSULT NOTE ADULT - CONVERSATION DETAILS
Spoke with Asif pt  for approx 10 mins . He explains he is 89 years old and would like fo me to speak and defers decision making to Liv a family friend who help him with decisions. He explains "she knows better and can answer the questions that  I have.    Spoke with Liv for greater then 16 mins discussing ACP and GOC. Rhiannon has a good understanding of pts medical conditions and hospital course. She does explain that pt has a dtr but Asif defers to her to make decisions and communicate back to his dtr. Liv reports that pt would never want any aggressive intervention as this point in her life.  "she was always the boss" We discussed her risk of aspiration, failure of swallow study, and pleasure feeds.  Liv reports that pt would not want a NGT place of a PEG tube as this does not align with her GOC. We further discussed hospice services at a long term care facility as pt currently lives in assisted living that can no longer care for her with her advanced need.  Liv reports that they will be visiting on Saturday and she will discuss conversation with Asif and next steps to take. Presently pt is asymptomatic.  If symptoms develop she would be a good candidate for PCU.    Palliative care will follow up.

## 2025-03-21 NOTE — DIETITIAN INITIAL EVALUATION ADULT - REASON
unable to obtain consent, RD will continue to follow and obtain Nutrition Focused Physical Exam as feasible

## 2025-03-21 NOTE — DIETITIAN INITIAL EVALUATION ADULT - NSICDXPASTMEDICALHX_GEN_ALL_CORE_FT
PAST MEDICAL HISTORY:  Anxiety and depression     Chronic kidney disease, unspecified CKD stage     Dementia     GERD (gastroesophageal reflux disease)     H/O constipation     H/O fracture of femur     H/O urinary retention     Hard of hearing     HLD (hyperlipidemia)     Legally blind     Nontoxic goiter

## 2025-03-21 NOTE — DIETITIAN INITIAL EVALUATION ADULT - ADD RECOMMEND
Noted Pt s/p failed SLP evaluation recommending NPO. Nutrition plan to be consistent with goals of care. Monitor for goals of care.   Pending palliative consult noted. RD remains available for additional nutrition interventions as indicated and as consistent with goals of care.   1. As medically feasible, consider MVI and ascorbic acid daily to promote wound healing pending no medical contraindications   2. Monitor plan for diet advancement as feasible, skin, weight, nutrition related labs, GI function, goals of care   3. Nutrition risk notification entered in chart

## 2025-03-21 NOTE — CONSULT NOTE ADULT - PROBLEM SELECTOR RECOMMENDATION 4
-CT chest with 6mm nodule in RLL, 5 mm groundglass nodule in RUL, 5mm projection in upper tracheal wall (?mucus)  -Can consider repeat CT chest as an outpatient for f/u if aligns with GOC.
pt requiring full supportive care with all adl's

## 2025-03-21 NOTE — PROGRESS NOTE ADULT - PROBLEM SELECTOR PLAN 4
CT chest with 6mm nodule in RLL, 5 mm groundglass nodule in RUL, 5mm projection in upper tracheal wall (?mucus)  -Can consider repeat CT chest as an outpatient for f/u if aligns with GOC. CT chest with 6mm nodule in RLL, 5 mm groundglass nodule in RUL, 5mm projection in upper tracheal wall (likely mucus)  -Can consider repeat CT chest as an outpatient for f/u if aligns with GOC.

## 2025-03-21 NOTE — PROGRESS NOTE ADULT - ASSESSMENT
89y old Female with PMHx of HLD, CKD3, legally blind, b/l SNHL, dementia (minimally verbally responsive baseline per ED), urinary retention, nontoxic goiter, GERD, constipation, anxiety, depression, R femur fx, presents from [facility] reportedly for respiratory distress (hypoxemia to SpO2% 80s by EMS) and decreased responsiveness.    Upon presentation, patient was afebrile, hypoxic placed on NRBM switched to NC  Labs showed leukocytosis of 15.8-->14K    Workup:  -Covid/RSV/Flu neg   -UA: , many bacteria, + LE and nitrite   -Urine Cx: E coli   -CT chest: Mild bronchiectasis in the left lower lobe with peribronchial wall thickening which may likely inflammatory.  Pulmonary nodules measuring up to 6 mm in the lung parenchyma. 5 mm polyp versus adherent mucus in upper trachea. Attention to this area on follow-up imaging is recommended.Cardiomegaly.1.7 cm hypoattenuating nodule with internal calcification the right thyroid lobe. Recommend nonemergent thyroid ultrasound to further evaluate.  -Blood Cx:     #Acute hypoxic respiratory failure, possible aspiration PNA  #Acute encephalopathy   #Leukocytosis   #LUAN   #UTI  #CoNS in blood cx (1/4 bottles) suspect skin contaminant    Recommendations:   -Continue Zosyn - plan to complete 7 days course   -Send repeat blood Cx x 2   -SLP f/up  -Aspiration precautions  -F/up imaging for tracheal polyp Vs mucus per primary team     Discussed with primary team     Jeremy Ratliff MD  ID physician  [employer] Teams Preferred  After 5pm/weekends call [phone]

## 2025-03-21 NOTE — CONSULT NOTE ADULT - PROBLEM SELECTOR RECOMMENDATION 3
-UA +   -UC + E.Coli  -ABX as per ID.
Multifactorial including but not limited to infection, antibiotics, medication, dementia, hospitalization.

## 2025-03-21 NOTE — PROGRESS NOTE ADULT - SUBJECTIVE AND OBJECTIVE BOX
Follow Up:      Interval History/ROS:Patient is a 89y old  Female who presents with a chief complaint of AHRF, sepsis presume 2/2 PNA, UTI (21 Mar 2025 13:01)  Seen at bedside, no new events.   ROS unable to be performed patient non verbal    Allergies  No Known Allergies        ANTIMICROBIALS:    piperacillin/tazobactam IVPB.. 3.375 every 12 hours        OTHER MEDS: MEDICATIONS  (STANDING):  albuterol/ipratropium for Nebulization 3 every 6 hours  heparin   Injectable 5000 every 12 hours      Vital Signs Last 24 Hrs  T(F): 98.2 (03-21-25 @ 12:01), Max: 99.3 (03-20-25 @ 04:41)    Vital Signs Last 24 Hrs  HR: 83 (03-21-25 @ 12:01) (72 - 83)  BP: 136/70 (03-21-25 @ 12:01) (119/63 - 163/68)  RR: 19 (03-21-25 @ 12:01)  SpO2: 97% (03-21-25 @ 12:01) (94% - 97%)  Wt(kg): --    EXAM:    GA: NAD  CV: nl S1/S2  Lungs: CTAB  Abd: soft, nontender  Ext: no edema  Neuro: non verbal  IV: no phlebitis    Labs:                        9.7    12.41 )-----------( 318      ( 20 Mar 2025 07:12 )             32.3     03-20    155[H]  |  121[H]  |  30[H]  ----------------------------<  124[H]  3.4[L]   |  23  |  1.45[H]    Ca    9.4      20 Mar 2025 07:15        WBC Trend:  WBC Count: 12.41 (03-20-25 @ 07:12)  WBC Count: 14.22 (03-19-25 @ 07:23)  WBC Count: 15.80 (03-18-25 @ 19:31)      Creatine Trend:  Creatinine: 1.45 (03-20)  Creatinine: 1.27 (03-19)  Creatinine: 1.31 (03-18)      Liver Biochemical Testing Trend:  Alanine Aminotransferase (ALT/SGPT): 21 (03-18)  Aspartate Aminotransferase (AST/SGOT): 14 (03-18-25 @ 19:31)  Bilirubin Total: 0.6 (03-18)      Trend LDH      Urinalysis Basic - ( 20 Mar 2025 07:15 )    Color: x / Appearance: x / SG: x / pH: x  Gluc: 124 mg/dL / Ketone: x  / Bili: x / Urobili: x   Blood: x / Protein: x / Nitrite: x   Leuk Esterase: x / RBC: x / WBC x   Sq Epi: x / Non Sq Epi: x / Bacteria: x        MICROBIOLOGY:    MRSA PCR Result.: Peyton (03-19-25 @ 07:07)      Culture - Urine (collected 18 Mar 2025 21:17)  Source: Clean Catch Clean Catch (Midstream)  Final Report:    >100,000 CFU/ml Escherichia coli    <10,000 CFU/ml Normal Urogenital palma present  Organism: Escherichia coli  Organism: Escherichia coli    Sensitivities:      Method Type: DELIA      -  Amoxicillin/Clavulanic Acid: S <=8/4      -  Ampicillin: S <=8 These ampicillin results predict results for amoxicillin      -  Ampicillin/Sulbactam: S <=4/2      -  Aztreonam: S <=4      -  Cefazolin: S <=2 For uncomplicated UTI with K. pneumoniae, E. coli, or P. mirablis: DELIA <=16 is sensitive and DELIA >=32 is resistant. This also predicts results for oral agents cefaclor, cefdinir, cefpodoxime, cefprozil, cefuroxime axetil, cephalexin and locarbef for uncomplicated UTI. Note that some isolates may be susceptible to these agents while testing resistant to cefazolin.      -  Cefepime: S <=2      -  Cefoxitin: S <=8      -  Ceftriaxone: S <=1      -  Cefuroxime: S <=4      -  Ciprofloxacin: S <=0.25      -  Ertapenem: S <=0.5      -  Gentamicin: S <=2      -  Imipenem: S <=1      -  Levofloxacin: S <=0.5      -  Meropenem: S <=1      -  Nitrofurantoin: S <=32 Should not be used to treat pyelonephritis      -  Piperacillin/Tazobactam: S <=8      -  Tobramycin: S <=2      -  Trimethoprim/Sulfamethoxazole: S <=0.5/9.5    Culture - Blood (collected 18 Mar 2025 20:50)  Source: Blood Blood  Preliminary Report:    No growth at 48 Hours    Culture - Blood (collected 18 Mar 2025 20:35)  Source: Blood Blood  Final Report:    Growth in aerobic bottle: Staphylococcus epidermidis    Isolation of Coagulase negative Staphylococcus from single blood culture    sets may represent    contamination. Contact the Microbiology Department at [phone] if    susceptibility testing is needed.    clinically indicated.    Direct identification is available within approximately 3-5    hours either by Blood Panel Multiplexed PCR or Direct    MALDI-TOF. Details: [email or web address]  Organism: Blood Culture PCR  Organism: Blood Culture PCR    Sensitivities:      Method Type: PCR      -  Staphylococcus epidermidis, Methicillin resistant: Detec                                    D-Dimer Assay, Quantitative: 964 (03-19)        Troponin T, High Sensitivity Result: 65 (03-18)  Troponin T, High Sensitivity Result: 64 (03-18)    Lactate, Blood: 1.7 (03-19 @ 14:16)  Blood Gas Venous - Lactate: 3.6 (03-19 @ 09:58)  Blood Gas Venous - Lactate: 1.9 (03-18 @ 19:25)        RADIOLOGY:  imaging below personally reviewed    Xray Chest 1 View- PORTABLE-Urgent:  (18 Mar 2025 19:19)              CT Head No Cont:   ACC: [id number] EXAM:  CT BRAIN   ORDERED BY: KRZYSZTOF PACHECO     PROCEDURE DATE:  03/19/2025          INTERPRETATION:  CLINICAL INDICATION:  ams    TECHNIQUE: Noncontrast CT examination of the head was performed.  Coronal and sagittal reformats werealso obtained.    COMPARISON: There are no prior studies available for comparison.    FINDINGS:  INTRA-AXIAL: No intracranial mass effect, acute hemorrhage, midline shift   or acute transcortical infarct is seen. Chronic cerebellar infarcts.   There are periventricular white matter hypodensities that are nonspecific   in nature but may reflect chronic ischemic microvascular disease.  EXTRA-AXIAL: No extra-axial fluid collection is present.  VENTRICLES AND SULCI: Parenchymal volume is commensuratewith patient   age. No hydrocephalus.  VISUALIZED SINUSES: Mild mucosal thickening.  VISUALIZED MASTOIDS: Clear.  CALVARIUM: Normal.    IMPRESSION:    No evidence of acute intracranial hemorrhage, midline shift or CT   evidence of acute territorial infarct.    If the patient's symptoms persist, consider short interval follow-up head   CT or brain MRI if there are no MRI contraindications.    --- End of Report ---            SHIRA JONES MD; Attending Radiologist  This document has been electronicallysigned. Mar 19 2025  6:32PM (03-19-25 @ 18:27)  CT Chest No Cont:   ACC: [id number] EXAM:  CT CHEST   ORDERED BY:  DANITZA SNYDER     PROCEDURE DATE:  03/19/2025          INTERPRETATION:  CLINICAL INFORMATION: Acute hypoxic respiratory failure.   Sepsis.    COMPARISON: None.    CONTRAST/COMPLICATIONS:  IV Contrast: NONE  Oral Contrast: NONE  .    PROCEDURE:  CT of the Chest was performed.  Sagittal and coronal reformats were performed.    FINDINGS:    LUNGS AND LARGE AIRWAYS: There is 5 mm fingerlike projection from right   aspect of upper tracheal wall, in keeping with a polyp versus adherent   mucus. Left lower lobe is low in volume. Mild bronchiectasis in the left   lower lobe with peribronchial wall thickening. There is a 6 mm solid   nodule in right lower lobe, adjacent to juncture of right major and minor   fissures (series 2, image 42). 5 mm groundglass nodule in the right upper   lobe (301-38). Dependent atelectasis.  PLEURA: Trace left-sided pleural effusion..  VESSELS: Aortic calcifications. Coronary artery calcifications. Main   pulmonary artery is mildly dilated measuring up to 3.4 cm.  HEART: Cardiomegaly. No pericardial effusion. There is calcification of   mitral valve annulus.  MEDIASTINUM AND NAYELY: No lymphadenopathy.  CHEST WALL AND LOWER NECK: 1.7 cm hyperattenuating nodule with internal   calcifications in the right thyroid lobe.  VISUALIZED UPPER ABDOMEN: Right hemidiaphragm is mildly elevated. 1.9 cm   left exophytic renal cyst measuring about 20 Hounsfield units on CT   attenuation, indeterminant. There is thickening of left adrenal gland   with no definite nodularity.  BONES: Degenerative changes. Degenerative changes and dextroscoliosis of   the thoracic spine.    IMPRESSION:  Mild bronchiectasis in the left lower lobe with peribronchial wall   thickening which may likely inflammatory.  Pulmonary nodules measuring up to 6 mm in the lung parenchyma. According   to Fleischner Society guideline for management of incidental pulmonary   nodules, follow-up CT chest in 6-12 months is recommended.  5 mm polyp versus adherent mucus in upper trachea. Attention to this area   on follow-up imaging is recommended.  Cardiomegaly.  1.7 cm hypoattenuating nodule with internal calcification the right   thyroid lobe. Recommend nonemergent thyroid ultrasound to further   evaluate.        --- End of Report ---          CHICO GAY DO; Resident Radiologist  This document has been electronically signed.  RADHA HOOD MD; Attending Radiologist  This document has been electronically signed. Mar 19 2025  9:48AM (03-19-25 @ 08:55)

## 2025-03-22 NOTE — PROGRESS NOTE ADULT - SUBJECTIVE AND OBJECTIVE BOX
Patient is a 89y old  Female who presents with a chief complaint of AHRF, sepsis presume 2/2 PNA, UTI (21 Mar 2025 22:14)      SUBJECTIVE / OVERNIGHT EVENTS: lethargic HCP at bedside.  Review of Systems  unobtainable     MEDICATIONS  (STANDING):  albuterol/ipratropium for Nebulization 3 milliLiter(s) Nebulizer every 6 hours  dextrose 5% + sodium chloride 0.45%. 1000 milliLiter(s) (50 mL/Hr) IV Continuous <Continuous>  heparin   Injectable 5000 Unit(s) SubCutaneous every 12 hours  piperacillin/tazobactam IVPB.. 3.375 Gram(s) IV Intermittent every 12 hours    MEDICATIONS  (PRN):      Vital Signs Last 24 Hrs  T(C): 36.7 (22 Mar 2025 11:50), Max: 37.2 (21 Mar 2025 20:26)  T(F): 98.1 (22 Mar 2025 11:50), Max: 99 (21 Mar 2025 20:26)  HR: 92 (22 Mar 2025 11:50) (75 - 92)  BP: 145/79 (22 Mar 2025 11:50) (114/64 - 148/74)  BP(mean): --  RR: 18 (22 Mar 2025 11:50) (18 - 20)  SpO2: 93% (22 Mar 2025 11:50) (93% - 98%)    Parameters below as of 22 Mar 2025 11:50  Patient On (Oxygen Delivery Method): nasal cannula  O2 Flow (L/min): 3      PHYSICAL EXAM:  GENERAL: lethargic, opens eyes.  HEAD:  Atraumatic, Normocephalic  EYES: EOMI, PERRLA, conjunctiva and sclera clear  NECK: Supple, No JVD  CHEST/LUNG: Clear to auscultation bilaterally; No wheeze  HEART: Regular rate and rhythm; No murmurs, rubs, or gallops  ABDOMEN: Soft, Nontender, Nondistended; Bowel sounds present  EXTREMITIES:  2+ Peripheral Pulses, No clubbing, cyanosis, or edema   PSYCH: AAOx0  NEUROLOGY: non-focal  SKIN: No rashes or lesions    LABS:                        9.9    10.92 )-----------( 295      ( 22 Mar 2025 07:12 )             32.7     03-22    139  |  105  |  16  ----------------------------<  129[H]  3.5   |  22  |  1.12    Ca    9.1      22 Mar 2025 07:12  Mg     1.6     03-22    TPro  5.5[L]  /  Alb  2.6[L]  /  TBili  0.7  /  DBili  x   /  AST  17  /  ALT  20  /  AlkPhos  76  03-22          Urinalysis Basic - ( 22 Mar 2025 07:12 )    Color: x / Appearance: x / SG: x / pH: x  Gluc: 129 mg/dL / Ketone: x  / Bili: x / Urobili: x   Blood: x / Protein: x / Nitrite: x   Leuk Esterase: x / RBC: x / WBC x   Sq Epi: x / Non Sq Epi: x / Bacteria: x        Culture - Blood (collected 21 Mar 2025 11:47)  Source: Blood Blood-Peripheral  Preliminary Report (22 Mar 2025 16:01):    No growth at 24 hours    Culture - Blood (collected 21 Mar 2025 11:47)  Source: Blood Blood-Peripheral  Preliminary Report (22 Mar 2025 16:01):    No growth at 24 hours        RADIOLOGY & ADDITIONAL TESTS:    Imaging Personally Reviewed:    Consultant(s) Notes Reviewed:      Care Discussed with Consultants/Other Providers:

## 2025-03-22 NOTE — PROGRESS NOTE ADULT - ASSESSMENT
89F PMH HLD, CKD3, legally blind, b/l SNHL, dementia (minimally verbally responsive baseline per ED), urinary retention, nontoxic goiter, GERD, constipation, anxiety, depression, R femur fx, presents from [facility] for respiratory distress (hypoxemia to SpO2% 80s by EMS) and decreased responsiveness a/w sepsis 2/2 PNA/UTI c/f aspiration    Sepsis due to pneumonia.   - SIRS+ (HR, RR, WBC) with reported packing of food in back of mouth, though CXR w/o consolidation, would be c/f aspiration PNA as source. Additionally +UA unable to verify if sx. S/p CTX in ED  - dose zosyn pending Cx (hold azithro given QTc prolongation), MRSA/legionella/strep  - CT chest NC to further evaluate   - NPO  - IVF  - S&S noted  - aspiration precautions.    Acute UTI.   - antibiotic  - ID follow    Acute hypoxemic respiratory failure.   - presume 2/2 aspiration PNA however given RLE pitting edema would r/o PE.   - wean O2 as tolerates   - euvolemic-appearing, though proBNP elevated.   - TTE eval for HF/RHS noted    Metabolic encephalopathy.   - presume i/s/o infxn above superimposed on baseline dementia, ctm and if not improving w/ tx would consider further eval.    Hypercalcemia.   - s/p IVF in ED, c/w gentle mIVF LR ON and reassess in AM with iPTH.    Hypernatremia.   - IVF  - Nephrology follow Dr Rosario    Chronic hypertension.   - not on home antihypertensives, ctm given sepsis.    Stage 3 chronic kidney disease.   - stable compared to baseline 4/29/2024 (1.41), monitor BMP/UOP, dose per GFR, avoid toxins.  - Nephrology evaluation    Abnormal findings on examination of body structure. / Osteoma  - R forehead firm, circumscribed, immobile mass nontender to palpation w/o report of fall from emergency contact/ED.      Dementia.   - baseline nonverbal per emergency contact  - fall/aspiration precautions.    Malnutrition  - HCP refuse NGT/ PEG    Decubitus prevention  - turn  - heel wound care    Need for prophylactic measure.   - HSQ VTE PPx    Palliative team evaluation re GOC      DNR    d/w HCP , ACP    Andres James MD phone [phone]

## 2025-03-23 NOTE — PROGRESS NOTE ADULT - ASSESSMENT
89F PMH HLD, CKD3, legally blind, b/l SNHL, dementia (minimally verbally responsive baseline per ED), urinary retention, nontoxic goiter, GERD, constipation, anxiety, depression, R femur fx, presents from [facility] reportedly for respiratory distress (hypoxemia to SpO2% 80s by EMS) and decreased responsiveness a/w sepsis 2/2 PNA/UTI c/f aspiration    Sepsis due to pneumonia.   - SIRS+ (HR, RR, WBC) with reported packing of food in back of mouth, though CXR w/o consolidation, would be c/f aspiration PNA as source. Additionally +UA unable to verify if sx. S/p CTX in ED  - dose zosyn pending Cx (hold azithro given QTc prolongation), MRSA/legionella/strep  - CT chest noted  - IVF  - S&S noted  - aspiration precautions.    Acute UTI.   - antibiotic  - ID follow    Acute hypoxemic respiratory failure.   - presume 2/2 aspiration PNA however given RLE pitting edema would r/o PE.   - wean O2 as tolerates   - euvolemic-appearing, though proBNP elevated.   - TTE eval for HF/RHS noted    Metabolic encephalopathy.   - presume i/s/o infxn above superimposed on baseline dementia     Hypercalcemia.   - s/p IVF     Hypernatremia.   - IVF  - Nephrology follow Dr Rosario    Chronic hypertension.   - not on home antihypertensives, ctm given sepsis.    Stage 3 chronic kidney disease.   - stable compared to baseline 4/29/2024 (1.41), monitor BMP/UOP, dose per GFR, avoid toxins.  - Nephrology evaluation    Abnormal findings on examination of body structure. / Osteoma  - R forehead firm, circumscribed, immobile mass nontender to palpation w/o report of fall from emergency contact/ED.      Dementia.   - baseline nonverbal per emergency contact  - fall/aspiration precautions.    Malnutrition  - HCP refuse NGT/ PEG    Decubitus prevention  - turn  - heel wound care    Functional quadriplegia  - supportive care      Need for prophylactic measure.   - HSQ VTE PPx    Palliative team evaluation re [facility]      DNR    d/w ACP    Andres James MD phone [phone]

## 2025-03-23 NOTE — PROGRESS NOTE ADULT - SUBJECTIVE AND OBJECTIVE BOX
Patient is a 89y old  Female who presents with a chief complaint of AHRF, sepsis presume 2/2 PNA, UTI (22 Mar 2025 17:07)      SUBJECTIVE / OVERNIGHT EVENTS: lethargic , opens eyes.   Review of Systems  unobtainable     MEDICATIONS  (STANDING):  albuterol/ipratropium for Nebulization 3 milliLiter(s) Nebulizer every 6 hours  dextrose 5% + sodium chloride 0.45%. 1000 milliLiter(s) (50 mL/Hr) IV Continuous <Continuous>  heparin   Injectable 5000 Unit(s) SubCutaneous every 12 hours  piperacillin/tazobactam IVPB.. 3.375 Gram(s) IV Intermittent every 12 hours    MEDICATIONS  (PRN):      Vital Signs Last 24 Hrs  T(C): 36.9 (23 Mar 2025 11:47), Max: 37.8 (22 Mar 2025 19:27)  T(F): 98.4 (23 Mar 2025 11:47), Max: 100 (22 Mar 2025 19:27)  HR: 92 (23 Mar 2025 11:47) (92 - 108)  BP: 141/91 (23 Mar 2025 11:47) (124/67 - 148/74)  BP(mean): --  RR: 18 (23 Mar 2025 11:47) (18 - 19)  SpO2: 95% (23 Mar 2025 11:47) (95% - 96%)    Parameters below as of 23 Mar 2025 11:47  Patient On (Oxygen Delivery Method): nasal cannula  O2 Flow (L/min): 3      PHYSICAL EXAM:  GENERAL: NAD   HEAD:  Atraumatic, Normocephalic  EYES: EOMI, PERRLA, conjunctiva and sclera clear  NECK: Supple, No JVD  CHEST/LUNG: Clear to auscultation bilaterally; No wheeze  HEART: Regular rate and rhythm; No murmurs, rubs, or gallops  ABDOMEN: Soft, Nontender, Nondistended; Bowel sounds present  EXTREMITIES:  2+ Peripheral Pulses, No clubbing, cyanosis, or edema  PSYCH: lethargic  NEUROLOGY: non-focal   SKIN: No rashes or lesions    LABS:                        10.9   10.77 )-----------( 295      ( 23 Mar 2025 07:27 )             35.2     03-23    141  |  104  |  14  ----------------------------<  87  3.5   |  20[L]  |  1.20    Ca    9.5      23 Mar 2025 07:31  Phos  2.2     03-23  Mg     1.7     03-23    TPro  5.5[L]  /  Alb  2.6[L]  /  TBili  0.7  /  DBili  x   /  AST  17  /  ALT  20  /  AlkPhos  76  03-22          Urinalysis Basic - ( 23 Mar 2025 07:31 )    Color: x / Appearance: x / SG: x / pH: x  Gluc: 87 mg/dL / Ketone: x  / Bili: x / Urobili: x   Blood: x / Protein: x / Nitrite: x   Leuk Esterase: x / RBC: x / WBC x   Sq Epi: x / Non Sq Epi: x / Bacteria: x        Culture - Blood (collected 21 Mar 2025 11:47)  Source: Blood Blood-Peripheral  Preliminary Report (22 Mar 2025 16:01):    No growth at 24 hours    Culture - Blood (collected 21 Mar 2025 11:47)  Source: Blood Blood-Peripheral  Preliminary Report (22 Mar 2025 16:01):    No growth at 24 hours        RADIOLOGY & ADDITIONAL TESTS:    Imaging Personally Reviewed:  < from: CT Chest No Cont (03.19.25 @ 08:55) >  IMPRESSION:  Mild bronchiectasis in the left lower lobe with peribronchial wall   thickening which may likely inflammatory.  Pulmonary nodules measuring up to 6 mm in the lung parenchyma. According   to Fleischner Society guideline for management of incidental pulmonary   nodules, follow-up CT chest in 6-12 months is recommended.  5 mm polyp versus adherent mucus in upper trachea. Attention to this area   on follow-up imaging is recommended.  Cardiomegaly.  1.7 cm hypoattenuating nodule with internal calcification the right   thyroid lobe. Recommend nonemergent thyroid ultrasound to further   evaluate.    < end of copied text >    Consultant(s) Notes Reviewed:      Care Discussed with Consultants/Other Providers:

## 2025-03-24 NOTE — PROGRESS NOTE ADULT - PROBLEM SELECTOR PLAN 4
CT chest with 6mm nodule in RLL, 5 mm groundglass nodule in RUL, 5mm projection in upper tracheal wall (likely mucus)  -Can consider repeat CT chest as an outpatient in 1 month for f/u if aligns with GOC.

## 2025-03-24 NOTE — PROGRESS NOTE ADULT - PROBLEM SELECTOR PLAN 1
-Reportedly with spo2 in 80s by EMS, requiring NRB.  -O2 requirements now improving, tolerating 3LNC   -? aspiration event  -Continue Zosyn  -Duoneb q6h, can d/c nebs on discharge   -Lowered to 2LNC. Keep sats >90% with O2 PRN. Wean O2 as tolerated.

## 2025-03-24 NOTE — CHART NOTE - NSCHARTNOTEFT_GEN_A_CORE
NUTRITION FOLLOW UP NOTE    PATIENT SEEN FOR: follow up    SOURCE: [] Patient  [x] Current Medical Record  [x] RN  [] Family/support person at bedside  [x] Patient unavailable/inappropriate  [] Other:    CHART REVIEWED/EVENTS NOTED.  [] No changes to nutrition care plan to note  [x] Nutrition Status:  - h/o HLD, CKD III, legally blind, b/l SNHL, dementia (minimally verbally responsive baseline per D), urinary retention, nontoxic goiter, GERD, constipation, anxiety, depression, h/o R femur fx per chart  - adm from [facility] group home 2/2 respiratory distress, decreased responsiveness, admitted w/ sepsis 2/2 PNA/UTI c/f aspiration per MD    DIET ORDER:   Diet, NPO (03-19-25)    CURRENT DIET ORDER IS:  [] Appropriate:  [] Inadequate:  [x] Other:    NUTRITION INTAKE/PROVISION:  [x] PO:  - patient remains NPO x5 days  - per attending, refusal of NGT/PEG by HCP; confirmed by review of palliative care consult, ? for PCU transfer  [] Enteral Nutrition:  [] Parenteral Nutrition:    ANTHROPOMETRICS:  Drug Dosing Weight  Height (cm): 154.9 (18 Mar 2025 18:31)  Weight (kg): 40.8 (18 Mar 2025 18:31)  BMI (kg/m2): 17 (18 Mar 2025 18:31)  Weights:   3/19: 45.5kg  3/18: 40.8kg  - weight trend noted, ? accuracy of trend of almost +5kg x1 day, will continue to follow weight trend    MEDICATIONS:  MEDICATIONS  (STANDING):  albuterol/ipratropium for Nebulization 3 milliLiter(s) Nebulizer every 6 hours  dextrose 5% + sodium chloride 0.45%. 1000 milliLiter(s) (50 mL/Hr) IV Continuous <Continuous>  heparin   Injectable 5000 Unit(s) SubCutaneous every 12 hours  piperacillin/tazobactam IVPB.. 3.375 Gram(s) IV Intermittent every 12 hours    MEDICATIONS  (PRN):    NUTRITIONALLY PERTINENT LABS:  03-23 Na141 mmol/L Glu 87 mg/dL K+ 3.5 mmol/L Cr  1.20 mg/dL BUN 14 mg/dL 03-23 Phos 2.2 mg/dL[L] 03-22 Alb 2.6 g/dL[L]03-22 ALT 20 U/L AST 17 U/L Alkaline Phosphatase 76 U/L    Finger Sticks:  POCT Blood Glucose.: 70 mg/dL (03-24 @ 16:54)  POCT Blood Glucose.: 66 mg/dL (03-24 @ 16:53)    NUTRITIONALLY PERTINENT MEDICATIONS/LABS:  [x] Reviewed  [x] Relevant notes on medications/labs:  - hypophosphatemic 3/23  - hypoglycemic today    EDEMA:  [x] Reviewed  [x] Relevant notes:  - current +1 b/l ankle edema noted per documentation    GI/ I&O:  [x] Reviewed  [x] Relevant notes:  - last BM 3/23, not currently ordered for a bowel regimen  [] Other:    SKIN:   [] No pressure injuries documented, per nursing flowsheet  [x] Pressure injury previously noted: suspected deep tissue injuries to b/l sacrum, R heel bunion per documentation  [] Change in pressure injury documentation:  [] Other:    ESTIMATED NEEDS:  [x] No change:  [] Updated:  Energy: 1332-1523kcal/day (28-32kcal/kg)  Protein: 57-67g/day (1.2-1.4g/kg)  Fluid: ml/day or [x] defer to team  Based on: IBW 47.6kg    NUTRITION DIAGNOSIS:  [x] Prior Dx: increased nutrient needs (protein, calories), underweight  [x] New Dx: acute-moderate protein calorie malnutrition related to acute and chronic illnesses (dementia) w/ prolonged NPO period (5 days) as evidenced by <50% PO >/= 5 days, mild edema  Goal: patient will have her GOC wishes honored as it relates to nutrition and if pleasure feeds are pursued by family, food preferences will be honored as able within context of pleasure feed diet when outlined by team if this is pursued    EDUCATION:  [] Yes:  [x] Not appropriate/warranted    NUTRITION CARE PLAN:  1. Diet:  - NPO x5 days, noted per attending & palliative care note, HCP declining NGT/PEG as would not align with GOC  - if pleasure feeds are requested by family/in their GOC, defer to team/palliative care and will honor food preferences as able  - hypoglycemic today: D5 + NS IVF ordered by team  2. RD will continue to be available to provide appropriate nutritional guidance/recommendations    [] Achieved - Continue current nutrition intervention(s)  [] Current medical condition precludes nutrition intervention at this time.    MONITORING AND EVALUATION:   RD remains available upon request and will follow up per protocol.    Jeremy August RD, CDN - contact on TEAMS.

## 2025-03-24 NOTE — PROGRESS NOTE ADULT - SUBJECTIVE AND OBJECTIVE BOX
Patient is a 89y old  Female who presents with a chief complaint of AHRF, sepsis presume 2/2 PNA, UTI (24 Mar 2025 14:18)      SUBJECTIVE / OVERNIGHT EVENTS: Lethargic  Review of Systems  unobtainable     MEDICATIONS  (STANDING):  albuterol/ipratropium for Nebulization 3 milliLiter(s) Nebulizer every 6 hours  dextrose 5% + sodium chloride 0.45%. 1000 milliLiter(s) (50 mL/Hr) IV Continuous <Continuous>  dextrose 5% + sodium chloride 0.9%. 1000 milliLiter(s) (75 mL/Hr) IV Continuous <Continuous>  heparin   Injectable 5000 Unit(s) SubCutaneous every 12 hours  piperacillin/tazobactam IVPB.. 3.375 Gram(s) IV Intermittent every 12 hours    MEDICATIONS  (PRN):      Vital Signs Last 24 Hrs  T(C): 36.7 (24 Mar 2025 15:18), Max: 36.9 (24 Mar 2025 05:38)  T(F): 98.1 (24 Mar 2025 15:18), Max: 98.5 (24 Mar 2025 05:38)  HR: 99 (24 Mar 2025 15:18) (82 - 111)  BP: 109/68 (24 Mar 2025 15:18) (109/68 - 163/83)  BP(mean): --  RR: 18 (24 Mar 2025 15:18) (18 - 18)  SpO2: 92% (24 Mar 2025 15:18) (92% - 98%)    Parameters below as of 24 Mar 2025 15:18  Patient On (Oxygen Delivery Method): nasal cannula  O2 Flow (L/min): 3      PHYSICAL EXAM:  GENERAL: Lethargic  HEAD:  Atraumatic, Normocephalic  EYES: EOMI, PERRLA, conjunctiva and sclera clear  NECK: Supple, No JVD  CHEST/LUNG: Clear to auscultation bilaterally; No wheeze  HEART: Regular rate and rhythm; No murmurs, rubs, or gallops  ABDOMEN: Soft, Nontender, Nondistended; Bowel sounds present  EXTREMITIES:  2+ Peripheral Pulses, No clubbing, cyanosis, or edema  PSYCH: AAOx0  NEUROLOGY: non-focal  SKIN: No rashes or lesions    LABS:                        10.9   10.77 )-----------( 295      ( 23 Mar 2025 07:27 )             35.2     03-23    141  |  104  |  14  ----------------------------<  87  3.5   |  20[L]  |  1.20    Ca    9.5      23 Mar 2025 07:31  Phos  2.2     03-23  Mg     1.7     03-23            Urinalysis Basic - ( 23 Mar 2025 07:31 )    Color: x / Appearance: x / SG: x / pH: x  Gluc: 87 mg/dL / Ketone: x  / Bili: x / Urobili: x   Blood: x / Protein: x / Nitrite: x   Leuk Esterase: x / RBC: x / WBC x   Sq Epi: x / Non Sq Epi: x / Bacteria: x          RADIOLOGY & ADDITIONAL TESTS:    Imaging Personally Reviewed:    Consultant(s) Notes Reviewed:      Care Discussed with Consultants/Other Providers:

## 2025-03-24 NOTE — PROGRESS NOTE ADULT - SUBJECTIVE AND OBJECTIVE BOX
Follow-up Pulm Progress Note    ROS unable to be obtained  O2 sats 98% on 3LNC, lowered to 2LNC  Breathing nonlabored     Medications:  MEDICATIONS  (STANDING):  albuterol/ipratropium for Nebulization 3 milliLiter(s) Nebulizer every 6 hours  dextrose 5% + sodium chloride 0.45%. 1000 milliLiter(s) (50 mL/Hr) IV Continuous <Continuous>  heparin   Injectable 5000 Unit(s) SubCutaneous every 12 hours  piperacillin/tazobactam IVPB.. 3.375 Gram(s) IV Intermittent every 12 hours              Vital Signs Last 24 Hrs  T(C): 36.7 (24 Mar 2025 09:36), Max: 36.9 (23 Mar 2025 11:47)  T(F): 98 (24 Mar 2025 09:36), Max: 98.5 (24 Mar 2025 05:38)  HR: 83 (24 Mar 2025 09:36) (82 - 111)  BP: 163/83 (24 Mar 2025 09:36) (141/91 - 163/83)  BP(mean): --  RR: 18 (24 Mar 2025 09:36) (18 - 18)  SpO2: 98% (24 Mar 2025 09:36) (95% - 98%)    Parameters below as of 24 Mar 2025 09:36  Patient On (Oxygen Delivery Method): nasal cannula  O2 Flow (L/min): 3            03-23 @ 07:01  -  03-24 @ 07:00  --------------------------------------------------------  IN: 0 mL / OUT: 700 mL / NET: -700 mL          LABS:                        10.9   10.77 )-----------( 295      ( 23 Mar 2025 07:27 )             35.2     03-23    141  |  104  |  14  ----------------------------<  87  3.5   |  20[L]  |  1.20    Ca    9.5      23 Mar 2025 07:31  Phos  2.2     03-23  Mg     1.7     03-23            CAPILLARY BLOOD GLUCOSE          Urinalysis Basic - ( 23 Mar 2025 07:31 )    Color: x / Appearance: x / SG: x / pH: x  Gluc: 87 mg/dL / Ketone: x  / Bili: x / Urobili: x   Blood: x / Protein: x / Nitrite: x   Leuk Esterase: x / RBC: x / WBC x   Sq Epi: x / Non Sq Epi: x / Bacteria: x            CULTURES: (if applicable)    Culture - Blood (collected 03-21-25 @ 11:47)  Source: Blood Blood-Peripheral  Preliminary Report (03-23-25 @ 16:01):    No growth at 48 Hours    Culture - Blood (collected 03-21-25 @ 11:47)  Source: Blood Blood-Peripheral  Preliminary Report (03-23-25 @ 16:01):    No growth at 48 Hours    Culture - Blood (collected 03-18-25 @ 20:50)  Source: Blood Blood  Final Report (03-24-25 @ 02:00):    No growth at 5 days    Culture - Blood (collected 03-18-25 @ 20:35)  Source: Blood Blood  Gram Stain (03-19-25 @ 22:16):    Growth in aerobic bottle: Gram Positive Cocci in Clusters  Final Report (03-20-25 @ 14:55):    Growth in aerobic bottle: Staphylococcus epidermidis    Isolation of Coagulase negative Staphylococcus from single blood culture    sets may represent    contamination. Contact the Microbiology Department at [phone] if    susceptibility testing is needed.    clinically indicated.    Direct identification is available within approximately 3-5    hours either by Blood Panel Multiplexed PCR or Direct    MALDI-TOF. Details: [email or web address]  Organism: Blood Culture PCR (03-20-25 @ 14:55)  Organism: Blood Culture PCR (03-20-25 @ 14:55)      Method Type: PCR      -  Staphylococcus epidermidis, Methicillin resistant: Detec        Culture - Urine (collected 03-18-25 @ 21:17)  Source: Clean Catch Clean Catch (Midstream)  Final Report (03-21-25 @ 10:18):    >100,000 CFU/ml Escherichia coli    <10,000 CFU/ml Normal Urogenital palma present  Organism: Escherichia coli (03-21-25 @ 10:18)  Organism: Escherichia coli (03-21-25 @ 10:18)      Method Type: DELIA      -  Amoxicillin/Clavulanic Acid: S <=8/4      -  Ampicillin: S <=8 These ampicillin results predict results for amoxicillin      -  Ampicillin/Sulbactam: S <=4/2      -  Aztreonam: S <=4      -  Cefazolin: S <=2 For uncomplicated UTI with K. pneumoniae, E. coli, or P. mirablis: DELIA <=16 is sensitive and DELIA >=32 is resistant. This also predicts results for oral agents cefaclor, cefdinir, cefpodoxime, cefprozil, cefuroxime axetil, cephalexin and locarbef for uncomplicated UTI. Note that some isolates may be susceptible to these agents while testing resistant to cefazolin.      -  Cefepime: S <=2      -  Cefoxitin: S <=8      -  Ceftriaxone: S <=1      -  Cefuroxime: S <=4      -  Ciprofloxacin: S <=0.25      -  Ertapenem: S <=0.5      -  Gentamicin: S <=2      -  Imipenem: S <=1      -  Levofloxacin: S <=0.5      -  Meropenem: S <=1      -  Nitrofurantoin: S <=32 Should not be used to treat pyelonephritis      -  Piperacillin/Tazobactam: S <=8      -  Tobramycin: S <=2      -  Trimethoprim/Sulfamethoxazole: S <=0.5/9.5          Physical Examination:  PULM: Decreased BS   CVS: S1, S2 heard    RADIOLOGY REVIEWED  < from: CT Chest No Cont (03.19.25 @ 08:55) >    ACC: [id number] EXAM:  CT CHEST   ORDERED BY:  DANITZA SNYDER     PROCEDURE DATE:  03/19/2025          INTERPRETATION:  CLINICAL INFORMATION: Acute hypoxic respiratory failure.   Sepsis.    COMPARISON: None.    CONTRAST/COMPLICATIONS:  IV Contrast: NONE  Oral Contrast: NONE  .    PROCEDURE:  CT of the Chest was performed.  Sagittal and coronal reformats were performed.    FINDINGS:    LUNGS AND LARGE AIRWAYS: There is 5 mm fingerlike projection from right   aspect of upper tracheal wall, in keeping with a polyp versus adherent   mucus. Left lower lobe is low in volume. Mild bronchiectasis in the left   lower lobe with peribronchial wall thickening. There is a 6 mm solid   nodule in right lower lobe, adjacent to juncture of right major and minor   fissures (series 2, image 42). 5 mm groundglass nodule in the right upper   lobe (301-38). Dependent atelectasis.  PLEURA: Trace left-sided pleural effusion..  VESSELS: Aortic calcifications. Coronary artery calcifications. Main   pulmonary artery is mildly dilated measuring up to 3.4 cm.  HEART: Cardiomegaly. No pericardial effusion. There is calcification of   mitral valve annulus.  MEDIASTINUM AND NAYELY: No lymphadenopathy.  CHEST WALL AND LOWER NECK: 1.7 cm hyperattenuating nodule with internal   calcifications in the right thyroid lobe.  VISUALIZED UPPER ABDOMEN: Right hemidiaphragm is mildly elevated. 1.9 cm   left exophytic renal cyst measuring about 20 Hounsfield units on CT   attenuation, indeterminant. There is thickening of left adrenal gland   with no definite nodularity.  BONES: Degenerative changes. Degenerative changes and dextroscoliosis of   the thoracic spine.    IMPRESSION:  Mild bronchiectasis in the left lower lobe with peribronchial wall   thickening which may likely inflammatory.  Pulmonary nodules measuring up to 6 mm in the lung parenchyma. According   to Fleischner Society guideline for management of incidental pulmonary   nodules, follow-up CT chest in 6-12 months is recommended.  5 mm polyp versus adherent mucus in upper trachea. Attention to this area   on follow-up imaging is recommended.  Cardiomegaly.  1.7 cm hypoattenuating nodule with internal calcification the right   thyroid lobe. Recommend nonemergent thyroid ultrasound to further   evaluate.        --- End of Report ---    < end of copied text >

## 2025-03-24 NOTE — PROGRESS NOTE ADULT - ASSESSMENT
89y old Female with PMHx of HLD, CKD3, legally blind, b/l SNHL, dementia (minimally verbally responsive baseline per ED), urinary retention, nontoxic goiter, GERD, constipation, anxiety, depression, R femur fx, presents from [facility] reportedly for respiratory distress (hypoxemia to SpO2% 80s by EMS) and decreased responsiveness.    Upon presentation, patient was afebrile, hypoxic placed on NRBM switched to NC  Labs showed leukocytosis of 15.8-->14K    Workup:  -Covid/RSV/Flu neg   -UA: , many bacteria, + LE and nitrite   -Urine Cx: E coli   -CT chest: Mild bronchiectasis in the left lower lobe with peribronchial wall thickening which may likely inflammatory.  Pulmonary nodules measuring up to 6 mm in the lung parenchyma. 5 mm polyp versus adherent mucus in upper trachea. Attention to this area on follow-up imaging is recommended.Cardiomegaly.1.7 cm hypoattenuating nodule with internal calcification the right thyroid lobe. Recommend nonemergent thyroid ultrasound to further evaluate.  -Blood Cx: CoNS (1/4 bottles), repeat neg     #Acute hypoxic respiratory failure, possible aspiration PNA  #Acute encephalopathy   #Leukocytosis   #LUAN   #UTI  #CoNS in blood cx (1/4 bottles) suspect skin contaminant    Recommendations:   -Continue Zosyn - plan to complete 7 days course   -SLP f/up  -Aspiration precautions  -F/up imaging for tracheal polyp Vs mucus per primary team     Discussed with primary team     Jeremy Ratliff MD  ID physician  [employer] Teams Preferred  After 5pm/weekends call [phone]     89y old Female with PMHx of HLD, CKD3, legally blind, b/l SNHL, dementia (minimally verbally responsive baseline per ED), urinary retention, nontoxic goiter, GERD, constipation, anxiety, depression, R femur fx, [facility] respiratory distress (hypoxemia to SpO2% 80s by EMS) and decreased responsiveness.    Upon presentation, patient was afebrile, hypoxic placed on NRBM switched to NC  Labs showed leukocytosis of 15.8-->14K    Workup:  -Covid/RSV/Flu neg   -UA: , many bacteria, + LE and nitrite   -Urine Cx: E coli   -CT chest: Mild bronchiectasis in the left lower lobe with peribronchial wall thickening which may likely inflammatory.  Pulmonary nodules measuring up to 6 mm in the lung parenchyma. 5 mm polyp versus adherent mucus in upper trachea. Attention to this area on follow-up imaging is recommended.Cardiomegaly.1.7 cm hypoattenuating nodule with internal calcification the right thyroid lobe. Recommend nonemergent thyroid ultrasound to further evaluate.  -Blood Cx: CoNS (1/4 bottles), repeat neg     #Acute hypoxic respiratory failure, possible aspiration PNA  #Acute encephalopathy   #Leukocytosis   #LUAN   #UTI  #CoNS in blood cx (1/4 bottles) suspect skin contaminant    Recommendations:   -Continue Zosyn - plan to complete 7 days course   -SLP f/up  -Aspiration precautions  -F/up imaging for tracheal polyp Vs mucus per primary team     ID signing off, please call if any questions or change.     Discussed with primary team     Jeremy Ratliff MD  ID physician  [employer] Teams Preferred  After 5pm/weekends call [phone]

## 2025-03-24 NOTE — DIETITIAN NUTRITION RISK NOTIFICATION - TREATMENT: THE FOLLOWING DIET HAS BEEN RECOMMENDED
see nutrition initial assessment in documents for details   
Diet, CLOTILDE (03-19-25 @ 04:47) [Active]

## 2025-03-24 NOTE — PROGRESS NOTE ADULT - ASSESSMENT
89 y/o F with PMH of HLD, CKD3, legally blind, b/l SNHL, dementia (minimally verbally responsive baseline per ED), urinary retention, nontoxic goiter, GERD, constipation, anxiety, depression, R femur fx, presents from [facility] reportedly for respiratory distress (hypoxemia to SpO2% 80s by EMS) and decreased responsiveness. Patient nonverbal and unable to participate in interview. Collateral obtained from emergency contact via phone Rhiannon Santiago (family friend for many years) who reports that patient was recently found to be "packing food and medications in the back of her mouth", otherwise confirms aforementioned hx and states that patient has been nonverbal x 1 mo. Labs on admission notable for leukocytosis, elevated proBNP, UA+. Pulmonary called to consult for hypoxia, r/o PNA.

## 2025-03-24 NOTE — PROGRESS NOTE ADULT - SUBJECTIVE AND OBJECTIVE BOX
Follow Up:      Interval History/ROS:Patient is a 89y old  Female who presents with a chief complaint of AHRF, sepsis presume 2/2 PNA, UTI (24 Mar 2025 11:41)  Seen at bedside, no new events.     Allergies  No Known Allergies        ANTIMICROBIALS:    piperacillin/tazobactam IVPB.. 3.375 every 12 hours        OTHER MEDS: MEDICATIONS  (STANDING):  albuterol/ipratropium for Nebulization 3 every 6 hours  heparin   Injectable 5000 every 12 hours      Vital Signs Last 24 Hrs  T(F): 98 (03-24-25 @ 09:36), Max: 100 (03-22-25 @ 19:27)    Vital Signs Last 24 Hrs  HR: 83 (03-24-25 @ 09:36) (82 - 111)  BP: 163/83 (03-24-25 @ 09:36) (142/68 - 163/83)  RR: 18 (03-24-25 @ 09:36)  SpO2: 98% (03-24-25 @ 09:36) (96% - 98%)  Wt(kg): --    EXAM:    GA: NAD  CV: nl S1/S2  Lungs: CTAB  Abd: soft, nontender  Ext: no edema  Neuro: non verbal  IV: no phlebitis    Labs:                        10.9   10.77 )-----------( 295      ( 23 Mar 2025 07:27 )             35.2     03-23    141  |  104  |  14  ----------------------------<  87  3.5   |  20[L]  |  1.20    Ca    9.5      23 Mar 2025 07:31  Phos  2.2     03-23  Mg     1.7     03-23        WBC Trend:  WBC Count: 10.77 (03-23-25 @ 07:27)  WBC Count: 10.92 (03-22-25 @ 07:12)  WBC Count: 12.41 (03-20-25 @ 07:12)      Creatine Trend:  Creatinine: 1.20 (03-23)  Creatinine: 1.12 (03-22)  Creatinine: 1.27 (03-21)  Creatinine: 1.45 (03-20)      Liver Biochemical Testing Trend:  Alanine Aminotransferase (ALT/SGPT): 20 (03-22)  Alanine Aminotransferase (ALT/SGPT): 17 (03-21)  Alanine Aminotransferase (ALT/SGPT): 21 (03-18)  Aspartate Aminotransferase (AST/SGOT): 17 (03-22-25 @ 07:12)  Aspartate Aminotransferase (AST/SGOT): 13 (03-21-25 @ 17:23)  Aspartate Aminotransferase (AST/SGOT): 14 (03-18-25 @ 19:31)  Bilirubin Total: 0.7 (03-22)  Bilirubin Total: 0.6 (03-21)  Bilirubin Total: 0.6 (03-18)      Trend LDH      Urinalysis Basic - ( 23 Mar 2025 07:31 )    Color: x / Appearance: x / SG: x / pH: x  Gluc: 87 mg/dL / Ketone: x  / Bili: x / Urobili: x   Blood: x / Protein: x / Nitrite: x   Leuk Esterase: x / RBC: x / WBC x   Sq Epi: x / Non Sq Epi: x / Bacteria: x        MICROBIOLOGY:    MRSA PCR [facility].: [facility] (03-19-25 @ 07:07)      Culture - Blood (collected 21 Mar 2025 11:47)  Source: Blood Blood-Peripheral  Preliminary Report:    No growth at 48 Hours    Culture - Blood (collected 21 Mar 2025 11:47)  Source: Blood Blood-Peripheral  Preliminary Report:    No growth at 48 Hours    Culture - Urine (collected 18 Mar 2025 21:17)  Source: Clean Catch Clean Catch (Midstream)  Final Report:    >100,000 CFU/ml Escherichia coli    <10,000 CFU/ml Normal Urogenital palma present  Organism: Escherichia coli  Organism: Escherichia coli    Sensitivities:      Method Type: DELIA      -  Amoxicillin/Clavulanic Acid: S <=8/4      -  Ampicillin: S <=8 These ampicillin results predict results for amoxicillin      -  Ampicillin/Sulbactam: S <=4/2      -  Aztreonam: S <=4      -  Cefazolin: S <=2 For uncomplicated UTI with K. pneumoniae, E. coli, or P. mirablis: DELIA <=16 is sensitive and DELIA >=32 is resistant. This also predicts results for oral agents cefaclor, cefdinir, cefpodoxime, cefprozil, cefuroxime axetil, cephalexin and locarbef for uncomplicated UTI. Note that some isolates may be susceptible to these agents while testing resistant to cefazolin.      -  Cefepime: S <=2      -  Cefoxitin: S <=8      -  Ceftriaxone: S <=1      -  Cefuroxime: S <=4      -  Ciprofloxacin: S <=0.25      -  Ertapenem: S <=0.5      -  Gentamicin: S <=2      -  Imipenem: S <=1      -  Levofloxacin: S <=0.5      -  Meropenem: S <=1      -  Nitrofurantoin: S <=32 Should not be used to treat pyelonephritis      -  Piperacillin/Tazobactam: S <=8      -  Tobramycin: S <=2      -  Trimethoprim/Sulfamethoxazole: S <=0.5/9.5    Culture - Blood (collected 18 Mar 2025 20:50)  Source: Blood Blood  Final Report:    No growth at 5 days    Culture - Blood (collected 18 Mar 2025 20:35)  Source: Blood Blood  Final Report:    Growth in aerobic bottle: Staphylococcus epidermidis    Isolation of Coagulase negative Staphylococcus from single blood culture    sets may represent    contamination. Contact the Microbiology Department at [phone] if    susceptibility testing is needed.    clinically indicated.    Direct identification is available within approximately 3-5    hours either by Blood Panel Multiplexed PCR or Direct    MALDI-TOF. Details: [email or web address]  Organism: Blood Culture PCR  Organism: Blood Culture PCR    Sensitivities:      Method Type: PCR      -  Staphylococcus epidermidis, Methicillin resistant: Detec    Legionella Antigen, Urine: Negative (03-21 @ 07:09)    D-Dimer Assay, Quantitative: 964 (03-19)    Troponin T, High Sensitivity Result: 65 (03-18)  Troponin T, High Sensitivity Result: 64 (03-18)    RADIOLOGY:  imaging below personally reviewed      < from: CT Head No Cont (03.19.25 @ 18:27) >    IMPRESSION:    No evidence of acute intracranial hemorrhage, midline shift or CT   evidence of acute territorial infarct.    If the patient's symptoms persist, consider short interval follow-up head   CT or brain MRI if there are no MRI contraindications.    --- End of Report ---      < end of copied text >

## 2025-03-24 NOTE — PROGRESS NOTE ADULT - ASSESSMENT
89F PMH HLD, CKD3, legally blind, b/l SNHL, dementia (minimally verbally responsive baseline per ED), urinary retention, nontoxic goiter, GERD, constipation, anxiety, depression, R femur fx, presents from [facility] reportedly for respiratory distress (hypoxemia to SpO2% 80s by EMS) and decreased responsiveness a/w sepsis 2/2 PNA/UTI c/f aspiration    Sepsis due to pneumonia.   - SIRS+ (HR, RR, WBC) with reported packing of food in back of mouth, though CXR w/o consolidation, would be c/f aspiration PNA as source. Additionally +UA unable to verify if sx. S/p CTX in ED  - dose zosyn pending Cx (hold azithro given QTc prolongation), MRSA/legionella/strep  - CT chest noted  - IVF  - S&S noted  - aspiration precautions.    Acute UTI.   - antibiotic  - ID follow    Acute hypoxemic respiratory failure.   - presume 2/2 aspiration PNA however given RLE pitting edema would r/o PE.   - wean O2 as tolerates   - euvolemic-appearing, though proBNP elevated.   - TTE eval for HF/RHS noted    Metabolic encephalopathy.   - presume i/s/o infxn above superimposed on baseline dementia     Hypercalcemia.   - s/p IVF     Hypernatremia.   - IVF  - Nephrology follow Dr Rosario    Chronic hypertension.   - not on home antihypertensives, ctm given sepsis.    Stage 3 chronic kidney disease.   - stable compared to baseline 4/29/2024 (1.41), monitor BMP/UOP, dose per GFR, avoid toxins.  - Nephrology evaluation    Abnormal findings on examination of body structure. / Osteoma  - R forehead firm, circumscribed, immobile mass nontender to palpation w/o report of fall from emergency contact/ED.      Dementia.   - baseline nonverbal per emergency contact  - fall/aspiration precautions.    Malnutrition  - HCP refuse NGT/ PEG  - IVF    Decubitus prevention  - turn  - heel wound care    Functional quadriplegia  - supportive care      Need for prophylactic measure.   - HSQ VTE PPx    Palliative team follow re C      DNR    d/w ACP    Andres James MD phone [phone]

## 2025-03-25 NOTE — PHYSICAL THERAPY INITIAL EVALUATION ADULT - NSPTDISCHREC_GEN_A_CORE
Pt functionally dependent at baseline. If pt d/c home, pt would require Home PT, assist with ALL mobility, 24/7 supervision & DME: hospital bed, WC, total assist lift.  Pt is not appropriate for PT services at this time, pt is at functional baseline.  Pt to be d/c from PT program at this time, if pt status changes, please reconsult this discipline./Long Term Care/SNF Pt functionally dependent at baseline. If pt d/c home, pt would require assist with ALL mobility, 24/7 supervision & DME: hospital bed, WC, total assist lift.  Pt is not appropriate for PT services at this time, pt is at functional baseline.  Pt to be d/c from PT program at this time, if pt status changes, please reconsult this discipline./Long Term Care/SNF Pt functionally dependent at baseline. If pt d/c home, pt would require assist with ALL mobility, 24/7 supervision & DME: hospital bed, WC, total assist lift.  Pt is not appropriate for PT services at this time, pt is dependent, unable to participate in PT at this time.  Pt to be d/c from PT program at this time, if pt status changes, please reconsult this discipline./Long Term Care/SNF

## 2025-03-25 NOTE — PHYSICAL THERAPY INITIAL EVALUATION ADULT - PASSIVE RANGE OF MOTION EXAMINATION, REHAB EVAL
except bilateral hands/digits contracted/bilateral upper extremity Passive ROM was WFL (within functional limits)/bilateral lower extremity Passive ROM was WFL (within functional limits)

## 2025-03-25 NOTE — PROGRESS NOTE ADULT - SUBJECTIVE AND OBJECTIVE BOX
Time and date of service: 03-25-25 @ 09:47      SUBJECTIVE AND OBJECTIVE: pt sleeping in bed comfortably  Indication for Geriatrics and Palliative Care Services/INTERVAL HPI: goc    OVERNIGHT EVENTS: no acute events overnight    DNR on chart:DNI: Trial NIV  DNI: Trial NIV      Allergies    No Known Allergies    Intolerances    MEDICATIONS  (STANDING):  albuterol/ipratropium for Nebulization 3 milliLiter(s) Nebulizer every 6 hours  dextrose 5% + sodium chloride 0.45%. 1000 milliLiter(s) (50 mL/Hr) IV Continuous <Continuous>  dextrose 5% + sodium chloride 0.9%. 1000 milliLiter(s) (75 mL/Hr) IV Continuous <Continuous>  heparin   Injectable 5000 Unit(s) SubCutaneous every 12 hours  piperacillin/tazobactam IVPB.. 3.375 Gram(s) IV Intermittent every 12 hours    MEDICATIONS  (PRN):        ITEMS UNCHECKED ARE NOT PRESENT    PRESENT SYMPTOMS: [x ]Unable to self-report - see [ ] CPOT [ ] PAINADS [ ] RDOS  Source if other than patient:  [ ]Family   [ ]Team     Pain:  [ ]yes [x ]no  QOL impact -   Location -                    Aggravating factors -  Quality -  Radiation -  Timing-  Severity (0-10 scale):  Minimal acceptable level (0-10 scale):     CPOT:    https://www.[facility].org/getattachment/[email or web address]/Critical-Care-Pain-Observation-Tool-(CPOT)    PAIN AD Score:	  http://geriatrictoolkit.[facility]/cog/painad.pdf (Ctrl + left click to view)    Dyspnea:                           [ ]Mild [ ]Moderate [ ]Severe    RDOS:  0 to 2  minimal or no respiratory distress   3  mild distress  4 to 6 moderate distress  >7 severe distress  https://homecareinformation.net/handouts/hen/Respiratory_Distress_Observation_Scale.pdf (Ctrl +  left click to view)     Anxiety:                             [ ]Mild [ ]Moderate [ ]Severe  Fatigue:                             [ ]Mild [ ]Moderate [x ]Severe  Nausea:                            [ ]Mild [ ]Moderate [ ]Severe  Loss of appetite:               [ ]Mild [ ]Moderate [x ]Severe  Constipation:                    [ ]Mild [ ]Moderate [ ]Severe    PCSSQ[Palliative Care Spiritual Screening Question]   Severity (0-10):  Score of 4 or > indicate consideration of Chaplaincy referral.  Chaplaincy Referral: [ ] yes [ ] refused [ ] following [ x] Deferred     Caregiver Reinholds? : [ ] yes [ ] no [x ] Deferred [ ] Declined             Social work referral [ ] Patient & Family Centered Care Referral [ ]     Anticipatory Grief present?:  [ ] yes [ ] no  [x ] Deferred                  Social work referral [ ] Chaplaincy Referral[ ]      Other Symptoms:  [ ]All other review of systems negative     Palliative Performance Status Version 2:   20      %      http://npcrc.org/files/news/palliative_performance_scale_ppsv2.pdf  PHYSICAL EXAM:  Vital Signs Last 24 Hrs  T(C): 36.6 (25 Mar 2025 08:50), Max: 36.8 (25 Mar 2025 00:19)  T(F): 97.9 (25 Mar 2025 08:50), Max: 98.2 (25 Mar 2025 00:19)  HR: 92 (25 Mar 2025 08:50) (73 - 99)  BP: 138/69 (25 Mar 2025 08:50) (109/68 - 138/69)  BP(mean): --  RR: 18 (25 Mar 2025 08:50) (18 - 18)  SpO2: 95% (25 Mar 2025 08:50) (92% - 98%)    Parameters below as of 25 Mar 2025 08:50  Patient On (Oxygen Delivery Method): nasal cannula  O2 Flow (L/min): 3   I&O's Summary    24 Mar 2025 07:01  -  25 Mar 2025 07:00  --------------------------------------------------------  IN: 100 mL / OUT: 125 mL / NET: -25 mL    25 Mar 2025 07:01  -  25 Mar 2025 09:46  --------------------------------------------------------  IN: 0 mL / OUT: 0 mL / NET: 0 mL       GENERAL: [ ]Cachexia    [ ]Alert  [ ]Oriented x   [x ]Lethargic  [ ]Unarousable  [ ]Verbal  [x ]Non-Verbal  Behavioral:   [ ]Anxiety  [ ]Delirium [ ]Agitation [ ]Other  HEENT:  [ ]Normal   [x ]Dry mouth   [ ]ET Tube/Trach  [ ]Oral lesions  PULMONARY:   [ ]Clear [ ]Tachypnea  [ ]Audible excessive secretions   [ ]Rhonchi        [ ]Right [ ]Left [ ]Bilateral  [ ]Crackles        [ ]Right [ ]Left [ ]Bilateral  [ ]Wheezing     [ ]Right [ ]Left [ ]Bilateral  [x ]Diminished BS [ ] Right [ ]Left [ ]Bilateral  CARDIOVASCULAR:    [ x]Regular [ ]Irregular [ ]Tachy  [ ]Avinash [ ]Murmur [ ]Other  GASTROINTESTINAL:  [x ]Soft  [ ]Distended   [x ]+BS  [ ]Non tender [ ]Tender  [ ]Other [ ]PEG [ ]OGT/ NGT   Last BM:   GENITOURINARY:  [ ]Normal [x ]Incontinent   [ ]Oliguria/Anuria   [ ]Morales  MUSCULOSKELETAL:   [ ]Normal   [x ]Weakness  [x ]Bed/Wheelchair bound [ ]Edema  NEUROLOGIC:   [ ]No focal deficits  [ x] Cognitive impairment  [x ] Dysphagia [ ]Dysarthria [ ] Paresis [ ]Other   SKIN: see rn flowsheet  [ ]Normal  [ ]Rash  [ ]Other  [ ]Pressure ulcer(s) [ ]y [ ]n present on admission    CRITICAL CARE:  [ ]Shock Present  [ ]Septic [ ]Cardiogenic [ ]Neurologic [ ]Hypovolemic  [ ]Vasopressors [ ]Inotropes  [ ]Respiratory failure present [ ]Mechanical Ventilation [ ]Non-invasive ventilatory support [ ]High-Flow   [ ]Acute  [ ]Chronic [ ]Hypoxic  [ ]Hypercarbic [ ]Other  [ ]Other organ failure     LABS:    reviewed        RADIOLOGY & ADDITIONAL STUDIES:    < from: CT Head No Cont (03.19.25 @ 18:27) >  ACC: [id number] EXAM:  CT BRAIN   ORDERED BY: KRZYSZTOF PACHECO     PROCEDURE DATE:  03/19/2025          INTERPRETATION:  CLINICAL INDICATION:  ams    TECHNIQUE: Noncontrast CT examination of the head was performed.  Coronal and sagittal reformats werealso obtained.    COMPARISON: There are no prior studies available for comparison.    FINDINGS:  INTRA-AXIAL: No intracranial mass effect, acute hemorrhage, midline shift   or acute transcortical infarct is seen. Chronic cerebellar infarcts.   There are periventricular white matter hypodensities that are nonspecific   in nature but may reflect chronic ischemic microvascular disease.  EXTRA-AXIAL: No extra-axial fluid collection is present.  VENTRICLES AND SULCI: Parenchymal volume is commensuratewith patient   age. No hydrocephalus.  VISUALIZED SINUSES: Mild mucosal thickening.  VISUALIZED MASTOIDS: Clear.  CALVARIUM: Normal.    IMPRESSION:    No evidence of acute intracranial hemorrhage, midline shift or CT   evidence of acute territorial infarct.    If the patient's symptoms persist, consider short interval follow-up head   CT or brain MRI if there are no MRI contraindications.    --- End of Report ---            SHIRA JONES MD; Attending Radiologist  This document has been electronicallysigned. Mar 19 2025  6:32PM    < end of copied text >      Protein Calorie Malnutrition Present: [ ]mild [ ]moderate [ ]severe [ ]underweight [ ]morbid obesity  https://www.andeal.org/vault/2440/web/files/ONC/Table_Clinical%20Characteristics%20to%20Document%20Malnutrition-White%20JV%20et%20al%202012.pdf    Height (cm): 154.9 (03-18-25 @ 18:31)  Weight (kg): 40.8 (03-18-25 @ 18:31)  BMI (kg/m2): 17 (03-18-25 @ 18:31)    [ ]PPSV2 < or = 30%  [ ]significant weight loss [ ]poor nutritional intake [ ]anasarca[ ]Artificial Nutrition    Other REFERRALS:  [ ]Hospice  [ ]Child Life  [ ]Social Work  [ ]Case management [ ]Holistic Therapy

## 2025-03-25 NOTE — PROGRESS NOTE ADULT - PROBLEM SELECTOR PLAN 1
-Reportedly with spo2 in 80s by EMS, requiring NRB.  -O2 requirements now improving, tolerating 3LNC   -? aspiration event. No clear infiltrate on CT chest  -Continue Zosyn  -Duoneb q6h  -Start Mucomyst 20% 3ml q6h x3 days (give with duoneb)  -Lowered to 1LNC. Keep sats >90% with O2 PRN. Wean O2 as tolerated, suggest trial room air  -Prognosis guarded, palliative care f/u for GOC.

## 2025-03-25 NOTE — CHART NOTE - NSCHARTNOTEFT_GEN_A_CORE
HR to 150's, pt is more lethargic  /70, T 99.6 (axillary), SpO2 95% on 1 L NC,   -- tachycardia possibly iso fever, Ofirmev ordered, continue monitoring for effectiveness   - Dr James is aware

## 2025-03-25 NOTE — PROGRESS NOTE ADULT - ASSESSMENT
89F PMH HLD, CKD3, legally blind, b/l SNHL, dementia (minimally verbally responsive baseline per ED), urinary retention, nontoxic goiter, GERD, constipation, anxiety, depression, R femur fx, presents from [facility] reportedly for respiratory distress (hypoxemia to SpO2% 80s by EMS) and decreased responsiveness a/w sepsis 2/2 PNA/UTI c/f aspiration    Sepsis due to pneumonia.   - SIRS+ (HR, RR, WBC) with reported packing of food in back of mouth, though CXR w/o consolidation, would be c/f aspiration PNA as source. Additionally +UA unable to verify if sx. S/p CTX in ED  - s/p  zosyn  - CT chest noted  - IVF  - S&S noted  - aspiration precautions.    Acute UTI.   - antibiotic  - ID follow    Acute hypoxemic respiratory failure.   - presume 2/2 aspiration PNA   - wean O2 as tolerates   - TTE eval for HF/RHS noted    Metabolic encephalopathy.   - presume i/s/o infxn above superimposed on baseline dementia     Hypercalcemia.   - s/p IVF     Hypernatremia.   - IVF  - Nephrology follow Dr Rosario    Chronic hypertension.   - not on home antihypertensives, ctm given sepsis.    Stage 3 chronic kidney disease.   - stable compared to baseline 4/29/2024 (1.41), monitor BMP/UOP, dose per GFR, avoid toxins.  - Nephrology evaluation    Abnormal findings on examination of body structure. / Osteoma  - R forehead firm, circumscribed, immobile mass nontender to palpation w/o report of fall from emergency contact/ED.      Dementia.   - baseline nonverbal per emergency contact  - fall/aspiration precautions.    Malnutrition  - HCP refuse NGT/ PEG  - IVF    Decubitus prevention  - turn  - heel wound care    Functional quadriplegia  - supportive care      Need for prophylactic measure.   - HSQ VTE PPx    Palliative team follow. Possible PCU transfer.    DNR    Prognosis poor    d/w ACP    Andres James MD phone [phone]

## 2025-03-25 NOTE — PROGRESS NOTE ADULT - SUBJECTIVE AND OBJECTIVE BOX
Follow-up Pulm Progress Note    ROS unable to be obtained  Breathing nonlabored on 1LNC    Medications:  MEDICATIONS  (STANDING):  albuterol/ipratropium for Nebulization 3 milliLiter(s) Nebulizer every 6 hours  dextrose 5% + sodium chloride 0.45%. 1000 milliLiter(s) (50 mL/Hr) IV Continuous <Continuous>  dextrose 5% + sodium chloride 0.9%. 1000 milliLiter(s) (75 mL/Hr) IV Continuous <Continuous>  heparin   Injectable 5000 Unit(s) SubCutaneous every 12 hours  piperacillin/tazobactam IVPB.. 3.375 Gram(s) IV Intermittent every 12 hours    Vital Signs Last 24 Hrs  T(C): 36.6 (25 Mar 2025 08:50), Max: 36.8 (25 Mar 2025 00:19)  T(F): 97.9 (25 Mar 2025 08:50), Max: 98.2 (25 Mar 2025 00:19)  HR: 94 (25 Mar 2025 11:33) (73 - 99)  BP: 127/75 (25 Mar 2025 11:33) (109/68 - 138/69)  BP(mean): --  RR: 18 (25 Mar 2025 08:50) (18 - 18)  SpO2: 99% (25 Mar 2025 11:33) (92% - 99%)    Parameters below as of 25 Mar 2025 11:33  Patient On (Oxygen Delivery Method): nasal cannula  O2 Flow (L/min): 1            03-24 @ 07:01  -  03-25 @ 07:00  --------------------------------------------------------  IN: 100 mL / OUT: 125 mL / NET: -25 mL          LABS:        CAPILLARY BLOOD GLUCOSE  POCT Blood Glucose.: 70 mg/dL (24 Mar 2025 16:54)      CULTURES: (if applicable)    Culture - Blood (collected 03-21-25 @ 11:47)  Source: Blood Blood-Peripheral  Preliminary Report (03-24-25 @ 16:00):    No growth at 72 Hours    Culture - Blood (collected 03-21-25 @ 11:47)  Source: Blood Blood-Peripheral  Preliminary Report (03-24-25 @ 16:00):    No growth at 72 Hours    Culture - Blood (collected 03-18-25 @ 20:50)  Source: Blood Blood  Final Report (03-24-25 @ 02:00):    No growth at 5 days    Culture - Blood (collected 03-18-25 @ 20:35)  Source: Blood Blood  Gram Stain (03-19-25 @ 22:16):    Growth in aerobic bottle: Gram Positive Cocci in Clusters  Final Report (03-20-25 @ 14:55):    Growth in aerobic bottle: Staphylococcus epidermidis    Isolation of Coagulase negative Staphylococcus from single blood culture    sets may represent    contamination. Contact the Microbiology Department at [phone] if    susceptibility testing is needed.    clinically indicated.    Direct identification is available within approximately 3-5    hours either by Blood Panel Multiplexed PCR or Direct    MALDI-TOF. Details: [email or web address]  Organism: Blood Culture PCR (03-20-25 @ 14:55)  Organism: Blood Culture PCR (03-20-25 @ 14:55)      Method Type: PCR      -  Staphylococcus epidermidis, Methicillin resistant: Detec        Culture - Urine (collected 03-18-25 @ 21:17)  Source: Clean Catch Clean Catch (Midstream)  Final Report (03-21-25 @ 10:18):    >100,000 CFU/ml Escherichia coli    <10,000 CFU/ml Normal Urogenital palma present  Organism: Escherichia coli (03-21-25 @ 10:18)  Organism: Escherichia coli (03-21-25 @ 10:18)      Method Type: DELIA      -  Amoxicillin/Clavulanic Acid: S <=8/4      -  Ampicillin: S <=8 These ampicillin results predict results for amoxicillin      -  Ampicillin/Sulbactam: S <=4/2      -  Aztreonam: S <=4      -  Cefazolin: S <=2 For uncomplicated UTI with K. pneumoniae, E. coli, or P. mirablis: DELIA <=16 is sensitive and DELIA >=32 is resistant. This also predicts results for oral agents cefaclor, cefdinir, cefpodoxime, cefprozil, cefuroxime axetil, cephalexin and locarbef for uncomplicated UTI. Note that some isolates may be susceptible to these agents while testing resistant to cefazolin.      -  Cefepime: S <=2      -  Cefoxitin: S <=8      -  Ceftriaxone: S <=1      -  Cefuroxime: S <=4      -  Ciprofloxacin: S <=0.25      -  Ertapenem: S <=0.5      -  Gentamicin: S <=2      -  Imipenem: S <=1      -  Levofloxacin: S <=0.5      -  Meropenem: S <=1      -  Nitrofurantoin: S <=32 Should not be used to treat pyelonephritis      -  Piperacillin/Tazobactam: S <=8      -  Tobramycin: S <=2      -  Trimethoprim/Sulfamethoxazole: S <=0.5/9.5            Physical Examination:  PULM: b/l rhonchi   CVS: S1, S2 heard    RADIOLOGY REVIEWED    CT chest: < from: CT Chest No Cont (03.19.25 @ 08:55) >    ACC: [id number] EXAM:  CT CHEST   ORDERED BY:  DANITZA SNYDER     PROCEDURE DATE:  03/19/2025          INTERPRETATION:  CLINICAL INFORMATION: Acute hypoxic respiratory failure.   Sepsis.    COMPARISON: None.    CONTRAST/COMPLICATIONS:  IV Contrast: NONE  Oral Contrast: NONE  .    PROCEDURE:  CT of the Chest was performed.  Sagittal and coronal reformats were performed.    FINDINGS:    LUNGS AND LARGE AIRWAYS: There is 5 mm fingerlike projection from right   aspect of upper tracheal wall, in keeping with a polyp versus adherent   mucus. Left lower lobe is low in volume. Mild bronchiectasis in the left   lower lobe with peribronchial wall thickening. There is a 6 mm solid   nodule in right lower lobe, adjacent to juncture of right major and minor   fissures (series 2, image 42). 5 mm groundglass nodule in the right upper   lobe (301-38). Dependent atelectasis.  PLEURA: Trace left-sided pleural effusion..  VESSELS: Aortic calcifications. Coronary artery calcifications. Main   pulmonary artery is mildly dilated measuring up to 3.4 cm.  HEART: Cardiomegaly. No pericardial effusion. There is calcification of   mitral valve annulus.  MEDIASTINUM AND NAYELY: No lymphadenopathy.  CHEST WALL AND LOWER NECK: 1.7 cm hyperattenuating nodule with internal   calcifications in the right thyroid lobe.  VISUALIZED UPPER ABDOMEN: Right hemidiaphragm is mildly elevated. 1.9 cm   left exophytic renal cyst measuring about 20 Hounsfield units on CT   attenuation, indeterminant. There is thickening of left adrenal gland   with no definite nodularity.  BONES: Degenerative changes. Degenerative changes and dextroscoliosis of   the thoracic spine.    IMPRESSION:  Mild bronchiectasis in the left lower lobe with peribronchial wall   thickening which may likely inflammatory.  Pulmonary nodules measuring up to 6 mm in the lung parenchyma. According   to Fleischner Society guideline for management of incidental pulmonary   nodules, follow-up CT chest in 6-12 months is recommended.  5 mm polyp versus adherent mucus in upper trachea. Attention to this area   on follow-up imaging is recommended.  Cardiomegaly.  1.7 cm hypoattenuating nodule with internal calcification the right   thyroid lobe. Recommend nonemergent thyroid ultrasound to further   evaluate.        < end of copied text >

## 2025-03-25 NOTE — PROGRESS NOTE ADULT - ASSESSMENT
89 y/o F with PMH of HLD, CKD3, legally blind, b/l SNHL, dementia (minimally verbally responsive baseline per ED), urinary retention, nontoxic goiter, GERD, constipation, anxiety, depression, R femur fx, presents from [facility] reportedly for respiratory distress (hypoxemia to SpO2% 80s by EMS) and decreased responsiveness. Patient nonverbal and unable to participate in interview. Collateral obtained from emergency contact via phone Rhiannon Santiago (family friend for many years) who reports that patient was recently found to be "packing food and medications in the back of her mouth", otherwise confirms aforementioned hx and states that patient has been nonverbal x 1 mo. Labs on admission notable for leukocytosis, elevated proBNP, UA+. Pulmonary called to consult for hypoxia, r/o PNA.

## 2025-03-25 NOTE — PROGRESS NOTE ADULT - SUBJECTIVE AND OBJECTIVE BOX
Patient is a 89y old  Female who presents with a chief complaint of AHRF, sepsis presume 2/2 PNA, UTI (25 Mar 2025 12:11)      SUBJECTIVE / OVERNIGHT EVENTS: lethargic  Review of Systems  unobtainable     MEDICATIONS  (STANDING):  acetylcysteine 20%  Inhalation 3 milliLiter(s) Inhalation every 6 hours  albuterol/ipratropium for Nebulization 3 milliLiter(s) Nebulizer every 6 hours  dextrose 5% + sodium chloride 0.9%. 1000 milliLiter(s) (75 mL/Hr) IV Continuous <Continuous>  heparin   Injectable 5000 Unit(s) SubCutaneous every 12 hours    MEDICATIONS  (PRN):      Vital Signs Last 24 Hrs  T(C): 37.3 (25 Mar 2025 16:28), Max: 37.3 (25 Mar 2025 16:28)  T(F): 99.1 (25 Mar 2025 16:28), Max: 99.1 (25 Mar 2025 16:28)  HR: 72 (25 Mar 2025 16:28) (72 - 96)  BP: 155/73 (25 Mar 2025 16:28) (111/62 - 155/73)  BP(mean): --  RR: 18 (25 Mar 2025 16:28) (18 - 18)  SpO2: 93% (25 Mar 2025 16:28) (93% - 99%)    Parameters below as of 25 Mar 2025 16:28  Patient On (Oxygen Delivery Method): nasal cannula        PHYSICAL EXAM:  GENERAL: lethargic  HEAD:  Atraumatic, Normocephalic  EYES: EOMI, PERRLA, conjunctiva and sclera clear  NECK: Supple, No JVD  CHEST/LUNG: Clear to auscultation bilaterally; No wheeze  HEART: Regular rate and rhythm; No murmurs, rubs, or gallops  ABDOMEN: Soft, Nontender, Nondistended; Bowel sounds present  EXTREMITIES:  no edema   PSYCH: AAOx)  NEUROLOGY: non-focal  SKIN: No rashes or lesions    LABS:                      RADIOLOGY & ADDITIONAL TESTS:    Imaging Personally Reviewed:    Consultant(s) Notes Reviewed:      Care Discussed with Consultants/Other Providers:

## 2025-03-25 NOTE — PROGRESS NOTE ADULT - CONVERSATION DETAILS
no family at bedside this morning.  Will reach out to Liv at pts husbands request. no family at bedside this morning.  Will reach out to Liv at pts husbands request.    Spoke with Liv for greater then 16 mins discussing ACP and GOC. Discussed in detail the use of fluids and lab work at the end of life.  Liv will speak further today with Asif regarding d/c of fluids and labs. Asif wants abx to continue their course. Discussed that the PCU is an acute care unit and for management of symptoms.  Discussed the need for potential further d/c planning if pt stabilizes.  Liv verbalizes understanding. This writer will f/u with Liv in am for further discuss goc.  If pt becomes symptomatic the goal is to treat symptoms and focus on comfort.  Pt to transfer to PCU as a medical boarder. Palliative will continue to follow. no family at bedside this morning.  Will reach out to Liv at pts husbands request.    Spoke with Liv for greater then 16 mins discussing ACP and GOC. Discussed in detail the use of fluids and lab work at the end of life.  Liv will speak further today with Asif regarding d/c of fluids and labs. Asif wants abx to continue their course. Discussed that the PCU is an acute care unit and for management of symptoms.  Discussed the need for potential further d/c planning if pt stabilizes.  Liv verbalizes understanding. This writer will f/u with Liv in am for further discuss goc.  If pt becomes symptomatic the goal is to treat symptoms and focus on comfort.  Pt to transfer to PCU as a medical boarder. Discussed with primary team NP. Palliative will continue to follow.

## 2025-03-25 NOTE — PHYSICAL THERAPY INITIAL EVALUATION ADULT - PERTINENT HX OF CURRENT PROBLEM, REHAB EVAL
Pt is a 89 year old female with PMH significant for HLD, CKD3, legally blind, b/l SNHL, dementia (minimally verbally responsive baseline per ED), urinary retention, nontoxic goiter, GERD, constipation, anxiety, depression, R femur fx.  Pt presents from [facility] reportedly for respiratory distress (hypoxemia to SpO2% 80s by EMS) and decreased responsiveness. Patient nonverbal and unable to participate in interview. Collateral obtained from emergency contact via phone Rhiannon Santiago (family friend for many years) who reports that patient was recently found to be "packing food and medications in the back of her mouth", otherwise confirms aforementioned hx and states that patient has been nonverbal x 1 mo.  Pt admitted with sepsis 2/2 PNA/UTI c/f aspiration.  Pulmonary called to consult for hypoxia, r/o PNA.   CXR(3/18): Question small left pleural effusion and atelectasis.  CT Chest(3/19): Mild bronchiectasis in the left lower lobe with peribronchial wall thickening which may likely inflammatory. Pulmonary nodules measuring up to 6 mm in the lung parenchyma. According to Fleischner Society guideline for management of incidental pulmonary nodules, follow-up CT chest in 6-12 months is recommended. 5 mm polyp versus adherent mucus in upper trachea. Attention to this area on follow-up imaging is recommended. Cardiomegaly. 1.7 cm hypoattenuating nodule with internal calcification the right thyroid lobe. Recommend nonemergent thyroid ultrasound to further evaluate.  VA Duplex(3/19): No evidence of right lower extremity deep venous thrombosis.  VA Duplex Right(3/19): The right JUAN of 0.97 is near normal.  The right TBI of 0.87 is normal. No arterial vascular occlusive or stenotic lesions are identified along the course of the artery supplying the right lower extremity.  CTH(3/19): No evidence of acute intracranial hemorrhage, midline shift or CT evidence of acute territorial infarct. If the patient's symptoms persist, consider short interval follow-up head CT or brain MRI if there are no MRI contraindications.

## 2025-03-25 NOTE — PHYSICAL THERAPY INITIAL EVALUATION ADULT - GENERAL OBSERVATIONS, REHAB EVAL
Pt rec'd semisupine in bed, +Z flow boots, +IV, +NC x1L, +purewick, in NAD.  Pt cleared for PT session by EMILY Cisneros.

## 2025-03-26 NOTE — PROGRESS NOTE ADULT - ASSESSMENT
89F PMH HLD, CKD3, legally blind, b/l SNHL, dementia (minimally verbally responsive baseline per ED), urinary retention, nontoxic goiter, GERD, constipation, anxiety, depression, R femur fx, presents from [facility] long-term reportedly for respiratory distress (hypoxemia to SpO2% 80s by EMS) and decreased responsiveness a/w sepsis 2/2 PNA/UTI c/f aspiration    Sepsis due to pneumonia.   - s/p  zosyn  - CT chest noted  - s/p IVF  - S&S noted  - aspiration precautions.    Acute UTI.   - s/p antibiotic  - ID follow    Acute hypoxemic respiratory failure.   - presume 2/2 aspiration PNA   - wean O2 as tolerates   - TTE eval for HF/RHS noted    Metabolic encephalopathy.   - presume i/s/o infxn above superimposed on baseline dementia     Hypercalcemia.   - s/p IVF     Hypernatremia.   - IVF  - Nephrology follow Dr Rosario    Chronic hypertension.   - not on home antihypertensives, ctm given sepsis.    Stage 3 chronic kidney disease.   - stable     Abnormal findings on examination of body structure. / Osteoma  - R forehead firm, circumscribed, immobile mass nontender to palpation w/o report of fall from emergency contact/ED.      Dementia.   - baseline nonverbal per emergency contact  - fall/aspiration precautions.    Malnutrition  - HCP refuse NGT/ PEG  - s/p IVF    Decubitus prevention  - turn  - heel wound care    Functional quadriplegia  - supportive care      Need for prophylactic measure.   - HSQ VTE PPx    PCU care    DNR    Comfort care    Andres James MD phone [phone]

## 2025-03-26 NOTE — PROGRESS NOTE ADULT - SUBJECTIVE AND OBJECTIVE BOX
Patient is a 89y old  Female who presents with a chief complaint of AHRF, sepsis presume 2/2 PNA, UTI (26 Mar 2025 08:39)      SUBJECTIVE / OVERNIGHT EVENTS: lethargic  Review of Systems  unobtainable     MEDICATIONS  (STANDING):    MEDICATIONS  (PRN):  acetaminophen  Suppository .. 650 milliGRAM(s) Rectal every 6 hours PRN Temp greater or equal to 38C (100.4F), Mild Pain (1 - 3)  bisacodyl Suppository 10 milliGRAM(s) Rectal daily PRN Constipation  glycopyrrolate Injectable 0.4 milliGRAM(s) IV Push every 6 hours PRN secretions  HYDROmorphone  Injectable 0.2 milliGRAM(s) IV Push every 1 hour PRN Moderate Pain (4 - 6)  HYDROmorphone  Injectable 0.2 milliGRAM(s) IV Push every 1 hour PRN dyspnea  HYDROmorphone  Injectable 0.4 milliGRAM(s) IV Push every 1 hour PRN Severe Pain (7 - 10)  LORazepam   Injectable 0.5 milliGRAM(s) IV Push every 6 hours PRN Agitation      Vital Signs Last 24 Hrs  T(C): 37.2 (26 Mar 2025 09:31), Max: 37.2 (26 Mar 2025 08:11)  T(F): 98.9 (26 Mar 2025 09:31), Max: 98.9 (26 Mar 2025 08:11)  HR: 151 (26 Mar 2025 11:20) (85 - 156)  BP: 124/71 (26 Mar 2025 11:20) (99/73 - 146/80)  BP(mean): --  RR: 18 (26 Mar 2025 09:31) (18 - 18)  SpO2: 93% (26 Mar 2025 09:31) (93% - 97%)    Parameters below as of 26 Mar 2025 09:31  Patient On (Oxygen Delivery Method): nasal cannula  O2 Flow (L/min): 2      PHYSICAL EXAM:  GENERAL: NAD   HEAD:  Atraumatic, Normocephalic  EYES: EOMI, PERRLA, conjunctiva and sclera clear  NECK: Supple, No JVD  CHEST/LUNG: few rhonchi to auscultation bilaterally; No wheeze  HEART: Regular rate and rhythm; No murmurs, rubs, or gallops  ABDOMEN: Soft, Nontender, Nondistended; Bowel sounds present  EXTREMITIES:  2+ Peripheral Pulses, No clubbing, cyanosis, or edema  PSYCH: AAOx0  NEUROLOGY: non-focal  SKIN: No rashes or lesions    LABS:                      RADIOLOGY & ADDITIONAL TESTS:    Imaging Personally Reviewed:    Consultant(s) Notes Reviewed:      Care Discussed with Consultants/Other Providers:

## 2025-03-26 NOTE — PROGRESS NOTE ADULT - SUBJECTIVE AND OBJECTIVE BOX
Time and date of service: 03-26-25 @ 08:37      SUBJECTIVE AND OBJECTIVE: pt sleeping in bed comfortably  Indication for Geriatrics and Palliative Care Services/INTERVAL HPI: goc    OVERNIGHT EVENTS: no acute events overnight    DNR on chart: DNI: Trial NIV  DNI: Trial NIV      Allergies    No Known Allergies    Intolerances    MEDICATIONS  (STANDING):  acetylcysteine 20%  Inhalation 3 milliLiter(s) Inhalation every 6 hours  albuterol/ipratropium for Nebulization 3 milliLiter(s) Nebulizer every 6 hours  dextrose 5% + sodium chloride 0.9%. 1000 milliLiter(s) (75 mL/Hr) IV Continuous <Continuous>  heparin   Injectable 5000 Unit(s) SubCutaneous every 12 hours    MEDICATIONS  (PRN):        ITEMS UNCHECKED ARE NOT PRESENT    PRESENT SYMPTOMS: [x ]Unable to self-report - see [ ] CPOT [ ] PAINADS [ ] RDOS  Source if other than patient:  [ ]Family   [ ]Team     Pain:  [ ]yes [x ]no  QOL impact -   Location -                    Aggravating factors -  Quality -  Radiation -  Timing-  Severity (0-10 scale):  Minimal acceptable level (0-10 scale):     CPOT:    https://www.sccm.org/getattachment/ogj44z06-7n2v-4l5o-3s7o-6397d9550v4x/Critical-Care-Pain-Observation-Tool-(CPOT)    PAIN AD Score:	  http://geriatrictoolkit.[facility]/cog/painad.pdf (Ctrl + left click to view)    Dyspnea:                           [ ]Mild [ ]Moderate [ ]Severe    RDOS:  0 to 2  minimal or no respiratory distress   3  mild distress  4 to 6 moderate distress  >7 severe distress  https://homecareinformation.net/handouts/hen/Respiratory_Distress_Observation_Scale.pdf (Ctrl +  left click to view)     Anxiety:                             [ ]Mild [ ]Moderate [ ]Severe  Fatigue:                             [ ]Mild [ ]Moderate [x ]Severe  Nausea:                            [ ]Mild [ ]Moderate [ ]Severe  Loss of appetite:               [ ]Mild [ ]Moderate [x ]Severe  Constipation:                    [ ]Mild [ ]Moderate [ ]Severe    PCSSQ[Palliative Care Spiritual Screening Question]   Severity (0-10):  Score of 4 or > indicate consideration of Chaplaincy referral.  Chaplaincy Referral: [ ] yes [ ] refused [ ] following [ x] Deferred     Caregiver [place]? : [ ] yes [ ] no [x ] Deferred [ ] Declined             Social work referral [ ] Patient & Family Centered Care Referral [ ]     Anticipatory Grief present?:  [ ] yes [ ] no  [x ] Deferred                  Social work referral [ ] Chaplaincy Referral[ ]      Other Symptoms:  [ ]All other review of systems negative     Palliative Performance Status Version 2:   20      %      http://[facility].org/files/news/palliative_performance_scale_ppsv2.pdf  PHYSICAL EXAM:  Vital Signs Last 24 Hrs  T(C): 37.2 (26 Mar 2025 08:11), Max: 37.3 (25 Mar 2025 16:28)  T(F): 98.9 (26 Mar 2025 08:11), Max: 99.1 (25 Mar 2025 16:28)  HR: 146 (26 Mar 2025 08:11) (72 - 146)  BP: 99/73 (26 Mar 2025 08:11) (99/73 - 155/73)  BP(mean): --  RR: 18 (26 Mar 2025 08:11) (18 - 18)  SpO2: 95% (26 Mar 2025 08:11) (93% - 99%)    Parameters below as of 26 Mar 2025 08:11  Patient On (Oxygen Delivery Method): nasal cannula      25 Mar 2025 07:01  -  25 Mar 2025 09:46  --------------------------------------------------------  IN: 0 mL / OUT: 0 mL / NET: 0 mL       GENERAL: [ ]Cachexia    [ ]Alert  [ ]Oriented x   [x ]Lethargic  [ ]Unarousable  [ ]Verbal  [x ]Non-Verbal  Behavioral:   [ ]Anxiety  [ ]Delirium [ ]Agitation [ ]Other  HEENT:  [ ]Normal   [x ]Dry mouth   [ ]ET Tube/Trach  [ ]Oral lesions  PULMONARY:   [ ]Clear [ ]Tachypnea  [ ]Audible excessive secretions   [ ]Rhonchi        [ ]Right [ ]Left [ ]Bilateral  [ ]Crackles        [ ]Right [ ]Left [ ]Bilateral  [ ]Wheezing     [ ]Right [ ]Left [ ]Bilateral  [x ]Diminished BS [ ] Right [ ]Left [ ]Bilateral  CARDIOVASCULAR:    [ x]Regular [ ]Irregular [ ]Tachy  [ ]Avinash [ ]Murmur [ ]Other  GASTROINTESTINAL:  [x ]Soft  [ ]Distended   [x ]+BS  [ ]Non tender [ ]Tender  [ ]Other [ ]PEG [ ]OGT/ NGT   Last BM:   GENITOURINARY:  [ ]Normal [x ]Incontinent   [ ]Oliguria/Anuria   [ ]Morales  MUSCULOSKELETAL:   [ ]Normal   [x ]Weakness  [x ]Bed/Wheelchair bound [ ]Edema  NEUROLOGIC:   [ ]No focal deficits  [ x] Cognitive impairment  [x ] Dysphagia [ ]Dysarthria [ ] Paresis [ ]Other   SKIN: see rn flowsheet  [ ]Normal  [ ]Rash  [ ]Other  [ ]Pressure ulcer(s) [ ]y [ ]n present on admission    CRITICAL CARE:  [ ]Shock Present  [ ]Septic [ ]Cardiogenic [ ]Neurologic [ ]Hypovolemic  [ ]Vasopressors [ ]Inotropes  [ ]Respiratory failure present [ ]Mechanical Ventilation [ ]Non-invasive ventilatory support [ ]High-Flow   [ ]Acute  [ ]Chronic [ ]Hypoxic  [ ]Hypercarbic [ ]Other  [ ]Other organ failure     LABS:    reviewed        RADIOLOGY & ADDITIONAL STUDIES:    < from: CT Head No Cont (03.19.25 @ 18:27) >  ACC: [id number] EXAM:  CT BRAIN   ORDERED BY: KRZYSZTOF PACHECO     PROCEDURE DATE:  03/19/2025          INTERPRETATION:  CLINICAL INDICATION:  ams    TECHNIQUE: Noncontrast CT examination of the head was performed.  Coronal and sagittal reformats were also obtained.    COMPARISON: There are no prior studies available for comparison.    FINDINGS:  INTRA-AXIAL: No intracranial mass effect, acute hemorrhage, midline shift   or acute transcortical infarct is seen. Chronic cerebellar infarcts.   There are periventricular white matter hypodensities that are nonspecific   in nature but may reflect chronic ischemic microvascular disease.  EXTRA-AXIAL: No extra-axial fluid collection is present.  VENTRICLES AND SULCI: Parenchymal volume is commensurate with patient   age. No hydrocephalus.  VISUALIZED SINUSES: Mild mucosal thickening.  VISUALIZED MASTOIDS: Clear.  CALVARIUM: Normal.    IMPRESSION:    No evidence of acute intracranial hemorrhage, midline shift or CT   evidence of acute territorial infarct.    If the patient's symptoms persist, consider short interval follow-up head   CT or brain MRI if there are no MRI contraindications.    --- End of Report ---            SHIRA JONES MD; Attending Radiologist  This document has been electronically signed. Mar 19 2025  6:32PM    < end of copied text >      Protein Calorie Malnutrition Present: [ ]mild [ ]moderate [ ]severe [ ]underweight [ ]morbid obesity  https://www.andeal.org/vault/2440/web/files/ONC/Table_Clinical%20Characteristics%20to%20Document%20Malnutrition-White%20JV%20et%20al%202012.pdf    Height (cm): 154.9 (03-18-25 @ 18:31)  Weight (kg): 40.8 (03-18-25 @ 18:31)  BMI (kg/m2): 17 (03-18-25 @ 18:31)    [ ]PPSV2 < or = 30%  [ ]significant weight loss [ ]poor nutritional intake [ ]anasarca[ ]Artificial Nutrition    Other REFERRALS:  [ ]Hospice  [ ]Child Life  [ ]Social Work  [ ]Case management [ ]Holistic Therapy      Time and date of service: 03-26-25 @ 08:37      SUBJECTIVE AND OBJECTIVE: pt sleeping in bed comfortably  Indication for Geriatrics and Palliative Care Services/INTERVAL HPI: goc    OVERNIGHT EVENTS: no acute events overnight    DNR on chart: DNI: Trial NIV  DNI: Trial NIV      Allergies    No Known Allergies    Intolerances    MEDICATIONS  (STANDING):  acetylcysteine 20%  Inhalation 3 milliLiter(s) Inhalation every 6 hours  albuterol/ipratropium for Nebulization 3 milliLiter(s) Nebulizer every 6 hours  dextrose 5% + sodium chloride 0.9%. 1000 milliLiter(s) (75 mL/Hr) IV Continuous <Continuous>  heparin   Injectable 5000 Unit(s) SubCutaneous every 12 hours    MEDICATIONS  (PRN):        ITEMS UNCHECKED ARE NOT PRESENT    PRESENT SYMPTOMS: [x ]Unable to self-report - see [ ] CPOT [ ] PAINADS [ ] RDOS  Source if other than patient:  [ ]Family   [ ]Team     Pain:  [ ]yes [x ]no  QOL impact -   Location -                    Aggravating factors -  Quality -  Radiation -  Timing-  Severity (0-10 scale):  Minimal acceptable level (0-10 scale):     CPOT:    https://www.sccm.org/getattachment/aef20n91-4o2y-6h7p-2c1f-9085h7724j4r/Critical-Care-Pain-Observation-Tool-(CPOT)    PAIN AD Score:	  http://geriatrictoolkit.[facility]/cog/painad.pdf (Ctrl + left click to view)    Dyspnea:                           [ ]Mild [ ]Moderate [ ]Severe    RDOS:  0 to 2  minimal or no respiratory distress   3  mild distress  4 to 6 moderate distress  >7 severe distress  https://homecareinformation.net/handouts/hen/Respiratory_Distress_Observation_Scale.pdf (Ctrl +  left click to view)     Anxiety:                             [ ]Mild [ ]Moderate [ ]Severe  Fatigue:                             [ ]Mild [ ]Moderate [x ]Severe  Nausea:                            [ ]Mild [ ]Moderate [ ]Severe  Loss of appetite:               [ ]Mild [ ]Moderate [x ]Severe  Constipation:                    [ ]Mild [ ]Moderate [ ]Severe    PCSSQ[Palliative Care Spiritual Screening Question]   Severity (0-10):  Score of 4 or > indicate consideration of Chaplaincy referral.  Chaplaincy Referral: [ ] yes [ ] refused [ ] following [ x] Deferred     Caregiver [place]? : [ ] yes [ ] no [x ] Deferred [ ] Declined             Social work referral [ ] Patient & Family Centered Care Referral [ ]     Anticipatory Grief present?:  [ ] yes [ ] no  [x ] Deferred                  Social work referral [ ] Chaplaincy Referral[ ]      Other Symptoms:  [ ]All other review of systems negative     Palliative Performance Status Version 2:   10      %      http://[facility].org/files/news/palliative_performance_scale_ppsv2.pdf  PHYSICAL EXAM:  Vital Signs Last 24 Hrs  T(C): 37.2 (26 Mar 2025 08:11), Max: 37.3 (25 Mar 2025 16:28)  T(F): 98.9 (26 Mar 2025 08:11), Max: 99.1 (25 Mar 2025 16:28)  HR: 146 (26 Mar 2025 08:11) (72 - 146)  BP: 99/73 (26 Mar 2025 08:11) (99/73 - 155/73)  BP(mean): --  RR: 18 (26 Mar 2025 08:11) (18 - 18)  SpO2: 95% (26 Mar 2025 08:11) (93% - 99%)    Parameters below as of 26 Mar 2025 08:11  Patient On (Oxygen Delivery Method): nasal cannula      25 Mar 2025 07:01  -  25 Mar 2025 09:46  --------------------------------------------------------  IN: 0 mL / OUT: 0 mL / NET: 0 mL       GENERAL: [ ]Cachexia    [ ]Alert  [ ]Oriented x   [x ]Lethargic  [ ]Unarousable  [ ]Verbal  [x ]Non-Verbal  Behavioral:   [ ]Anxiety  [ ]Delirium [ ]Agitation [ ]Other  HEENT:  [ ]Normal   [x ]Dry mouth   [ ]ET Tube/Trach  [ ]Oral lesions  PULMONARY:   [ ]Clear [ ]Tachypnea  [ ]Audible excessive secretions   [ ]Rhonchi        [ ]Right [ ]Left [ ]Bilateral  [ ]Crackles        [ ]Right [ ]Left [ ]Bilateral  [ ]Wheezing     [ ]Right [ ]Left [ ]Bilateral  [x ]Diminished BS [ ] Right [ ]Left x[ ]Bilateral  CARDIOVASCULAR:    [ x]Regular [ ]Irregular [ ]Tachy  [ ]Avinash [ ]Murmur [ ]Other  GASTROINTESTINAL:  [x ]Soft  [ ]Distended   [x ]+BS  [ ]Non tender [ ]Tender  [ ]Other [ ]PEG [ ]OGT/ NGT   Last BM:   GENITOURINARY:  [ ]Normal [x ]Incontinent   [ ]Oliguria/Anuria   [ ]Morales  MUSCULOSKELETAL:   [ ]Normal   [x ]Weakness  [x ]Bed/Wheelchair bound [ ]Edema  NEUROLOGIC:   [ ]No focal deficits  [ x] Cognitive impairment  [x ] Dysphagia [ ]Dysarthria [ ] Paresis [ ]Other   SKIN: see rn flowsheet  [ ]Normal  [ ]Rash  [ ]Other  [ ]Pressure ulcer(s) [ ]y [ ]n present on admission    CRITICAL CARE:  [ ]Shock Present  [ ]Septic [ ]Cardiogenic [ ]Neurologic [ ]Hypovolemic  [ ]Vasopressors [ ]Inotropes  [ ]Respiratory failure present [ ]Mechanical Ventilation [ ]Non-invasive ventilatory support [ ]High-Flow   [ ]Acute  [ ]Chronic [ ]Hypoxic  [ ]Hypercarbic [ ]Other  [ ]Other organ failure     LABS:    reviewed        RADIOLOGY & ADDITIONAL STUDIES:    < from: CT Head No Cont (03.19.25 @ 18:27) >  ACC: [id number] EXAM:  CT BRAIN   ORDERED BY: KRZYSZTOF PACHECO     PROCEDURE DATE:  03/19/2025          INTERPRETATION:  CLINICAL INDICATION:  ams    TECHNIQUE: Noncontrast CT examination of the head was performed.  Coronal and sagittal reformats were also obtained.    COMPARISON: There are no prior studies available for comparison.    FINDINGS:  INTRA-AXIAL: No intracranial mass effect, acute hemorrhage, midline shift   or acute transcortical infarct is seen. Chronic cerebellar infarcts.   There are periventricular white matter hypodensities that are nonspecific   in nature but may reflect chronic ischemic microvascular disease.  EXTRA-AXIAL: No extra-axial fluid collection is present.  VENTRICLES AND SULCI: Parenchymal volume is commensurate with patient   age. No hydrocephalus.  VISUALIZED SINUSES: Mild mucosal thickening.  VISUALIZED MASTOIDS: Clear.  CALVARIUM: Normal.    IMPRESSION:    No evidence of acute intracranial hemorrhage, midline shift or CT   evidence of acute territorial infarct.    If the patient's symptoms persist, consider short interval follow-up head   CT or brain MRI if there are no MRI contraindications.    --- End of Report ---            SHIRA JONES MD; Attending Radiologist  This document has been electronically signed. Mar 19 2025  6:32PM    < end of copied text >      Protein Calorie Malnutrition Present: [ ]mild [ ]moderate [ ]severe [ ]underweight [ ]morbid obesity  https://www.[facility]al.org/vault/2440/web/files/ONC/Table_Clinical%20Characteristics%20to%20Document%20Malnutrition-White%20JV%20et%20al%202012.pdf    Height (cm): 154.9 (03-18-25 @ 18:31)  Weight (kg): 40.8 (03-18-25 @ 18:31)  BMI (kg/m2): 17 (03-18-25 @ 18:31)    [ ]PPSV2 < or = 30%  [ ]significant weight loss [ ]poor nutritional intake [ ]anasarca[ ]Artificial Nutrition    Other REFERRALS:  [ ]Hospice  [ ]Child Life  [ ]Social Work  [ ]Case management [ ]Holistic Therapy

## 2025-03-26 NOTE — PROGRESS NOTE ADULT - NSPROGADDITIONALINFOA_GEN_ALL_CORE
If acute decompensation and goals are in line with comfort would Recommend:  Dilaudid 0.5 mg IV q2h prn dyspnea or tachypnea  Dilaudid 0.5 mg IV q2h prn severe pain and Dilaudid 0.2 mg IV q2h prn moderate pain  Ativan 0.2 mg IV q2h prn agitation   Glycopyrrolate 0.4 mg IV q6h prn copious oral secretions   Dulcolax suppository daily prn constipation   Acetaminophen suppository 650 mg q6h prn fever  Zofran 4 mg IV q8h prn nausea or vomiting    Recommend discontinuation of all medications and interventions that do not serve goal of promoting patient's comfort and dignity at end-of-life.    Please page for any acute symptoms or further questions or concerns.
If acute decompensation and goals are in line with comfort would Recommend:  Dilaudid 0.5 mg IV q2h prn dyspnea or tachypnea  Dilaudid 0.5 mg IV q2h prn severe pain and Dilaudid 0.2 mg IV q2h prn moderate pain  Ativan 0.2 mg IV q2h prn agitation   Glycopyrrolate 0.4 mg IV q6h prn copious oral secretions   Dulcolax suppository daily prn constipation   Acetaminophen suppository 650 mg q6h prn fever  Zofran 4 mg IV q8h prn nausea or vomiting    Recommend discontinuation of all medications and interventions that do not serve goal of promoting patient's comfort and dignity at end-of-life.    Please page for any acute symptoms or further questions or concerns.

## 2025-03-26 NOTE — CHART NOTE - NSCHARTNOTEFT_GEN_A_CORE
MEDICINE PA NOTE    VANNA DIAMOND    Notified by RN, patient with tachycardia, HR up to 150's. EKG performed, no acute changes. As per Dr. Barry, trial of lopressor 2.5mg IVPx2 given with no improvement in HR. NS 500cc bolus x1 given, HR now 125BMP. Discussed with Dr. Barry, advised to continue to monitor overnight. PCU transfer pending. Will continue to monitor.     Christie Antunez PA-C  Dept of Medicine

## 2025-03-26 NOTE — PROGRESS NOTE ADULT - PROBLEM SELECTOR PLAN 2
completed abx   monitor for symptoms completed abx   monitor for symptoms  now with urinary retention - johns to be placed for comfort.

## 2025-03-26 NOTE — PROGRESS NOTE ADULT - CONVERSATION DETAILS
note to follow Met with Liv at bedside for greater then 16 mins discussing ACP and GOC. Liv has a clear understanding of pts medical conditions and hospital course. Liv had conversations with Asif (pt ) and the goal is to focus on the pts comfort.  Plan to d/c fluids, blood work, non essential meds, and focus on treating symptoms. Plan to transfer to PCU service.  Primary team aware.  PCU team to follow up.

## 2025-03-27 NOTE — PROGRESS NOTE ADULT - TIME BILLING
symptom assessment and management, supportive counseling, coordination of care, discussion and coordination with team.    Lilliam Frias, ANP-BC  Please contact me via Teams  between 6am-2pm. If not answering, please call the palliative care pager [phone]    After 2pm and on weekends, please see the contact information below:    In the event of newly developing, evolving, or worsening symptoms between 2pm and 5pm please contact the Palliative Medicine via extension [phone] for assistance.  After 5pm please contact team via pager (if the patient is at [facility] #[phone] or if the patient is at [facility] #[phone]) The Geriatric and Palliative Medicine service has coverage 24 hours a day/ 7 days a week to provide medical recommendations regarding symptom management needs via telephone
symptom assessment and management, supportive counseling, coordination of care, discussion and coordination with team.    Lilliam Frias, ANP-BC  Please contact me via Teams  between 6am-2pm. If not answering, please call the palliative care pager [phone]    After 2pm and on weekends, please see the contact information below:    In the event of newly developing, evolving, or worsening symptoms between 2pm and 5pm please contact the Palliative Medicine via extension [phone] for assistance.  After 5pm please contact team via pager (if the patient is at [facility] #[phone] or if the patient is at [facility] #[phone]) The Geriatric and Palliative Medicine service has coverage 24 hours a day/ 7 days a week to provide medical recommendations regarding symptom management needs via telephone
minutes spent on total encounter. The necessity of the time spent during the encounter on this date of service was due to:     Total time spent including the following  [x ] Physical chart review and documentation   An extensive review of the physical chart, electronic health record, and documentation was conducted to obtain collateral information including but not limited to:   - Current inpatient records (ED, H&P, primary team, and consultants [IR])   - Inpatient values/results (CBC, CMP, CT)   - Prior inpatient records   - Current or proposed treatment plans   - Pharmacotherapy review   [ x]discussion with the interdisciplinary palliative care team -RN, , clinicians, trainees,   [ x]discussion with the patient/family/decision maker  [x ]Physical Exam and/or review of systems   [x ]Formulation of assessment and plan   [x ]Evaluating for response to treatment and side effects of opioids or benzodiazepines.  [x ]Review of care coordination documentation.

## 2025-03-27 NOTE — PROGRESS NOTE ADULT - PROBLEM SELECTOR PLAN 4
- patient will remain on the PCU  - continue to provide supportive care and symptom directed care - PPSV 10%  - patient requires full support with all ADLs

## 2025-03-27 NOTE — PROGRESS NOTE ADULT - ASSESSMENT
89F PMH HLD, CKD3, legally blind, b/l SNHL, dementia (minimally verbally responsive baseline per ED), urinary retention, nontoxic goiter, GERD, constipation, anxiety, depression, R femur fx, presents from [facility] reportedly for respiratory distress (hypoxemia to SpO2% 80s by EMS) and decreased responsiveness a/w sepsis 2/2 PNA/UTI c/f aspiration    Sepsis due to pneumonia.   - s/p  zosyn  - s/p IVF     Acute UTI.   - s/p antibiotic    Acute hypoxemic respiratory failure.    - wean O2   - comfort care    Metabolic encephalopathy.   - comfort care    Hypercalcemia.   - s/p IVF     Hypernatremia.   - s/p IVF    Chronic hypertension.   - comfort care    Stage 3 chronic kidney disease.   - stable     Abnormal findings on examination of body structure. / Osteoma  - R forehead firm, circumscribed, immobile mass nontender to palpation w/o report of fall from emergency contact/ED.      Dementia.   - baseline nonverbal per emergency contact    Malnutrition  - s/p IVF    Decubitus prevention  - comfort care    Functional quadriplegia  - supportive care      PCU care    DNR    Comfort care    Andres James MD phone [phone]

## 2025-03-27 NOTE — PROGRESS NOTE ADULT - SUBJECTIVE AND OBJECTIVE BOX
GAP TEAM PALLIATIVE CARE UNIT PROGRESS NOTE:      [  ] Patient on hospice program.    INDICATION FOR PALLIATIVE CARE UNIT SERVICES/Interval HPI: 89F PMH HLD, CKD3, legally blind, b/l SNHL, dementia (minimally verbally responsive baseline per ED), urinary retention, nontoxic goiter, GERD, constipation, anxiety, depression, R femur fx, presents from [facility] reportedly for respiratory distress (hypoxemia to SpO2% 80s by EMS) and decreased responsiveness a/w sepsis 2/2 PNA/UTI c/f aspiration. Palliative care called for goc ISO of advanced illness.     OVERNIGHT EVENTS: Patient seen and examined at the bedside. Chart reviewed. Overnight patient required Dilaudid 0.4mg IVP q1hr PRN x1 for dyspnea     DNR on chart: DNI: Trial NIV  DNI: Trial NIV      Allergies    No Known Allergies    Intolerances    MEDICATIONS  (STANDING):    MEDICATIONS  (PRN):  acetaminophen  Suppository .. 650 milliGRAM(s) Rectal every 6 hours PRN Temp greater or equal to 38C (100.4F), Mild Pain (1 - 3)  bisacodyl Suppository 10 milliGRAM(s) Rectal daily PRN Constipation  glycopyrrolate Injectable 0.4 milliGRAM(s) IV Push every 6 hours PRN secretions  HYDROmorphone  Injectable 0.4 milliGRAM(s) IV Push every 1 hour PRN dyspnea  HYDROmorphone  Injectable 0.2 milliGRAM(s) IV Push every 1 hour PRN Moderate Pain (4 - 6)  HYDROmorphone  Injectable 0.4 milliGRAM(s) IV Push every 1 hour PRN Severe Pain (7 - 10)  LORazepam   Injectable 0.5 milliGRAM(s) IV Push every 6 hours PRN Agitation    ITEMS UNCHECKED ARE NOT PRESENT    PRESENT SYMPTOMS: [x ]Unable to self-report see PAINAD, RDOS below  Source if other than patient:  [ ]Family   [ ]Team     Pain: [ ] yes [ ] no  QOL impact -   Location -                    Aggravating factors -  Quality -  Radiation -  Timing-  Severity (0-10 scale):  Minimal acceptable level (0-10 scale):     Dyspnea:                           [x ]Mild [ ]Moderate [ ]Severe  Anxiety:                             [ ]Mild [ ]Moderate [ ]Severe  Fatigue:                             [ ]Mild [ ]Moderate [ ]Severe  Nausea:                             [ ]Mild [ ]Moderate [ ]Severe  Loss of appetite:              [ ]Mild [ ]Moderate [ ]Severe  Constipation:                    [ ]Mild [ ]Moderate [ ]Severe    PCSSQ [Palliative Care Spiritual Screening Question]   Severity (0-10):  Score of 4 or > indicate consideration of Chaplaincy referral.  Chaplaincy Referral: [ ] yes [ ] refused [ ] following [x ] deferred    Caregiver [place]? : [ ] yes [ ] no [x ] deferred:  Social work referral [ ] Patient & Family Centered Care Referral [ ]   Anticipatory Grief present?:  [ ] yes [ ] no [x ] deferred:  Social work referral [ ] Patient & Family Centered Care Referral [ ]  	  Other Symptoms:  [ ]All other review of systems negative   [x] unable to assess due to patients medical condition     PHYSICAL EXAM:   Vital Signs Last 24 Hrs  T(C): 37.9 (27 Mar 2025 08:12), Max: 37.9 (27 Mar 2025 08:12)  T(F): 100.3 (27 Mar 2025 08:12), Max: 100.3 (27 Mar 2025 08:12)  HR: 165 (27 Mar 2025 08:12) (165 - 165)  BP: 81/55 (27 Mar 2025 08:12) (81/55 - 81/55)  BP(mean): --  RR: 20 (27 Mar 2025 08:12) (20 - 20)  SpO2: 91% (27 Mar 2025 08:12) (91% - 91%)    Parameters below as of 27 Mar 2025 08:12  Patient On (Oxygen Delivery Method): nasal cannula     I&O's Summary    26 Mar 2025 07:01  -  27 Mar 2025 07:00  --------------------------------------------------------  IN: 0 mL / OUT: 550 mL / NET: -550 mL      GENERAL: [ ] Cachexia  [ ]Alert  [ ]Oriented x   [x ]Lethargic  [ ]Unarousable  [ ]Verbal  [x ]Non-Verbal  Behavioral:   [ ] Anxiety  [ ] Delirium [ ] Agitation [ ] Other  HEENT:  [ ]Normal   [x ]Dry mouth   [ ]ET Tube/Trach  [ ]Oral lesions  PULMONARY:   [ ]Clear [ ]Tachypnea  [ ]Audible excessive secretions   [ ]Rhonchi        [ ]Right [ ]Left [ ]Bilateral  [ ]Crackles        [ ]Right [ ]Left [ ]Bilateral  [ ]Wheezing     [ ]Right [ ]Left [ ]Bilateral  [x ]Diminished BS [ ]Right [ ]Left [ x]Bilateral    CARDIOVASCULAR:    [x ]Regular [ ]Irregular [ ]Tachy  [ ]Avinash [ ]Murmur [ ]Other  GASTROINTESTINAL:  [x ]Soft  [ ]Distended   [ x]+BS  [ ]Non tender [ ]Tender  [ ]Other [ ]PEG [ ]OGT/ NGT   Last BM: 3/26   GENITOURINARY:  [ ]Normal [x ] Incontinent   [ ]Oliguria/Anuria   [ ]Morales  MUSCULOSKELETAL:   [ ]Normal   [x ]Weakness  [x ]Bed/Wheelchair bound [ ]Edema  NEUROLOGIC:   [ ]No focal deficits  [x ] Cognitive impairment  [x ] Dysphagia [ ]Dysarthria [ ] Paresis [ ]Other   SKIN: please see nursing documentation which I have reviewed   [ ]Normal  [ ]Rash  [ ]Other  [ ]Pressure ulcer(s)  [ x]y [ ]n  Present on admission  sacral DTI, right heel DTI L knee scab    CRITICAL CARE:  [ ] Shock Present  [ ]Septic [ ]Cardiogenic [ ]Neurologic [ ]Hypovolemic  [ ]  Vasopressors [ ]  Inotropes   [ ] Respiratory failure present [ ] Mechanical Ventilation [ ] Non-invasive ventilatory support [ ] High-Flow  [ ] Acute  [ ] Chronic [ ] Hypoxic  [ ] Hypercarbic [ ] Other  [x ] Other organ failure: skin     LABS: none new     RADIOLOGY & ADDITIONAL STUDIES: none new     PROTEIN CALORIE MALNUTRITION: [ ] mild [ ] moderate [ ] severe  [ ] underweight [ ] morbid obesity    https://www.andeal.org/vault/2440/web/files/ONC/Table_Clinical%20Characteristics%20to%20Document%20Malnutrition-White%20JV%20et%20al%202012.pdf    Height (cm): 154.9 (03-18-25 @ 18:31)  Weight (kg): 40.8 (03-18-25 @ 18:31)  BMI (kg/m2): 17 (03-18-25 @ 18:31)    [x ] PPSV2 < or = 30% [ ] significant weight loss [ ] poor nutritional intake [ ] anasarca   Artificial Nutrition [ ]     Other REFERRALS:    [ ] Hospice  [ ]Child Life  [ ]Social Work  [ ]Case management [ ]Holistic Therapy [ ] Physical Therapy [ ] Dietary     Progress Notes - Care Coordination [C. Provider] (03-26-25 @ 15:49)      Palliative Performance Scale:  http://npcrc.org/files/news/palliative_performance_scale_ppsv2.pdf  (Ctrl +  left click to view)  Respiratory Distress Observation Tool:  https://homecareinformation.net/handouts/hen/Respiratory_Distress_Observation_Scale.pdf (Ctrl +  left click to view)  PAINAD Score:  http://geriatrictoolkit.[place].[facility]/cog/painad.pdf (Ctrl +  left click to view)   GAP TEAM PALLIATIVE CARE UNIT PROGRESS NOTE:      [  ] Patient on hospice program.    INDICATION FOR PALLIATIVE CARE UNIT SERVICES/Interval HPI: 89F PMH HLD, CKD3, legally blind, b/l SNHL, dementia (minimally verbally responsive baseline per ED), urinary retention, nontoxic goiter, GERD, constipation, anxiety, depression, R femur fx, presents from [facility] reportedly for respiratory distress (hypoxemia to SpO2% 80s by EMS) and decreased responsiveness a/w sepsis 2/2 PNA/UTI c/f aspiration. Palliative care called for goc ISO of advanced illness.     OVERNIGHT EVENTS: Patient seen and examined at the bedside. Chart reviewed. Overnight patient required Dilaudid 0.4mg IVP q1hr PRN x1 for dyspnea     DNR on chart: yes  DNI    Allergies    No Known Allergies    Intolerances    MEDICATIONS  (STANDING):    MEDICATIONS  (PRN):  acetaminophen  Suppository .. 650 milliGRAM(s) Rectal every 6 hours PRN Temp greater or equal to 38C (100.4F), Mild Pain (1 - 3)  bisacodyl Suppository 10 milliGRAM(s) Rectal daily PRN Constipation  glycopyrrolate Injectable 0.4 milliGRAM(s) IV Push every 6 hours PRN secretions  HYDROmorphone  Injectable 0.4 milliGRAM(s) IV Push every 1 hour PRN dyspnea  HYDROmorphone  Injectable 0.2 milliGRAM(s) IV Push every 1 hour PRN Moderate Pain (4 - 6)  HYDROmorphone  Injectable 0.4 milliGRAM(s) IV Push every 1 hour PRN Severe Pain (7 - 10)  LORazepam   Injectable 0.5 milliGRAM(s) IV Push every 6 hours PRN Agitation    ITEMS UNCHECKED ARE NOT PRESENT    PRESENT SYMPTOMS: [x ]Unable to self-report see PAINAD, RDOS below  Source if other than patient:  [ ]Family   [ ]Team     Pain: [ ] yes [ ] no  QOL impact -   Location -                    Aggravating factors -  Quality -  Radiation -  Timing-  Severity (0-10 scale):  Minimal acceptable level (0-10 scale):     Dyspnea:                           [x ]Mild [ ]Moderate [ ]Severe  Anxiety:                             [ ]Mild [ ]Moderate [ ]Severe  Fatigue:                             [ ]Mild [ ]Moderate [ ]Severe  Nausea:                             [ ]Mild [ ]Moderate [ ]Severe  Loss of appetite:              [ ]Mild [ ]Moderate [ ]Severe  Constipation:                    [ ]Mild [ ]Moderate [ ]Severe    PCSSQ [Palliative Care Spiritual Screening Question]   Severity (0-10):  Score of 4 or > indicate consideration of Chaplaincy referral.  Chaplaincy Referral: [ ] yes [ ] refused [ ] following [x ] deferred    Caregiver [place]? : [ ] yes [ ] no [x ] deferred:  Social work referral [ ] Patient & Family Centered Care Referral [ ]   Anticipatory Grief present?:  [ ] yes [ ] no [x ] deferred:  Social work referral [ ] Patient & Family Centered Care Referral [ ]  	  Other Symptoms:  [ ]All other review of systems negative   [x] unable to assess due to patients medical condition     PHYSICAL EXAM:   Vital Signs Last 24 Hrs  T(C): 37.9 (27 Mar 2025 08:12), Max: 37.9 (27 Mar 2025 08:12)  T(F): 100.3 (27 Mar 2025 08:12), Max: 100.3 (27 Mar 2025 08:12)  HR: 165 (27 Mar 2025 08:12) (165 - 165)  BP: 81/55 (27 Mar 2025 08:12) (81/55 - 81/55)  BP(mean): --  RR: 20 (27 Mar 2025 08:12) (20 - 20)  SpO2: 91% (27 Mar 2025 08:12) (91% - 91%)    Parameters below as of 27 Mar 2025 08:12  Patient On (Oxygen Delivery Method): nasal cannula     I&O's Summary    26 Mar 2025 07:01  -  27 Mar 2025 07:00  --------------------------------------------------------  IN: 0 mL / OUT: 550 mL / NET: -550 mL      GENERAL: [ ] Cachexia  [ ]Alert  [ ]Oriented x   [x ]Lethargic  [ ]Unarousable  [ ]Verbal  [x ]Non-Verbal  Behavioral:   [ ] Anxiety  [ ] Delirium [ ] Agitation [ ] Other  HEENT:  [ ]Normal   [x ]Dry mouth   [ ]ET Tube/Trach  [ ]Oral lesions  PULMONARY:   [ ]Clear [ ]Tachypnea  [ ]Audible excessive secretions   [ ]Rhonchi        [ ]Right [ ]Left [ ]Bilateral  [ ]Crackles        [ ]Right [ ]Left [ ]Bilateral  [ ]Wheezing     [ ]Right [ ]Left [ ]Bilateral  [x ]Diminished BS [ ]Right [ ]Left [ x]Bilateral    CARDIOVASCULAR:    [x ]Regular [ ]Irregular [ ]Tachy  [ ]Avinash [ ]Murmur [ ]Other  GASTROINTESTINAL:  [x ]Soft  [ ]Distended   [ x]+BS  [ x]Non tender [ ]Tender  [ ]Other [ ]PEG [ ]OGT/ NGT   Last BM: 3/26   GENITOURINARY:  [x ]Normal [x ] Incontinent   [ ]Oliguria/Anuria   [ ]Morales  MUSCULOSKELETAL:   [ ]Normal   [x ]Weakness  [x ]Bed/Wheelchair bound [ ]Edema  NEUROLOGIC:   [ ]No focal deficits  [x ] Cognitive impairment  [x ] Dysphagia [ ]Dysarthria [ ] Paresis [ ]Other   SKIN: please see nursing documentation which I have reviewed   [ ]Normal  [ ]Rash  [ ]Other  [ ]Pressure ulcer(s)  [ x]y [ ]n  Present on admission  sacral DTI, right heel DTI L knee scab    CRITICAL CARE:  [ ] Shock Present  [ ]Septic [ ]Cardiogenic [ ]Neurologic [ ]Hypovolemic  [ ]  Vasopressors [ ]  Inotropes   [ ] Respiratory failure present [ ] Mechanical Ventilation [ ] Non-invasive ventilatory support [ ] High-Flow  [ ] Acute  [ ] Chronic [ ] Hypoxic  [ ] Hypercarbic [ ] Other  [x ] Other organ failure: skin  brain    LABS:                       10.9   10.77 )-----------( 295      ( 23 Mar 2025 07:27 )             35.2   03-23    141  |  104  |  14  ----------------------------<  87  3.5   |  20[L]  |  1.20          RADIOLOGY & ADDITIONAL STUDIES: < from: CT Head No Cont (03.19.25 @ 18:27) >  IMPRESSION:    No evidence of acute intracranial hemorrhage, midline shift or CT   evidence of acute territorial infarct.    If the patient's symptoms persist, consider short interval follow-up head   CT or brain MRI if there are no MRI contraindications.    --- End of Report ---            SHIRA JONES MD; Attending Radiologist  This document has been electronicallysigned. Mar 19 2025  6:32PM    < end of copied text >  < from: [facility] Duplex Low Ext Arterial, Ltd, Right (03.19.25 @ 13:03) >  Impression:    The right JUAN of 0.97 is near normal.  The right TBI of 0.87 is normal.    No arterial vascular occlusive or stenotic lesions are identified along   the course of the artery supplying the right lower extremity.    --- End of Report ---            EDNA NICHOLAS MD; Attending Interventional Radiologist  This document has been electronically signed. Mar 19 2025  1:20PM    < end of copied text >  < from: [facility] Duplex Lower Ext Vein Scan, Right (03.19.25 @ 11:59) >  IMPRESSION:    No evidence of right lower extremity deep venous thrombosis.            --- End of Report ---            EDNA NICHOLAS MD; Attending Interventional Radiologist  This document has been electronically signed. Mar 19 2025  1:23PM    < end of copied text >  < from: CT Chest No Cont (03.19.25 @ 08:55) >  IMPRESSION:  Mild bronchiectasis in the left lower lobe with peribronchial wall   thickening which may likely inflammatory.  Pulmonary nodules measuring up to 6 mm in the lung parenchyma. According   to Fleischner Society guideline for management of incidental pulmonary   nodules, follow-up CT chest in 6-12 months is recommended.  5 mm polyp versus adherent mucus in upper trachea. Attention to this area   on follow-up imaging is recommended.  Cardiomegaly.  1.7 cm hypoattenuating nodule with internal calcification the right   thyroid lobe. Recommend nonemergent thyroid ultrasound to further   evaluate.        --- End of Report ---          CHICO GAY DO; Resident Radiologist  This document has been electronically signed.  RADHA HOOD MD; Attending Radiologist  This document has been electronically signed. Mar 19 2025  9:48AM    < end of copied text >    PROTEIN CALORIE MALNUTRITION: [ ] mild [ ] moderate [ ] severe  [ ] underweight [ ] morbid obesity    https://www.andeal.org/vault/2440/web/files/ONC/Table_Clinical%20Characteristics%20to%20Document%20Malnutrition-White%20JV%20et%20al%202012.pdf    Height (cm): 154.9 (03-18-25 @ 18:31)  Weight (kg): 40.8 (03-18-25 @ 18:31)  BMI (kg/m2): 17 (03-18-25 @ 18:31)    [x ] PPSV2 < or = 30% [ ] significant weight loss [ ] poor nutritional intake [ ] anasarca   Artificial Nutrition [ ]     Other REFERRALS:    [ ] Hospice  [ ]Child Life  [ ]Social Work  [ ]Case management [ ]Holistic Therapy [ ] Physical Therapy [ ] Dietary     Progress Notes - Care Coordination [C. Provider] (03-26-25 @ 15:49)                                Care Coordination Assessment 201    COGNITIVE/LEARNING 201  Mental Status    Answers: Unable to assess    Ability to make needs known    Answers: Unable to speak (unrelated to language)    ADMISSION HISTORY 201  Admitted From    Answers: Skilled Nursing Facility-Long Term    Services Present on Admission    Answers: None,  Answers: DME; specify WB, HB, SC    FINANCIAL 201  Does patient have financial concerns for discharge?    Answers: None    LIVING ARRANGEMENTS/SUPPORT 201  Housing Environment    Answers: Assisted Living    Living Arrangements    Answers: Lives alone    Notes: Living Arrangements    Notes: has PH aide    Sources of support/caregivers    Answers: Previous hire-agency/days/hours 12hrs/day    CAREGIVER CONTACT 201  Does the patient wish to identify a Caregiver?    Answers: No    EMERGENCY CONTACTS OUTSIDE HOME 201  Emergency Contact    Answers: Emergency Contact Name Rhiannon Santiago,  Answers: Emergency Contact Phone # [phone],  Answers: Emergency Contact Relationship family friend    DISCHARGE PLANNING 201  Potential Discharge Plan and Services    Answers: Home    Anticipated Transportation Needs for Discharge    Answers: Ambulance    Who will accompany patient at discharge?    Answers: Self    SCREENING 201  Social Work Screen and Referral    Answers: None    SUMMARY 201  Initial Clinical Summary    Notes: Chart reviewed and noted. Pt is 89F PMH HLD, CKD3, legally blind, b/l  SNHL, dementia (minimally verbally responsive baseline per ED), urinary  retention, nontoxic goiter, GERD, constipation, anxiety, depression, R femur  fx, presents from [facility] admitted for respiratory distress (hypoxemia to  SpO2% 80s by EMS) and decreased responsiveness.   Pt unable to participate in assessment pt's  Asif ()  [phone] and family friendRhiannon (friend) [phone] were at the bedside  and able to confirm demographics as pt is deaf, blind and A/Ox0. As perpts  family she lives in [facility]. Pt is dependent, bedbound.  She owns a WC, HB and SC, pt has PH aide from 8a- 8P at facility.    Anticipated Discharge Plan and Services    Notes: Plan unclear at this  present time. Pt undergoing testing such as speech  and swallow and CT scan, pending TTE. Ongoing goc discussions happening, CM  remains available to assist with d/c planning.       Electronically signed by:  Sol Chou RN  Electronically signed on:  2025-03-20  16:29    Palliative Performance Scale:  http://npcrc.org/files/news/palliative_performance_scale_ppsv2.pdf  (Ctrl +  left click to view)  Respiratory Distress Observation Tool:  https://homecareinformation.net/handouts/hen/Respiratory_Distress_Observation_Scale.pdf (Ctrl +  left click to view)  PAINAD Score:  http://geriatrictoolkit.[place].[facility]/cog/painad.pdf (Ctrl +  left click to view)

## 2025-03-27 NOTE — PROGRESS NOTE ADULT - PROBLEM SELECTOR PLAN 2
- antibiotics completed   - monitor for symptoms of infection systolic bp in the 80's with tachycardia - expected due to dying process.  Monitor for pain/fever/dyspnea.

## 2025-03-27 NOTE — PROGRESS NOTE ADULT - PROBLEM SELECTOR PLAN 1
- completed antibiotics  - continue to monitor for symptoms of infection HYDROmorphone  Injectable 0.4 milliGRAM(s) IV Push every 1 hour PRN dyspnea, required x 1 past 24 hours.    Bowel regimen while on opioids.  Monitor for constipation.

## 2025-03-27 NOTE — PROGRESS NOTE ADULT - PROBLEM SELECTOR PLAN 3
- PPSV 10%  - patient requires full support with all ADLs End stage, fast 7c+  eligible for hospice services

## 2025-03-27 NOTE — CHART NOTE - NSCHARTNOTEFT_GEN_A_CORE
Interim Note    Per RN staff, pt inappropriate for nutrition follow up at this time.    RD remains available.  Trena Michael, SAVAGEN, CDN (Available via [employer] TEAMS)

## 2025-03-27 NOTE — PROGRESS NOTE ADULT - SUBJECTIVE AND OBJECTIVE BOX
Patient is a 89y old  Female who presents with a chief complaint of AHRF, sepsis presume 2/2 PNA, UTI (27 Mar 2025 12:22)      SUBJECTIVE / OVERNIGHT EVENTS: lethargic  Review of Systems  unobtainable     MEDICATIONS  (STANDING):    MEDICATIONS  (PRN):  acetaminophen  Suppository .. 650 milliGRAM(s) Rectal every 6 hours PRN Temp greater or equal to 38C (100.4F), Mild Pain (1 - 3)  bisacodyl Suppository 10 milliGRAM(s) Rectal daily PRN Constipation  glycopyrrolate Injectable 0.4 milliGRAM(s) IV Push every 6 hours PRN secretions  HYDROmorphone  Injectable 0.4 milliGRAM(s) IV Push every 1 hour PRN dyspnea  HYDROmorphone  Injectable 0.2 milliGRAM(s) IV Push every 1 hour PRN Moderate Pain (4 - 6)  HYDROmorphone  Injectable 0.4 milliGRAM(s) IV Push every 1 hour PRN Severe Pain (7 - 10)  LORazepam   Injectable 0.5 milliGRAM(s) IV Push every 6 hours PRN Agitation      Vital Signs Last 24 Hrs  T(C): 37.9 (27 Mar 2025 08:12), Max: 37.9 (27 Mar 2025 08:12)  T(F): 100.3 (27 Mar 2025 08:12), Max: 100.3 (27 Mar 2025 08:12)  HR: 165 (27 Mar 2025 08:12) (165 - 165)  BP: 81/55 (27 Mar 2025 08:12) (81/55 - 81/55)  BP(mean): --  RR: 20 (27 Mar 2025 08:12) (20 - 20)  SpO2: 91% (27 Mar 2025 08:12) (91% - 91%)    Parameters below as of 27 Mar 2025 08:12  Patient On (Oxygen Delivery Method): nasal cannula        PHYSICAL EXAM:  GENERAL: NAD  HEAD:  Atraumatic, Normocephalic  NECK: Supple, No JVD  CHEST/LUNG: Clear to auscultation bilaterally; No wheeze  HEART: Regular rate and rhythm; No murmurs, rubs, or gallops  ABDOMEN: Soft, Nontender, Nondistended; Bowel sounds present  EXTREMITIES:  2+ Peripheral Pulses, No clubbing, cyanosis, or edema  PSYCH: AAOx0  NEUROLOGY: non-focal  SKIN: No rashes or lesions    LABS:                      RADIOLOGY & ADDITIONAL TESTS:    Imaging Personally Reviewed:    Consultant(s) Notes Reviewed:      Care Discussed with Consultants/Other Providers:

## 2025-03-28 NOTE — PROGRESS NOTE ADULT - PROBLEM SELECTOR PLAN 2
- The patient required PRN Dilaudid 0.4mg IV X3 within a 24hr period 7am-7am.  - Dilaudid 0.5mg IV q6hr ATC ordered.  - Dilaudid 0.5mg IV q1hr PRN for moderate pain.  - Dilaudid 1mg IV q1hr PRN for severe pain.  - Bowel regimen while on opioids.  Monitor for constipation.

## 2025-03-28 NOTE — PROGRESS NOTE ADULT - SUBJECTIVE AND OBJECTIVE BOX
Patient is a 89y old  Female who presents with a chief complaint of AHRF, sepsis presume 2/2 PNA, UTI (28 Mar 2025 09:48)      SUBJECTIVE / OVERNIGHT EVENTS: lethargic, opens eyes.  and daughter at bedside.  Review of Systems  unobtainable     MEDICATIONS  (STANDING):  HYDROmorphone  Injectable 0.5 milliGRAM(s) IV Push every 6 hours    MEDICATIONS  (PRN):  acetaminophen  Suppository .. 650 milliGRAM(s) Rectal every 6 hours PRN Temp greater or equal to 38C (100.4F), Mild Pain (1 - 3)  bisacodyl Suppository 10 milliGRAM(s) Rectal daily PRN Constipation  glycopyrrolate Injectable 0.4 milliGRAM(s) IV Push every 6 hours PRN secretions  HYDROmorphone  Injectable 0.5 milliGRAM(s) IV Push every 1 hour PRN Moderate Pain (4 - 6)  HYDROmorphone  Injectable 1 milliGRAM(s) IV Push every 1 hour PRN Severe Pain (7 - 10)  HYDROmorphone  Injectable 1 milliGRAM(s) IV Push every 1 hour PRN dyspnea  LORazepam   Injectable 0.5 milliGRAM(s) IV Push every 6 hours PRN Agitation      Vital Signs Last 24 Hrs  T(C): 36.7 (28 Mar 2025 08:35), Max: 36.7 (28 Mar 2025 08:35)  T(F): 98 (28 Mar 2025 08:35), Max: 98 (28 Mar 2025 08:35)  HR: 86 (28 Mar 2025 08:35) (86 - 86)  BP: 134/73 (28 Mar 2025 08:35) (134/73 - 134/73)  BP(mean): --  RR: 20 (28 Mar 2025 08:35) (20 - 20)  SpO2: 96% (28 Mar 2025 08:35) (96% - 96%)    Parameters below as of 28 Mar 2025 08:35  Patient On (Oxygen Delivery Method): nasal cannula        PHYSICAL EXAM:  GENERAL: NAD, well-developed  HEAD:  Atraumatic, Normocephalic  EYES: EOMI, PERRLA, conjunctiva and sclera clear  NECK: Supple, No JVD  CHEST/LUNG: Clear to auscultation bilaterally; No wheeze  HEART: Regular rate and rhythm; No murmurs, rubs, or gallops  ABDOMEN: Soft, Nontender, Nondistended; Bowel sounds present  EXTREMITIES:  2+ Peripheral Pulses, No clubbing, cyanosis, or edema  PSYCH: AAOx3  NEUROLOGY: non-focal  SKIN: No rashes or lesions    LABS:                      RADIOLOGY & ADDITIONAL TESTS:    Imaging Personally Reviewed:    Consultant(s) Notes Reviewed:      Care Discussed with Consultants/Other Providers:

## 2025-03-28 NOTE — PROGRESS NOTE ADULT - SUBJECTIVE AND OBJECTIVE BOX
GAP TEAM PALLIATIVE CARE UNIT PROGRESS NOTE:      [  ] Patient on hospice program.    INDICATION FOR PALLIATIVE CARE UNIT SERVICES/Interval HPI: Symptom management in the setting of a 4W dementia (Namenda), dysphagia, HTN, HLD, CAD (s/p stent, on ASA and plavix), TIA, MGUS, neuropathy, DCIS (s/p anastrazole). Admitted s/p fall from home. Found to have AAA, recommended for medical management. Also with functional decline and behavioral disturbance at home.     OVERNIGHT EVENTS: Chart reviewed. The patient is seen and examined at the bedside. She required PRN Ativan 1mg IV X1 for agitation within a 24hr period 7am-7am.    DNR on chart:  DNI: Trial NIV  DNI: Trial NIV        Allergies    No Known Allergies    Intolerances    MEDICATIONS  (STANDING):    MEDICATIONS  (PRN):  acetaminophen  Suppository .. 650 milliGRAM(s) Rectal every 6 hours PRN Temp greater or equal to 38C (100.4F), Mild Pain (1 - 3)  bisacodyl Suppository 10 milliGRAM(s) Rectal daily PRN Constipation  glycopyrrolate Injectable 0.4 milliGRAM(s) IV Push every 6 hours PRN secretions  HYDROmorphone  Injectable 0.4 milliGRAM(s) IV Push every 1 hour PRN dyspnea  HYDROmorphone  Injectable 0.2 milliGRAM(s) IV Push every 1 hour PRN Moderate Pain (4 - 6)  HYDROmorphone  Injectable 0.4 milliGRAM(s) IV Push every 1 hour PRN Severe Pain (7 - 10)  LORazepam   Injectable 0.5 milliGRAM(s) IV Push every 6 hours PRN Agitation    ITEMS UNCHECKED ARE NOT PRESENT    PRESENT SYMPTOMS: [X ]Unable to self-report see PAINAD, RDOS below  Source if other than patient:  [ ]Family   [ X]Team     Pain: [ ] yes [ ] no  QOL impact -   Location -                    Aggravating factors -  Quality -  Radiation -  Timing-  Severity (0-10 scale):  Minimal acceptable level (0-10 scale):       PCSSQ [Palliative Care Spiritual Screening Question]   Severity (0-10):  Score of 4 or > indicate consideration of Chaplaincy referral.  Chaplaincy Referral: [ ] yes [ ] refused [ ] following [X ] deferred    Caregiver [place]? : [X ] yes [ ] no [ ] deferred:  Social work referral [X ] Patient & Family Centered Care Referral [ ]   Anticipatory Grief present?:  [X ] yes [ ] no [ ] deferred:  Social work referral [X ] Patient & Family Centered Care Referral [ ]  	  Other Symptoms:  [ ]All other review of systems negative- Unable to self report.     PHYSICAL EXAM:   Vital Signs Last 24 Hrs  T(C): 36.7 (28 Mar 2025 08:35), Max: 36.7 (28 Mar 2025 08:35)  T(F): 98 (28 Mar 2025 08:35), Max: 98 (28 Mar 2025 08:35)  HR: 86 (28 Mar 2025 08:35) (86 - 86)  BP: 134/73 (28 Mar 2025 08:35) (134/73 - 134/73)  BP(mean): --  RR: 20 (28 Mar 2025 08:35) (20 - 20)  SpO2: 96% (28 Mar 2025 08:35) (96% - 96%)    Parameters below as of 28 Mar 2025 08:35  Patient On (Oxygen Delivery Method): nasal cannula     I&O's Summary    27 Mar 2025 07:01  -  28 Mar 2025 07:00  --------------------------------------------------------  IN: 0 mL / OUT: 300 mL / NET: -300 mL      GENERAL: [ ] Cachexia  [ ]Alert  [ ]Oriented x   [ ]Lethargic  [ X]Unarousable  [ ]Verbal  [X ]Non-Verbal  Behavioral:   [ ] Anxiety  [ ] Delirium [ ] Agitation [ ] Other  HEENT:  [ ]Normal   [X ]Dry mouth   [ ]ET Tube/Trach  [ ]Oral lesions  PULMONARY:   [ ]Clear [ ]Tachypnea  [ ]Audible excessive secretions   [ ]Rhonchi        [ ]Right [ ]Left [ ]Bilateral  [ ]Crackles        [ ]Right [ ]Left [ ]Bilateral  [ ]Wheezing     [ ]Right [ ]Left [ ]Bilateral  [ X]Diminished BS [ ]Right [ ]Left [ X]Bilateral    CARDIOVASCULAR:    [X ]Regular [ ]Irregular [ ]Tachy  [ ]Avinash [ ]Murmur [ ]Other  GASTROINTESTINAL:  [X ]Soft  [ ]Distended   [ X]+BS  [ X]Non tender [ ]Tender  [ ]Other [ ]PEG [ ]OGT/ NGT   Last BM: 3/23  GENITOURINARY:  [ ]Normal [X ] Incontinent   [ ]Oliguria/Anuria   [ ]Morales  MUSCULOSKELETAL:   [ ]Normal   [ ]Weakness  [ ]Bed/Wheelchair bound [ ]Edema  NEUROLOGIC:   [ ]No focal deficits  [X ] Cognitive impairment  [ ] Dysphagia [ ]Dysarthria [ ] Paresis [ ]Other   SKIN:   [ ]Normal  [ ]Rash  [X ]Other- Incontinence associated dermatitis.   [ ]Pressure ulcer(s)  [ ]y [ ]n  Present on admission      CRITICAL CARE:  [ ] Shock Present  [ ]Septic [ ]Cardiogenic [ ]Neurologic [ ]Hypovolemic  [ ]  Vasopressors [ ]  Inotropes   [ ] Respiratory failure present [ ] Mechanical Ventilation [ ] Non-invasive ventilatory support [ ] High-Flow    [ ] Acute  [ ] Chronic [ ] Hypoxic  [ ] Hypercarbic [ ] Other  [ X] Other organ failure- Brain     LABS: None new.      RADIOLOGY & ADDITIONAL STUDIES: None new.      PROTEIN CALORIE MALNUTRITION: [ ] mild [ ] moderate [ ] severe  [ ] underweight [ ] morbid obesity    https://www.andeal.org/vault/2440/web/files/ONC/Table_Clinical%20Characteristics%20to%20Document%20Malnutrition-White%20JV%20et%20al%202012.pdf      [ X] PPSV2 < or = 30% [ ] significant weight loss [ ] poor nutritional intake [ ] anasarca   Artificial Nutrition [ ]     Other REFERRALS:    [ ] Hospice  [ ]Child Life  [ X]Social Work  [ ]Case management [ ]Holistic Therapy [ ] Physical Therapy [ ] Dietary         Palliative Performance Scale:  http://npcrc.org/files/news/palliative_performance_scale_ppsv2.pdf  (Ctrl +  left click to view)  Respiratory Distress Observation Tool:  https://homecareinformation.net/handouts/hen/Respiratory_Distress_Observation_Scale.pdf (Ctrl +  left click to view)  PAINAD Score:  http://geriatrictoolkit.[facility]/cog/painad.pdf (Ctrl +  left click to view)   GAP TEAM PALLIATIVE CARE UNIT PROGRESS NOTE:      [  ] Patient on hospice program.    INDICATION FOR PALLIATIVE CARE UNIT SERVICES/Interval HPI: Symptom management in the setting of a 4W dementia (Namenda), dysphagia, HTN, HLD, CAD (s/p stent, on ASA and plavix), TIA, MGUS, neuropathy, DCIS (s/p anastrazole). Admitted s/p fall from home. Found to have AAA, recommended for medical management. Also with functional decline and behavioral disturbance at home.     OVERNIGHT EVENTS: Chart reviewed. The patient is seen and examined at the bedside. She required PRN Ativan 1mg IV X1 for agitation within a 24hr period 7am-7am.    DNR on chart:  DNI: Trial NIV  DNI: Trial NIV        Allergies    No Known Allergies    Intolerances    MEDICATIONS  (STANDING):    MEDICATIONS  (PRN):  acetaminophen  Suppository .. 650 milliGRAM(s) Rectal every 6 hours PRN Temp greater or equal to 38C (100.4F), Mild Pain (1 - 3)  bisacodyl Suppository 10 milliGRAM(s) Rectal daily PRN Constipation  glycopyrrolate Injectable 0.4 milliGRAM(s) IV Push every 6 hours PRN secretions  HYDROmorphone  Injectable 0.4 milliGRAM(s) IV Push every 1 hour PRN dyspnea  HYDROmorphone  Injectable 0.2 milliGRAM(s) IV Push every 1 hour PRN Moderate Pain (4 - 6)  HYDROmorphone  Injectable 0.4 milliGRAM(s) IV Push every 1 hour PRN Severe Pain (7 - 10)  LORazepam   Injectable 0.5 milliGRAM(s) IV Push every 6 hours PRN Agitation    ITEMS UNCHECKED ARE NOT PRESENT    PRESENT SYMPTOMS: [X ]Unable to self-report see PAINAD, RDOS below  Source if other than patient:  [ ]Family   [ X]Team     Pain: [ ] yes [ ] no  QOL impact -   Location -                    Aggravating factors -  Quality -  Radiation -  Timing-  Severity (0-10 scale):  Minimal acceptable level (0-10 scale):       PCSSQ [Palliative Care Spiritual Screening Question]   Severity (0-10):  Score of 4 or > indicate consideration of Chaplaincy referral.  Chaplaincy Referral: [ ] yes [ ] refused [ ] following [X ] deferred    Caregiver [place]? : [X ] yes [ ] no [ ] deferred:  Social work referral [X ] Patient & Family Centered Care Referral [ ]   Anticipatory Grief present?:  [X ] yes [ ] no [ ] deferred:  Social work referral [X ] Patient & Family Centered Care Referral [ ]  	  Other Symptoms:  [ ]All other review of systems negative- Unable to self report.     PHYSICAL EXAM:   Vital Signs Last 24 Hrs  T(C): 36.7 (28 Mar 2025 08:35), Max: 36.7 (28 Mar 2025 08:35)  T(F): 98 (28 Mar 2025 08:35), Max: 98 (28 Mar 2025 08:35)  HR: 86 (28 Mar 2025 08:35) (86 - 86)  BP: 134/73 (28 Mar 2025 08:35) (134/73 - 134/73)  BP(mean): --  RR: 20 (28 Mar 2025 08:35) (20 - 20)  SpO2: 96% (28 Mar 2025 08:35) (96% - 96%)    Parameters below as of 28 Mar 2025 08:35  Patient On (Oxygen Delivery Method): nasal cannula     I&O's Summary    27 Mar 2025 07:01  -  28 Mar 2025 07:00  --------------------------------------------------------  IN: 0 mL / OUT: 300 mL / NET: -300 mL      GENERAL: [ ] Cachexia  [ ]Alert  [ ]Oriented x   [ ]Lethargic  [ X]Unarousable  [ ]Verbal  [X ]Non-Verbal  Behavioral:   [ ] Anxiety  [ ] Delirium [ ] Agitation [ ] Other  HEENT:  [ ]Normal   [X ]Dry mouth   [ ]ET Tube/Trach  [ ]Oral lesions  PULMONARY:   [ ]Clear [ ]Tachypnea  [ X]Audible excessive secretions   [ ]Rhonchi        [ ]Right [ ]Left [ ]Bilateral  [ ]Crackles        [ ]Right [ ]Left [ ]Bilateral  [ ]Wheezing     [ ]Right [ ]Left [ ]Bilateral  [ ]Diminished BS [ ]Right [ ]Left [ ]Bilateral    CARDIOVASCULAR:    [X ]Regular [ ]Irregular [ ]Tachy  [ ]Avinash [ ]Murmur [ ]Other  GASTROINTESTINAL:  [X ]Soft  [ ]Distended   [ X]+BS  [ X]Non tender [ ]Tender  [ ]Other [ ]PEG [ ]OGT/ NGT   Last BM: 3/23  GENITOURINARY:  [ ]Normal [X ] Incontinent   [ ]Oliguria/Anuria   [ ]Morales  MUSCULOSKELETAL:   [ ]Normal   [ ]Weakness  [ ]Bed/Wheelchair bound [ ]Edema  NEUROLOGIC:   [ ]No focal deficits  [X ] Cognitive impairment  [ ] Dysphagia [ ]Dysarthria [ ] Paresis [ ]Other   SKIN:   [ ]Normal  [ ]Rash  [X ]Other- Incontinence associated dermatitis.   [ ]Pressure ulcer(s)  [ ]y [ ]n  Present on admission      CRITICAL CARE:  [ ] Shock Present  [ ]Septic [ ]Cardiogenic [ ]Neurologic [ ]Hypovolemic  [ ]  Vasopressors [ ]  Inotropes   [ ] Respiratory failure present [ ] Mechanical Ventilation [ ] Non-invasive ventilatory support [ ] High-Flow    [ ] Acute  [ ] Chronic [ ] Hypoxic  [ ] Hypercarbic [ ] Other  [ X] Other organ failure- Brain     LABS: None new.      RADIOLOGY & ADDITIONAL STUDIES: None new.      PROTEIN CALORIE MALNUTRITION: [ ] mild [ ] moderate [ ] severe  [ X] underweight- please see the nutritionist note. [ ] morbid obesity    https://www.andeal.org/vault/2440/web/files/ONC/Table_Clinical%20Characteristics%20to%20Document%20Malnutrition-White%20JV%20et%20al%202012.pdf      [ X] PPSV2 < or = 30% [ ] significant weight loss [ ] poor nutritional intake [ ] anasarca   Artificial Nutrition [ ]     Other REFERRALS:    [ ] Hospice  [ ]Child Life  [ X]Social Work  [ ]Case management [ ]Holistic Therapy [ ] Physical Therapy [ ] Dietary         Palliative Performance Scale:  http://npcrc.org/files/news/palliative_performance_scale_ppsv2.pdf  (Ctrl +  left click to view)  Respiratory Distress Observation Tool:  https://homecareinformation.net/handouts/hen/Respiratory_Distress_Observation_Scale.pdf (Ctrl +  left click to view)  PAINAD Score:  http://geriatrictoolkit.[facility]/cog/painad.pdf (Ctrl +  left click to view)   GAP TEAM PALLIATIVE CARE UNIT PROGRESS NOTE:      [  ] Patient on hospice program.    INDICATION FOR PALLIATIVE CARE UNIT SERVICES/Interval HPI: Symptom management in the setting of a 4W dementia (Namenda), dysphagia, HTN, HLD, CAD (s/p stent, on ASA and plavix), TIA, MGUS, neuropathy, DCIS (s/p anastrazole). Admitted s/p fall from home. Found to have AAA, recommended for medical management. Also with functional decline and behavioral disturbance at home.     OVERNIGHT EVENTS: Chart reviewed. The patient is seen and examined at the bedside. She required PRN Ativan 1mg IV X1 for agitation within a 24hr period 7am-7am.    DNR on chart: yes  DNI: Trial NIV      Allergies    No Known Allergies    Intolerances    MEDICATIONS  (STANDING):    MEDICATIONS  (PRN):  acetaminophen  Suppository .. 650 milliGRAM(s) Rectal every 6 hours PRN Temp greater or equal to 38C (100.4F), Mild Pain (1 - 3)  bisacodyl Suppository 10 milliGRAM(s) Rectal daily PRN Constipation  glycopyrrolate Injectable 0.4 milliGRAM(s) IV Push every 6 hours PRN secretions  HYDROmorphone  Injectable 0.4 milliGRAM(s) IV Push every 1 hour PRN dyspnea  HYDROmorphone  Injectable 0.2 milliGRAM(s) IV Push every 1 hour PRN Moderate Pain (4 - 6)  HYDROmorphone  Injectable 0.4 milliGRAM(s) IV Push every 1 hour PRN Severe Pain (7 - 10)  LORazepam   Injectable 0.5 milliGRAM(s) IV Push every 6 hours PRN Agitation    ITEMS UNCHECKED ARE NOT PRESENT    PRESENT SYMPTOMS: [X ]Unable to self-report see PAINAD, RDOS below  Source if other than patient:  [ ]Family   [ X]Team     Pain: [ ] yes [ ] no  QOL impact -   Location -                    Aggravating factors -  Quality -  Radiation -  Timing-  Severity (0-10 scale):  Minimal acceptable level (0-10 scale):       PCSSQ [Palliative Care Spiritual Screening Question]   Severity (0-10):  Score of 4 or > indicate consideration of Chaplaincy referral.  Chaplaincy Referral: [ ] yes [ ] refused [ ] following [X ] deferred    Caregiver [place]? : [X ] yes [ ] no [ ] deferred:  Social work referral [X ] Patient & Family Centered Care Referral [ ]   Anticipatory Grief present?:  [X ] yes [ ] no [ ] deferred:  Social work referral [X ] Patient & Family Centered Care Referral [ ]  	  Other Symptoms:  [ ]All other review of systems negative- Unable to self report.     PHYSICAL EXAM:   Vital Signs Last 24 Hrs  T(C): 36.7 (28 Mar 2025 08:35), Max: 36.7 (28 Mar 2025 08:35)  T(F): 98 (28 Mar 2025 08:35), Max: 98 (28 Mar 2025 08:35)  HR: 86 (28 Mar 2025 08:35) (86 - 86)  BP: 134/73 (28 Mar 2025 08:35) (134/73 - 134/73)  BP(mean): --  RR: 20 (28 Mar 2025 08:35) (20 - 20)  SpO2: 96% (28 Mar 2025 08:35) (96% - 96%)    Parameters below as of 28 Mar 2025 08:35  Patient On (Oxygen Delivery Method): nasal cannula     I&O's Summary    27 Mar 2025 07:01  -  28 Mar 2025 07:00  --------------------------------------------------------  IN: 0 mL / OUT: 300 mL / NET: -300 mL      GENERAL: [ ] Cachexia  [ ]Alert  [ ]Oriented x   [ ]Lethargic  [ X]Unarousable  [ ]Verbal  [X ]Non-Verbal  Behavioral:   [ ] Anxiety  [ ] Delirium [ ] Agitation [ ] Other  HEENT:  [ ]Normal   [X ]Dry mouth   [ ]ET Tube/Trach  [ ]Oral lesions  PULMONARY:   [ ]Clear [ ]Tachypnea  [ X]Audible excessive secretions   [ ]Rhonchi        [ ]Right [ ]Left [ ]Bilateral  [ ]Crackles        [ ]Right [ ]Left [ ]Bilateral  [ ]Wheezing     [ ]Right [ ]Left [ ]Bilateral  [ ]Diminished BS [ ]Right [ ]Left [ ]Bilateral    CARDIOVASCULAR:    [X ]Regular [ ]Irregular [ ]Tachy  [ ]Avinash [ ]Murmur [ ]Other  GASTROINTESTINAL:  [X ]Soft  [ ]Distended   [ X]+BS  [ X]Non tender [ ]Tender  [ ]Other [ ]PEG [ ]OGT/ NGT   Last BM: 3/23  GENITOURINARY:  [ ]Normal [X ] Incontinent   [ ]Oliguria/Anuria   [ ]Morales  MUSCULOSKELETAL:   [ ]Normal   [ ]Weakness  [ ]Bed/Wheelchair bound [ ]Edema  NEUROLOGIC:   [ ]No focal deficits  [X ] Cognitive impairment  [ ] Dysphagia [ ]Dysarthria [ ] Paresis [ ]Other   SKIN:   [ ]Normal  [ ]Rash  [X ]Other- Incontinence associated dermatitis.   [ ]Pressure ulcer(s)  [ ]y [ ]n  Present on admission      CRITICAL CARE:  [ ] Shock Present  [ ]Septic [ ]Cardiogenic [ ]Neurologic [ ]Hypovolemic  [ ]  Vasopressors [ ]  Inotropes   [ ] Respiratory failure present [ ] Mechanical Ventilation [ ] Non-invasive ventilatory support [ ] High-Flow    [ ] Acute  [ ] Chronic [ ] Hypoxic  [ ] Hypercarbic [ ] Other  [ X] Other organ failure- Brain     LABS: None new.      RADIOLOGY & ADDITIONAL STUDIES: None new.      PROTEIN CALORIE MALNUTRITION: [ ] mild [ ] moderate [ ] severe  [ X] underweight- please see the nutritionist note. [ ] morbid obesity    https://www.andeal.org/vault/2440/web/files/ONC/Table_Clinical%20Characteristics%20to%20Document%20Malnutrition-White%20JV%20et%20al%202012.pdf      [ X] PPSV2 < or = 30% [ ] significant weight loss [ ] poor nutritional intake [ ] anasarca   Artificial Nutrition [ ]     Other REFERRALS:    [ ] Hospice  [ ]Child Life  [ X]Social Work  [ ]Case management [ ]Holistic Therapy [ ] Physical Therapy [ ] Dietary         Palliative Performance Scale:  http://npcrc.org/files/news/palliative_performance_scale_ppsv2.pdf  (Ctrl +  left click to view)  Respiratory Distress Observation Tool:  https://homecareinformation.net/handouts/hen/Respiratory_Distress_Observation_Scale.pdf (Ctrl +  left click to view)  PAINAD Score:  http://geriatrictoolkit.[facility]/cog/painad.pdf (Ctrl +  left click to view)

## 2025-03-28 NOTE — PROGRESS NOTE ADULT - ASSESSMENT
89F PMH HLD, CKD3, legally blind, b/l SNHL, dementia (minimally verbally responsive baseline per ED), urinary retention, nontoxic goiter, GERD, constipation, anxiety, depression, R femur fx, presents from [facility] reportedly for respiratory distress (hypoxemia to SpO2% 80s by EMS) and decreased responsiveness a/w sepsis 2/2 PNA/UTI c/f aspiration    Sepsis due to pneumonia.   - s/p  zosyn  - s/p IVF     Acute UTI.   - s/p antibiotic    Acute hypoxemic respiratory failure.    - wean O2   - comfort care    Metabolic encephalopathy.   - comfort care    Hypercalcemia.   - s/p IVF     Hypernatremia.   - s/p IVF    Chronic hypertension.   - comfort care    Stage 3 chronic kidney disease.   - stable     Abnormal findings on examination of body structure. / Osteoma  - R forehead firm, circumscribed, immobile mass nontender to palpation w/o report of fall from emergency contact/ED.      Dementia.   - baseline nonverbal per emergency contact    Malnutrition  - s/p IVF    Decubitus prevention  - comfort care    Functional quadriplegia  - supportive care      PCU care    DNR    Comfort care    d/w  and daughter    Andres James MD phone [phone]

## 2025-03-28 NOTE — PROGRESS NOTE ADULT - PROBLEM SELECTOR PLAN 1
- Patient with audible secretions.  - Turn and position for postural drainage.  - Continue with Robinul 0.4mg IV q6hr PRN. ( 1 required in a 24hr period 7am-7am)

## 2025-03-28 NOTE — PROGRESS NOTE ADULT - PROBLEM SELECTOR PLAN 4
- PPSV 10% The patient requires nursing support with all ADLs.  - Supportive care.  - Turn and position.  - Continue with good skin care as per hospital protocol

## 2025-03-29 NOTE — PROGRESS NOTE ADULT - PROBLEM SELECTOR PLAN 3
Controlled with parenteral medications.  - Ativan 0.5mg IV q1hr PRN. ( none required in a 24hr period 7am-7am)

## 2025-03-29 NOTE — PROGRESS NOTE ADULT - PROBLEM SELECTOR PLAN 1
Improving  - Turn and position for postural drainage.  - Continue with Robinul 0.4mg IV q6hr PRN. ( none required in a 24hr period 7am-7am)

## 2025-03-29 NOTE — PROGRESS NOTE ADULT - ASSESSMENT
89F PMH HLD, CKD3, legally blind, b/l SNHL, dementia (minimally verbally responsive baseline per ED), urinary retention, nontoxic goiter, GERD, constipation, anxiety, depression, R femur fx, presents from [facility] reportedly for respiratory distress (hypoxemia to SpO2% 80s by EMS) and decreased responsiveness a/w sepsis 2/2 PNA/UTI c/f aspiration    Sepsis due to pneumonia.   - resolved  - s/p  zosyn  - s/p IVF     Acute UTI.   - s/p antibiotic    Acute hypoxemic respiratory failure.    - wean O2   - comfort care    Metabolic encephalopathy.   - comfort care    Hypercalcemia.   - s/p IVF     Hypernatremia.   - s/p IVF    Chronic hypertension.   - comfort care    Stage 3 chronic kidney disease.   - stable     Abnormal findings on examination of body structure. / Osteoma  - R forehead firm, circumscribed, immobile mass nontender to palpation w/o report of fall from emergency contact/ED.      Dementia.   - baseline nonverbal per emergency contact    Malnutrition  - s/p IVF    Decubitus prevention  - comfort care    Functional quadriplegia  - supportive care      PCU care    DNR    Comfort care    Andres James MD phone [phone]

## 2025-03-29 NOTE — PROGRESS NOTE ADULT - SUBJECTIVE AND OBJECTIVE BOX
GAP TEAM PALLIATIVE CARE UNIT PROGRESS NOTE:      [  ] Patient on hospice program.    INDICATION FOR PALLIATIVE CARE UNIT SERVICES/Interval HPI: Symptom management in the setting of a 4W dementia (Namenda), dysphagia, HTN, HLD, CAD (s/p stent, on ASA and plavix), TIA, MGUS, neuropathy, DCIS (s/p anastrazole). Admitted s/p fall from home. Found to have AAA, recommended for medical management. Also with functional decline and behavioral disturbance at home.     OVERNIGHT EVENTS:   Chart reviewed. The patient is seen and examined at the bedside. She did not require any PRNs within a 24hr period 7am-7am since ATC Dilaudid was ordered yesterday. Family friend Rossy at bedside. Patient appears more lethargic.     DNR on chart: yes  DNI: Trial NIV      Allergies    No Known Allergies    Intolerances    MEDICATIONS  (STANDING):  HYDROmorphone  Injectable 0.5 milliGRAM(s) IV Push every 6 hours    MEDICATIONS  (PRN):  acetaminophen  Suppository .. 650 milliGRAM(s) Rectal every 6 hours PRN Temp greater or equal to 38C (100.4F), Mild Pain (1 - 3)  bisacodyl Suppository 10 milliGRAM(s) Rectal daily PRN Constipation  glycopyrrolate Injectable 0.4 milliGRAM(s) IV Push every 6 hours PRN secretions  HYDROmorphone  Injectable 0.5 milliGRAM(s) IV Push every 1 hour PRN Moderate Pain (4 - 6)  HYDROmorphone  Injectable 1 milliGRAM(s) IV Push every 1 hour PRN Severe Pain (7 - 10)  HYDROmorphone  Injectable 1 milliGRAM(s) IV Push every 1 hour PRN dyspnea  LORazepam   Injectable 0.5 milliGRAM(s) IV Push every 6 hours PRN Agitation      ITEMS UNCHECKED ARE NOT PRESENT    PRESENT SYMPTOMS: [X ]Unable to self-report see PAINAD, RDOS below  Source if other than patient:  [ ]Family   [ X]Team     Pain: [ ] yes [ ] no  QOL impact -   Location -                    Aggravating factors -  Quality -  Radiation -  Timing-  Severity (0-10 scale):  Minimal acceptable level (0-10 scale):       PCSSQ [Palliative Care Spiritual Screening Question]   Severity (0-10):  Score of 4 or > indicate consideration of Chaplaincy referral.  Chaplaincy Referral: [ ] yes [ ] refused [ ] following [X ] deferred    Caregiver [place]? : [X ] yes [ ] no [ ] deferred:  Social work referral [X ] Patient & Family Centered Care Referral [ ]   Anticipatory Grief present?:  [X ] yes [ ] no [ ] deferred:  Social work referral [X ] Patient & Family Centered Care Referral [ ]  	  Other Symptoms:  [ ]All other review of systems negative- Unable to self report.     PHYSICAL EXAM:   Vital Signs Last 24 Hrs  T(C): 36.9 (29 Mar 2025 08:40), Max: 36.9 (29 Mar 2025 08:40)  T(F): 98.4 (29 Mar 2025 08:40), Max: 98.4 (29 Mar 2025 08:40)  HR: 88 (29 Mar 2025 08:40) (88 - 88)  BP: 137/81 (29 Mar 2025 08:40) (137/81 - 137/81)  BP(mean): --  RR: 20 (29 Mar 2025 08:40) (20 - 20)  SpO2: 92% (29 Mar 2025 08:40) (92% - 92%)    Parameters below as of 29 Mar 2025 08:40  Patient On (Oxygen Delivery Method): nasal cannula    I&O's Summary    28 Mar 2025 07:01  -  29 Mar 2025 07:00  --------------------------------------------------------  IN: 0 mL / OUT: 200 mL / NET: -200 mL      GENERAL: [ ] Cachexia  [x ]Alert  [ ]Oriented x   [x ]Lethargic  [ ]Unarousable  [ ]Verbal  [X ]Non-Verbal  Behavioral:   [ ] Anxiety  [ ] Delirium [ ] Agitation [ ] Other  HEENT:  [ ]Normal   [X ]Dry mouth   [ ]ET Tube/Trach  [ ]Oral lesions  PULMONARY:   [ ]Clear [ ]Tachypnea  [ X]Audible excessive secretions   [ ]Rhonchi        [ ]Right [ ]Left [ ]Bilateral  [ ]Crackles        [ ]Right [ ]Left [ ]Bilateral  [ ]Wheezing     [ ]Right [ ]Left [ ]Bilateral  [ ]Diminished BS [ ]Right [ ]Left [ ]Bilateral    CARDIOVASCULAR:    [X ]Regular [ ]Irregular [ ]Tachy  [ ]Avinash [ ]Murmur [ ]Other  GASTROINTESTINAL:  [X ]Soft  [ ]Distended   [ X]+BS  [ X]Non tender [ ]Tender  [ ]Other [ ]PEG [ ]OGT/ NGT   Last BM: 3/26  GENITOURINARY:  [ ]Normal [X ] Incontinent   [ ]Oliguria/Anuria   [ ]Morales  MUSCULOSKELETAL:   [ ]Normal   [ ]Weakness  [ ]Bed/Wheelchair bound [ ]Edema  NEUROLOGIC:   [ ]No focal deficits  [X ] Cognitive impairment  [ ] Dysphagia [ ]Dysarthria [ ] Paresis [ ]Other   SKIN:   [ ]Normal  [ ]Rash  [X ]Other- Incontinence associated dermatitis.   [ ]Pressure ulcer(s)  [ ]y [ ]n  Present on admission      CRITICAL CARE:  [ ] Shock Present  [ ]Septic [ ]Cardiogenic [ ]Neurologic [ ]Hypovolemic  [ ]  Vasopressors [ ]  Inotropes   [ ] Respiratory failure present [ ] Mechanical Ventilation [ ] Non-invasive ventilatory support [ ] High-Flow    [ ] Acute  [ ] Chronic [ ] Hypoxic  [ ] Hypercarbic [ ] Other  [ X] Other organ failure- Brain     LABS: None new.      RADIOLOGY & ADDITIONAL STUDIES: None new.      PROTEIN CALORIE MALNUTRITION: [ ] mild [ ] moderate [ ] severe  [ X] underweight- please see the nutritionist note. [ ] morbid obesity    https://www.andeal.org/vault/2440/web/files/ONC/Table_Clinical%20Characteristics%20to%20Document%20Malnutrition-White%20JV%20et%20al%202012.pdf      [ X] PPSV2 < or = 30% [ ] significant weight loss [ ] poor nutritional intake [ ] anasarca   Artificial Nutrition [ ]     Other REFERRALS:    [ ] Hospice  [ ]Child Life  [ X]Social Work  [ ]Case management [ ]Holistic Therapy [ ] Physical Therapy [ ] Dietary         Palliative Performance Scale:  http://npcrc.org/files/news/palliative_performance_scale_ppsv2.pdf  (Ctrl +  left click to view)  Respiratory Distress Observation Tool:  https://homecareinformation.net/handouts/hen/Respiratory_Distress_Observation_Scale.pdf (Ctrl +  left click to view)  PAINAD Score:  http://geriatrictoolkit.[facility]/cog/painad.pdf (Ctrl +  left click to view)

## 2025-03-29 NOTE — PROGRESS NOTE ADULT - PROBLEM SELECTOR PLAN 2
Controlled with parenteral medications.  - Dilaudid 0.5mg IV q6hr ATC  - Dilaudid 0.5mg IV q1hr PRN for moderate pain.  - Dilaudid 1mg IV q1hr PRN for severe pain.  - Bowel regimen while on opioids.  Monitor for constipation.

## 2025-03-30 NOTE — PROGRESS NOTE ADULT - PROBLEM SELECTOR PLAN 1
Improving  - Turn and position for postural drainage.  - Continue with Robinul 0.4mg IV q6hr PRN. ( 1 required in a 24hr period 7am-7am)

## 2025-03-30 NOTE — PROGRESS NOTE ADULT - PROBLEM SELECTOR PLAN 2
Controlled with parenteral medications.  - Dilaudid 0.5mg IV q6hr ATC  - Dilaudid 0.5mg IV q1hr PRN for moderate pain ( none required in a 24hr period 7am-7am)  - Dilaudid 1mg IV q1hr PRN for severe pain ( 1 required in a 24hr period 7am-7am)  - Bowel regimen while on opioids.  Monitor for constipation.

## 2025-03-30 NOTE — PROGRESS NOTE ADULT - ASSESSMENT
89F PMH HLD, CKD3, legally blind, b/l SNHL, dementia (minimally verbally responsive baseline per ED), urinary retention, nontoxic goiter, GERD, constipation, anxiety, depression, R femur fx, presents from [facility] custodial reportedly for respiratory distress (hypoxemia to SpO2% 80s by EMS) and decreased responsiveness a/w sepsis 2/2 PNA/UTI c/f aspiration    Sepsis due to pneumonia.   - resolved  - s/p  zosyn  - s/p IVF     Acute UTI.   - s/p antibiotic    Acute hypoxemic respiratory failure.    - comfort care    Metabolic encephalopathy.   - comfort care    Hypercalcemia.   - s/p IVF     Hypernatremia.   - s/p IVF    Chronic hypertension.   - comfort care    Stage 3 chronic kidney disease.   - stable     Abnormal findings on examination of body structure. / Osteoma  - R forehead firm, circumscribed, immobile mass nontender to palpation w/o report of fall from emergency contact/ED.      Dementia.   - baseline nonverbal per emergency contact    Malnutrition  - s/p IVF    Decubitus prevention  - comfort care    Functional quadriplegia  - supportive care      PCU care    DNR    Comfort care    d/w HCP at bedside    Andres James MD phone [phone]

## 2025-03-30 NOTE — PROGRESS NOTE ADULT - SUBJECTIVE AND OBJECTIVE BOX
GAP TEAM PALLIATIVE CARE UNIT PROGRESS NOTE:      [  ] Patient on hospice program.    INDICATION FOR PALLIATIVE CARE UNIT SERVICES/Interval HPI: Symptom management in the setting of a 4W dementia (Namenda), dysphagia, HTN, HLD, CAD (s/p stent, on ASA and plavix), TIA, MGUS, neuropathy, DCIS (s/p anastrazole). Admitted s/p fall from home. Found to have AAA, recommended for medical management. Also with functional decline and behavioral disturbance at home.     OVERNIGHT EVENTS:   Chart reviewed. The patient is seen and examined at the bedside. She required PRN Glyco x1, Dilaudid 1mg x1 for dyspnea and Ativan 0.5mg x1 within a 24hr period 7am-7am since ATC Dilaudid was ordered yesterday. Patient appears more lethargic and hypotensive today.     DNR on chart: yes  DNI: Trial NIV      Allergies    No Known Allergies    Intolerances    MEDICATIONS  (STANDING):  HYDROmorphone  Injectable 0.5 milliGRAM(s) IV Push every 6 hours    MEDICATIONS  (PRN):  acetaminophen  Suppository .. 650 milliGRAM(s) Rectal every 6 hours PRN Temp greater or equal to 38C (100.4F), Mild Pain (1 - 3)  bisacodyl Suppository 10 milliGRAM(s) Rectal daily PRN Constipation  glycopyrrolate Injectable 0.4 milliGRAM(s) IV Push every 6 hours PRN secretions  HYDROmorphone  Injectable 0.5 milliGRAM(s) IV Push every 1 hour PRN Moderate Pain (4 - 6)  HYDROmorphone  Injectable 1 milliGRAM(s) IV Push every 1 hour PRN Severe Pain (7 - 10)  HYDROmorphone  Injectable 1 milliGRAM(s) IV Push every 1 hour PRN dyspnea  LORazepam   Injectable 0.5 milliGRAM(s) IV Push every 6 hours PRN Agitation      ITEMS UNCHECKED ARE NOT PRESENT    PRESENT SYMPTOMS: [X ]Unable to self-report see PAINAD, RDOS below  Source if other than patient:  [ ]Family   [ X]Team     Pain: [ ] yes [ ] no  QOL impact -   Location -                    Aggravating factors -  Quality -  Radiation -  Timing-  Severity (0-10 scale):  Minimal acceptable level (0-10 scale):       PCSSQ [Palliative Care Spiritual Screening Question]   Severity (0-10):  Score of 4 or > indicate consideration of Chaplaincy referral.  Chaplaincy Referral: [ ] yes [ ] refused [ ] following [X ] deferred    Caregiver [place]? : [X ] yes [ ] no [ ] deferred:  Social work referral [X ] Patient & Family Centered Care Referral [ ]   Anticipatory Grief present?:  [X ] yes [ ] no [ ] deferred:  Social work referral [X ] Patient & Family Centered Care Referral [ ]  	  Other Symptoms:  [ ]All other review of systems negative- Unable to self report.     PHYSICAL EXAM:   Vital Signs Last 24 Hrs  T(C): 37.1 (30 Mar 2025 08:09), Max: 37.1 (30 Mar 2025 08:09)  T(F): 98.8 (30 Mar 2025 08:09), Max: 98.8 (30 Mar 2025 08:09)  HR: 81 (30 Mar 2025 08:09) (81 - 81)  BP: 82/45 (30 Mar 2025 08:09) (82/45 - 82/45)  BP(mean): --  RR: 16 (30 Mar 2025 08:09) (16 - 16)  SpO2: 94% (30 Mar 2025 08:09) (94% - 94%)    Parameters below as of 30 Mar 2025 08:09  Patient On (Oxygen Delivery Method): nasal cannula    I&O's Summary    29 Mar 2025 07:01  -  30 Mar 2025 07:00  --------------------------------------------------------  IN: 0 mL / OUT: 350 mL / NET: -350 mL    GENERAL: [ ] Cachexia  [x ]Alert  [ ]Oriented x   [x ]Lethargic  [ ]Unarousable  [ ]Verbal  [X ]Non-Verbal  Behavioral:   [ ] Anxiety  [ ] Delirium [ ] Agitation [ ] Other  HEENT:  [ ]Normal   [X ]Dry mouth   [ ]ET Tube/Trach  [ ]Oral lesions  PULMONARY:   [ ]Clear [ ]Tachypnea  [ X]Audible excessive secretions   [ ]Rhonchi        [ ]Right [ ]Left [ ]Bilateral  [ ]Crackles        [ ]Right [ ]Left [ ]Bilateral  [ ]Wheezing     [ ]Right [ ]Left [ ]Bilateral  [ ]Diminished BS [ ]Right [ ]Left [ ]Bilateral    CARDIOVASCULAR:    [X ]Regular [ ]Irregular [ ]Tachy  [ ]Avinash [ ]Murmur [ ]Other  GASTROINTESTINAL:  [X ]Soft  [ ]Distended   [ X]+BS  [ X]Non tender [ ]Tender  [ ]Other [ ]PEG [ ]OGT/ NGT   Last BM: 3/26  GENITOURINARY:  [ ]Normal [X ] Incontinent   [ ]Oliguria/Anuria   [ ]Morales  MUSCULOSKELETAL:   [ ]Normal   [ ]Weakness  [ ]Bed/Wheelchair bound [ ]Edema  NEUROLOGIC:   [ ]No focal deficits  [X ] Cognitive impairment  [ ] Dysphagia [ ]Dysarthria [ ] Paresis [ ]Other   SKIN:   [ ]Normal  [ ]Rash  [X ]Other- Incontinence associated dermatitis.   [ ]Pressure ulcer(s)  [ ]y [ ]n  Present on admission      CRITICAL CARE:  [ ] Shock Present  [ ]Septic [ ]Cardiogenic [ ]Neurologic [ ]Hypovolemic  [ ]  Vasopressors [ ]  Inotropes   [ ] Respiratory failure present [ ] Mechanical Ventilation [ ] Non-invasive ventilatory support [ ] High-Flow    [ ] Acute  [ ] Chronic [ ] Hypoxic  [ ] Hypercarbic [ ] Other  [ X] Other organ failure- Brain     LABS: None new.      RADIOLOGY & ADDITIONAL STUDIES: None new.      PROTEIN CALORIE MALNUTRITION: [ ] mild [ ] moderate [ ] severe  [ X] underweight- please see the nutritionist note. [ ] morbid obesity    https://www.andeal.org/vault/2440/web/files/ONC/Table_Clinical%20Characteristics%20to%20Document%20Malnutrition-White%20JV%20et%20al%202012.pdf      [ X] PPSV2 < or = 30% [ ] significant weight loss [ ] poor nutritional intake [ ] anasarca   Artificial Nutrition [ ]     Other REFERRALS:    [ ] Hospice  [ ]Child Life  [ X]Social Work  [ ]Case management [ ]Holistic Therapy [ ] Physical Therapy [ ] Dietary         Palliative Performance Scale:  http://npcrc.org/files/news/palliative_performance_scale_ppsv2.pdf  (Ctrl +  left click to view)  Respiratory Distress Observation Tool:  https://homecareinformation.net/handouts/hen/Respiratory_Distress_Observation_Scale.pdf (Ctrl +  left click to view)  PAINAD Score:  http://geriatrictoolkit.[facility]/cog/painad.pdf (Ctrl +  left click to view)

## 2025-03-30 NOTE — PROGRESS NOTE ADULT - SUBJECTIVE AND OBJECTIVE BOX
Patient is a 89y old  Female who presents with a chief complaint of AHRF, sepsis presume 2/2 PNA, UTI (30 Mar 2025 10:32)      SUBJECTIVE / OVERNIGHT EVENTS: lethargic HCP at bedside.  Review of Systems  unobtainable     MEDICATIONS  (STANDING):  HYDROmorphone  Injectable 0.5 milliGRAM(s) IV Push every 6 hours    MEDICATIONS  (PRN):  acetaminophen  Suppository .. 650 milliGRAM(s) Rectal every 6 hours PRN Temp greater or equal to 38C (100.4F), Mild Pain (1 - 3)  bisacodyl Suppository 10 milliGRAM(s) Rectal daily PRN Constipation  glycopyrrolate Injectable 0.4 milliGRAM(s) IV Push every 6 hours PRN secretions  HYDROmorphone  Injectable 0.5 milliGRAM(s) IV Push every 1 hour PRN Moderate Pain (4 - 6)  HYDROmorphone  Injectable 1 milliGRAM(s) IV Push every 1 hour PRN Severe Pain (7 - 10)  HYDROmorphone  Injectable 1 milliGRAM(s) IV Push every 1 hour PRN dyspnea  LORazepam   Injectable 0.5 milliGRAM(s) IV Push every 6 hours PRN Agitation      Vital Signs Last 24 Hrs  T(C): 37.1 (30 Mar 2025 08:09), Max: 37.1 (30 Mar 2025 08:09)  T(F): 98.8 (30 Mar 2025 08:09), Max: 98.8 (30 Mar 2025 08:09)  HR: 81 (30 Mar 2025 08:09) (81 - 81)  BP: 82/45 (30 Mar 2025 08:09) (82/45 - 82/45)  BP(mean): --  RR: 16 (30 Mar 2025 08:09) (16 - 16)  SpO2: 94% (30 Mar 2025 08:09) (94% - 94%)    Parameters below as of 30 Mar 2025 08:09  Patient On (Oxygen Delivery Method): nasal cannula        PHYSICAL EXAM:  GENERAL: lethargic  HEAD:  Atraumatic, Normocephalic  NECK: Supple, No JVD  CHEST/LUNG: Clear to auscultation bilaterally; No wheeze  HEART: Regular rate and rhythm; No murmurs, rubs, or gallops  ABDOMEN: Soft, Nontender, Nondistended; Bowel sounds present  EXTREMITIES:  no edema  PSYCH: AAOx0  NEUROLOGY: non-focal  SKIN: No rashes or lesions    LABS:                      RADIOLOGY & ADDITIONAL TESTS:    Imaging Personally Reviewed:    Consultant(s) Notes Reviewed:      Care Discussed with Consultants/Other Providers:

## 2025-03-30 NOTE — PROGRESS NOTE ADULT - PROBLEM SELECTOR PLAN 3
Controlled with parenteral medications.  - Ativan 0.5mg IV q1hr PRN. ( 1 required in a 24hr period 7am-7am)

## 2025-03-31 NOTE — PROGRESS NOTE ADULT - SUBJECTIVE AND OBJECTIVE BOX
Patient is a 89y old  Female who presents with a chief complaint of AHRF, sepsis presume 2/2 PNA, UTI (31 Mar 2025 09:18)      SUBJECTIVE / OVERNIGHT EVENTS: lethargic  Review of Systems  unobtainable     MEDICATIONS  (STANDING):  HYDROmorphone  Injectable 0.5 milliGRAM(s) IV Push every 6 hours    MEDICATIONS  (PRN):  acetaminophen  Suppository .. 650 milliGRAM(s) Rectal every 6 hours PRN Temp greater or equal to 38C (100.4F), Mild Pain (1 - 3)  bisacodyl Suppository 10 milliGRAM(s) Rectal daily PRN Constipation  glycopyrrolate Injectable 0.4 milliGRAM(s) IV Push every 6 hours PRN secretions  HYDROmorphone  Injectable 0.5 milliGRAM(s) IV Push every 1 hour PRN Moderate Pain (4 - 6)  HYDROmorphone  Injectable 1 milliGRAM(s) IV Push every 1 hour PRN Severe Pain (7 - 10)  HYDROmorphone  Injectable 1 milliGRAM(s) IV Push every 1 hour PRN dyspnea  LORazepam   Injectable 0.5 milliGRAM(s) IV Push every 6 hours PRN Agitation      Vital Signs Last 24 Hrs  T(C): 37.3 (31 Mar 2025 08:51), Max: 37.3 (31 Mar 2025 08:51)  T(F): 99.1 (31 Mar 2025 08:51), Max: 99.1 (31 Mar 2025 08:51)  HR: 82 (31 Mar 2025 08:51) (82 - 82)  BP: 96/59 (31 Mar 2025 08:51) (96/59 - 96/59)  BP(mean): --  RR: 17 (31 Mar 2025 08:51) (17 - 17)  SpO2: 93% (31 Mar 2025 08:51) (93% - 93%)    Parameters below as of 31 Mar 2025 08:51  Patient On (Oxygen Delivery Method): nasal cannula        PHYSICAL EXAM:  GENERAL: NAD  HEAD:  Atraumatic, Normocephalic  NECK: Supple, No JVD  CHEST/LUNG: Clear to auscultation bilaterally; No wheeze  HEART: Regular rate and rhythm; No murmurs, rubs, or gallops  ABDOMEN: Soft, Nontender, Nondistended; Bowel sounds present  EXTREMITIES:  2+ Peripheral Pulses, No clubbing, cyanosis, or edema  PSYCH: AAOx0  NEUROLOGY: non-focal  SKIN: No rashes or lesions    LABS:                      RADIOLOGY & ADDITIONAL TESTS:    Imaging Personally Reviewed:    Consultant(s) Notes Reviewed:      Care Discussed with Consultants/Other Providers:

## 2025-03-31 NOTE — PROGRESS NOTE ADULT - PROBLEM SELECTOR PLAN 3
Controlled with parenteral medications.  - Dilaudid 0.5mg IV q6hr ATC  - Dilaudid 0.5mg IV q1hr PRN for moderate pain ( None required in a 24hr period 7am-7am)  - Dilaudid 1mg IV q1hr PRN for severe pain ( None required in a 24hr period 7am-7am)  - Bowel regimen while on opioids.  Monitor for constipation.

## 2025-03-31 NOTE — PROGRESS NOTE ADULT - PROBLEM SELECTOR PLAN 2
Controlled with parenteral medications.  - Dilaudid 0.5mg IV q6hr ATC  - Dilaudid 1mg IV q1hr PRN for dyspnea. ( None required in a 24hr period 7am-7am)   - Bowel regimen while on opioids.  Monitor for constipation.

## 2025-03-31 NOTE — PROGRESS NOTE ADULT - SUBJECTIVE AND OBJECTIVE BOX
GAP TEAM PALLIATIVE CARE UNIT PROGRESS NOTE:      [  ] Patient on hospice program.    INDICATION FOR PALLIATIVE CARE UNIT SERVICES/Interval HPI: Symptom management in the setting of a 4W dementia (Namenda), dysphagia, HTN, HLD, CAD (s/p stent, on ASA and plavix), TIA, MGUS, neuropathy, DCIS (s/p anastrazole). Admitted s/p fall from home. Found to have AAA, recommended for medical management. Also with functional decline and behavioral disturbance at home.     OVERNIGHT EVENTS: Chart reviewed. The patient is seen and examined at the bedside. She required PRN Ativan 1mg IV X1 for agitation and PRN Robinul 0.4mg IV X1 for secretions within a 24hr period 7am-7am.    DNR on chart:  DNI: Trial NIV  DNI: Trial NIV        Allergies    No Known Allergies    Intolerances    MEDICATIONS  (STANDING):  HYDROmorphone  Injectable 0.5 milliGRAM(s) IV Push every 6 hours    MEDICATIONS  (PRN):  acetaminophen  Suppository .. 650 milliGRAM(s) Rectal every 6 hours PRN Temp greater or equal to 38C (100.4F), Mild Pain (1 - 3)  bisacodyl Suppository 10 milliGRAM(s) Rectal daily PRN Constipation  glycopyrrolate Injectable 0.4 milliGRAM(s) IV Push every 6 hours PRN secretions  HYDROmorphone  Injectable 0.5 milliGRAM(s) IV Push every 1 hour PRN Moderate Pain (4 - 6)  HYDROmorphone  Injectable 1 milliGRAM(s) IV Push every 1 hour PRN Severe Pain (7 - 10)  HYDROmorphone  Injectable 1 milliGRAM(s) IV Push every 1 hour PRN dyspnea  LORazepam   Injectable 0.5 milliGRAM(s) IV Push every 6 hours PRN Agitation    ITEMS UNCHECKED ARE NOT PRESENT    PRESENT SYMPTOMS: [X ]Unable to self-report see PAINAD, RDOS below  Source if other than patient:  [ ]Family   [ X]Team     Pain: [ ] yes [ ] no  QOL impact -   Location -                    Aggravating factors -  Quality -  Radiation -  Timing-  Severity (0-10 scale):  Minimal acceptable level (0-10 scale):       PCSSQ [Palliative Care Spiritual Screening Question]   Severity (0-10):  Score of 4 or > indicate consideration of Chaplaincy referral.  Chaplaincy Referral: [ ] yes [ ] refused [ ] following [X ] deferred    Caregiver [place]? : [X ] yes [ ] no [ ] deferred:  Social work referral [X ] Patient & Family Centered Care Referral [ ]   Anticipatory Grief present?:  [X ] yes [ ] no [ ] deferred:  Social work referral [X ] Patient & Family Centered Care Referral [ ]  	  Other Symptoms: Unable to self report.    PHYSICAL EXAM:   Vital Signs Last 24 Hrs  T(C): 37.3 (31 Mar 2025 08:51), Max: 37.3 (31 Mar 2025 08:51)  T(F): 99.1 (31 Mar 2025 08:51), Max: 99.1 (31 Mar 2025 08:51)  HR: 82 (31 Mar 2025 08:51) (82 - 82)  BP: 96/59 (31 Mar 2025 08:51) (96/59 - 96/59)  BP(mean): --  RR: 17 (31 Mar 2025 08:51) (17 - 17)  SpO2: 93% (31 Mar 2025 08:51) (93% - 93%)    Parameters below as of 31 Mar 2025 08:51  Patient On (Oxygen Delivery Method): nasal cannula     I&O's Summary    30 Mar 2025 07:01  -  31 Mar 2025 07:00  --------------------------------------------------------  IN: 0 mL / OUT: 200 mL / NET: -200 mL    GENERAL: [ ] Cachexia  [ ]Alert  [ ]Oriented x   [ ]Lethargic  [ X]Unarousable  [ ]Verbal  [X ]Non-Verbal  Behavioral:   [ ] Anxiety  [ ] Delirium [ ] Agitation [ ] Other  HEENT:  [ ]Normal   [X ]Dry mouth   [ ]ET Tube/Trach  [ ]Oral lesions  PULMONARY:   [ ]Clear [ ]Tachypnea  [ X]Audible excessive secretions   [ ]Rhonchi        [ ]Right [ ]Left [ ]Bilateral  [ ]Crackles        [ ]Right [ ]Left [ ]Bilateral  [ ]Wheezing     [ ]Right [ ]Left [ ]Bilateral  [ ]Diminished BS [ ]Right [ ]Left [ ]Bilateral    CARDIOVASCULAR:    [X ]Regular [ ]Irregular [ ]Tachy  [ ]Avinash [ ]Murmur [ ]Other  GASTROINTESTINAL:  [X ]Soft  [ ]Distended   [ X]+BS  [ X]Non tender [ ]Tender  [ ]Other [ ]PEG [ ]OGT/ NGT   Last BM: 3/26  GENITOURINARY:  [ ]Normal [X ] Incontinent   [ ]Oliguria/Anuria   [ ]Morales  MUSCULOSKELETAL:   [ ]Normal   [ X]Weakness  [ X]Bed/Wheelchair bound [ ]Edema  NEUROLOGIC:   [ ]No focal deficits  [X ] Cognitive impairment  [ ] Dysphagia [ ]Dysarthria [ ] Paresis [ ]Other   SKIN:   [ ]Normal  [ ]Rash  [X ]Other- Incontinence associated dermatitis.   [ ]Pressure ulcer(s)  [ ]y [ ]n  Present on admission      CRITICAL CARE:  [ ] Shock Present  [ ]Septic [ ]Cardiogenic [ ]Neurologic [ ]Hypovolemic  [ ]  Vasopressors [ ]  Inotropes   [ ] Respiratory failure present [ ] Mechanical Ventilation [ ] Non-invasive ventilatory support [ ] High-Flow    [ ] Acute  [ ] Chronic [ ] Hypoxic  [ ] Hypercarbic [ ] Other  [ X] Other organ failure- Brain     LABS: None new.    RADIOLOGY & ADDITIONAL STUDIES: None new.    PROTEIN CALORIE MALNUTRITION: [ ] mild [ ] moderate [ ] severe  [X ] underweight- please see the nutritionist note [ ] morbid obesity    https://www.andeal.org/vault/2440/web/files/ONC/Table_Clinical%20Characteristics%20to%20Document%20Malnutrition-White%20JV%20et%20al%202012.pdf      [ X] PPSV2 < or = 30% [ ] significant weight loss [ ] poor nutritional intake [ ] anasarca   Artificial Nutrition [ ]     Other REFERRALS:    [ ] Hospice  [ ]Child Life  [X ]Social Work  [ ]Case management [ ]Holistic Therapy [ ] Physical Therapy [ ] Dietary         Palliative Performance Scale:  http://npcrc.org/files/news/palliative_performance_scale_ppsv2.pdf  (Ctrl +  left click to view)  Respiratory Distress Observation Tool:  https://homecareinformation.net/handouts/hen/Respiratory_Distress_Observation_Scale.pdf (Ctrl +  left click to view)  PAINAD Score:  http://geriatrictoolkit.[facility]/cog/painad.pdf (Ctrl +  left click to view)   GAP TEAM PALLIATIVE CARE UNIT PROGRESS NOTE:      [  ] Patient on hospice program.    INDICATION FOR PALLIATIVE CARE UNIT SERVICES/Interval HPI: Symptom management in the setting of a 4W dementia (Namenda), dysphagia, HTN, HLD, CAD (s/p stent, on ASA and plavix), TIA, MGUS, neuropathy, DCIS (s/p anastrazole). Admitted s/p fall from home. Found to have AAA, recommended for medical management. Also with functional decline and behavioral disturbance at home.     OVERNIGHT EVENTS: Chart reviewed. The patient is seen and examined at the bedside. She required PRN Ativan 1mg IV X1 for agitation and PRN Robinul 0.4mg IV X1 for secretions within a 24hr period 7am-7am.    DNR on chart: yes  DNI  Allergies    No Known Allergies    Intolerances    MEDICATIONS  (STANDING):  HYDROmorphone  Injectable 0.5 milliGRAM(s) IV Push every 6 hours    MEDICATIONS  (PRN):  acetaminophen  Suppository .. 650 milliGRAM(s) Rectal every 6 hours PRN Temp greater or equal to 38C (100.4F), Mild Pain (1 - 3)  bisacodyl Suppository 10 milliGRAM(s) Rectal daily PRN Constipation  glycopyrrolate Injectable 0.4 milliGRAM(s) IV Push every 6 hours PRN secretions  HYDROmorphone  Injectable 0.5 milliGRAM(s) IV Push every 1 hour PRN Moderate Pain (4 - 6)  HYDROmorphone  Injectable 1 milliGRAM(s) IV Push every 1 hour PRN Severe Pain (7 - 10)  HYDROmorphone  Injectable 1 milliGRAM(s) IV Push every 1 hour PRN dyspnea  LORazepam   Injectable 0.5 milliGRAM(s) IV Push every 6 hours PRN Agitation    ITEMS UNCHECKED ARE NOT PRESENT    PRESENT SYMPTOMS: [X ]Unable to self-report see PAINAD, RDOS below  Source if other than patient:  [ ]Family   [ X]Team     Pain: [ ] yes [ ] no  QOL impact -   Location -                    Aggravating factors -  Quality -  Radiation -  Timing-  Severity (0-10 scale):  Minimal acceptable level (0-10 scale):       PCSSQ [Palliative Care Spiritual Screening Question]   Severity (0-10):  Score of 4 or > indicate consideration of Chaplaincy referral.  Chaplaincy Referral: [ ] yes [ ] refused [ ] following [X ] deferred    Caregiver [place]? : [X ] yes [ ] no [ ] deferred:  Social work referral [X ] Patient & Family Centered Care Referral [ ]   Anticipatory Grief present?:  [X ] yes [ ] no [ ] deferred:  Social work referral [X ] Patient & Family Centered Care Referral [ ]  	  Other Symptoms: Unable to self report.    PHYSICAL EXAM:   Vital Signs Last 24 Hrs  T(C): 37.3 (31 Mar 2025 08:51), Max: 37.3 (31 Mar 2025 08:51)  T(F): 99.1 (31 Mar 2025 08:51), Max: 99.1 (31 Mar 2025 08:51)  HR: 82 (31 Mar 2025 08:51) (82 - 82)  BP: 96/59 (31 Mar 2025 08:51) (96/59 - 96/59)  BP(mean): --  RR: 17 (31 Mar 2025 08:51) (17 - 17)  SpO2: 93% (31 Mar 2025 08:51) (93% - 93%)    Parameters below as of 31 Mar 2025 08:51  Patient On (Oxygen Delivery Method): nasal cannula     I&O's Summary    30 Mar 2025 07:01  -  31 Mar 2025 07:00  --------------------------------------------------------  IN: 0 mL / OUT: 200 mL / NET: -200 mL    GENERAL: [ ] Cachexia  [ ]Alert  [ ]Oriented x   [ ]Lethargic  [ X]Unarousable  [ ]Verbal  [X ]Non-Verbal  Behavioral:   [ ] Anxiety  [ ] Delirium [ ] Agitation [ ] Other  HEENT:  [ ]Normal   [X ]Dry mouth   [ ]ET Tube/Trach  [ ]Oral lesions  PULMONARY:   [ ]Clear [ ]Tachypnea  [ X]Audible excessive secretions   [ ]Rhonchi        [ ]Right [ ]Left [ ]Bilateral  [ ]Crackles        [ ]Right [ ]Left [ ]Bilateral  [ ]Wheezing     [ ]Right [ ]Left [ ]Bilateral  [ ]Diminished BS [ ]Right [ ]Left [ ]Bilateral    CARDIOVASCULAR:    [X ]Regular [ ]Irregular [ ]Tachy  [ ]Avinash [ ]Murmur [ ]Other  GASTROINTESTINAL:  [X ]Soft  [ ]Distended   [ X]+BS  [ X]Non tender [ ]Tender  [ ]Other [ ]PEG [ ]OGT/ NGT   Last BM: 3/26  GENITOURINARY:  [ ]Normal [X ] Incontinent   [ ]Oliguria/Anuria   [ ]Morales  MUSCULOSKELETAL:   [ ]Normal   [ X]Weakness  [ X]Bed/Wheelchair bound [ ]Edema  NEUROLOGIC:   [ ]No focal deficits  [X ] Cognitive impairment  [ ] Dysphagia [ ]Dysarthria [ ] Paresis [ ]Other   SKIN:   [ ]Normal  [ ]Rash  [X ]Other- Incontinence associated dermatitis.   [ ]Pressure ulcer(s)  [ ]y [ ]n  Present on admission      CRITICAL CARE:  [ ] Shock Present  [ ]Septic [ ]Cardiogenic [ ]Neurologic [ ]Hypovolemic  [ ]  Vasopressors [ ]  Inotropes   [ ] Respiratory failure present [ ] Mechanical Ventilation [ ] Non-invasive ventilatory support [ ] High-Flow    [ ] Acute  [ ] Chronic [ ] Hypoxic  [ ] Hypercarbic [ ] Other  [ X] Other organ failure- Brain     LABS: None new.    RADIOLOGY & ADDITIONAL STUDIES: None new.    PROTEIN CALORIE MALNUTRITION: [ ] mild [ ] moderate [ ] severe  [X ] underweight- please see the nutritionist note [ ] morbid obesity    https://www.andeal.org/vault/2440/web/files/ONC/Table_Clinical%20Characteristics%20to%20Document%20Malnutrition-White%20JV%20et%20al%538679.pdf      [ X] PPSV2 < or = 30% [ ] significant weight loss [ ] poor nutritional intake [ ] anasarca   Artificial Nutrition [ ]     Other REFERRALS:    [ ] Hospice  [ ]Child Life  [X ]Social Work  [ ]Case management [ ]Holistic Therapy [ ] Physical Therapy [ ] Dietary         Palliative Performance Scale:  http://npcrc.org/files/news/palliative_performance_scale_ppsv2.pdf  (Ctrl +  left click to view)  Respiratory Distress Observation Tool:  https://[email or web address].net/handouts/hen/Respiratory_Distress_Observation_Scale.pdf (Ctrl +  left click to view)  PAINAD Score:  http://geriatrictoolkit.[email or web address]/cog/painad.pdf (Ctrl +  left click to view)

## 2025-03-31 NOTE — PROGRESS NOTE ADULT - ASSESSMENT
89F PMH HLD, CKD3, legally blind, b/l SNHL, dementia (minimally verbally responsive baseline per ED), urinary retention, nontoxic goiter, GERD, constipation, anxiety, depression, R femur fx, presents from [facility] reportedly for respiratory distress (hypoxemia to SpO2% 80s by EMS) and decreased responsiveness a/w sepsis 2/2 PNA/UTI c/f aspiration    Sepsis due to pneumonia.   - resolved  - s/p  zosyn  - s/p IVF     Acute UTI.   - s/p antibiotic    Acute hypoxemic respiratory failure.    - comfort care    Metabolic encephalopathy.   - comfort care    Hypercalcemia.   - s/p IVF     Hypernatremia.   - s/p IVF    Chronic hypertension.   - comfort care    Stage 3 chronic kidney disease.   - stable     Abnormal findings on examination of body structure. / Osteoma  - R forehead firm, circumscribed, immobile mass nontender to palpation w/o report of fall from emergency contact/ED.      Dementia.   - baseline nonverbal per emergency contact    Malnutrition  - s/p IVF    Decubitus prevention  - comfort care    Functional quadriplegia  - supportive care      PCU care    DNR    Comfort care    Andres James MD phone [phone]

## 2025-03-31 NOTE — PROGRESS NOTE ADULT - PROBLEM SELECTOR PLAN 1
- Turn and position for postural drainage.  - Continue with Robinul 0.4mg IV q6hr PRN. ( 1 required in a 24hr period 7am-7am)

## 2025-04-01 NOTE — PROGRESS NOTE ADULT - SUBJECTIVE AND OBJECTIVE BOX
GAP TEAM PALLIATIVE CARE UNIT PROGRESS NOTE:      [  ] Patient on hospice program.    INDICATION FOR PALLIATIVE CARE UNIT SERVICES/Interval HPI: Symptom management in the setting of a 4W dementia (Namenda), dysphagia, HTN, HLD, CAD (s/p stent, on ASA and plavix), TIA, MGUS, neuropathy, DCIS (s/p anastrazole). Admitted s/p fall from home. Found to have AAA, recommended for medical management. Also with functional decline and behavioral disturbance at home.     OVERNIGHT EVENTS: Chart reviewed. The patient is seen and examined at the bedside. She required PRN Dilaudid 1mg IV X1 for severe pain and PRN Robinul 0.4mg IV X1 for secretions within a 24hr period 7am-7am.    DNR on chart:  DNI: Trial NIV  DNI: Trial NIV        Allergies    No Known Allergies    Intolerances    MEDICATIONS  (STANDING):  HYDROmorphone  Injectable 0.5 milliGRAM(s) IV Push every 6 hours    MEDICATIONS  (PRN):  acetaminophen  Suppository .. 650 milliGRAM(s) Rectal every 6 hours PRN Temp greater or equal to 38C (100.4F), Mild Pain (1 - 3)  bisacodyl Suppository 10 milliGRAM(s) Rectal daily PRN Constipation  glycopyrrolate Injectable 0.4 milliGRAM(s) IV Push every 6 hours PRN secretions  HYDROmorphone  Injectable 0.5 milliGRAM(s) IV Push every 1 hour PRN Moderate Pain (4 - 6)  HYDROmorphone  Injectable 1 milliGRAM(s) IV Push every 1 hour PRN Severe Pain (7 - 10)  HYDROmorphone  Injectable 1 milliGRAM(s) IV Push every 1 hour PRN dyspnea  LORazepam   Injectable 0.5 milliGRAM(s) IV Push every 6 hours PRN Agitation    ITEMS UNCHECKED ARE NOT PRESENT    PRESENT SYMPTOMS: [X ]Unable to self-report see PAINAD, RDOS below  Source if other than patient:  [ ]Family   [X ]Team     Pain: [ ] yes [ ] no  QOL impact -   Location -                    Aggravating factors -  Quality -  Radiation -  Timing-  Severity (0-10 scale):  Minimal acceptable level (0-10 scale):       PCSSQ [Palliative Care Spiritual Screening Question]   Severity (0-10):  Score of 4 or > indicate consideration of Chaplaincy referral.  Chaplaincy Referral: [ ] yes [ ] refused [ ] following [X ] deferred    Caregiver [place]? : [X ] yes [ ] no [ ] deferred:  Social work referral [X ] Patient & Family Centered Care Referral [ ]   Anticipatory Grief present?:  [X ] yes [ ] no [ ] deferred:  Social work referral [X ] Patient & Family Centered Care Referral [ ]  	  Other Symptoms: Unable to self report    PHYSICAL EXAM:   Vital Signs Last 24 Hrs  T(C): 36.7 (01 Apr 2025 08:04), Max: 36.7 (01 Apr 2025 08:04)  T(F): 98.1 (01 Apr 2025 08:04), Max: 98.1 (01 Apr 2025 08:04)  HR: 77 (01 Apr 2025 08:04) (77 - 77)  BP: 94/55 (01 Apr 2025 08:04) (94/55 - 94/55)  BP(mean): --  RR: 18 (01 Apr 2025 08:04) (18 - 18)  SpO2: 93% (01 Apr 2025 08:04) (93% - 93%)    Parameters below as of 01 Apr 2025 08:04  Patient On (Oxygen Delivery Method): nasal cannula     I&O's Summary    31 Mar 2025 07:01  -  01 Apr 2025 07:00  --------------------------------------------------------  IN: 0 mL / OUT: 350 mL / NET: -350 mL    GENERAL: [ ] Cachexia  [ ]Alert  [ ]Oriented x   [ ]Lethargic  [ X]Unarousable  [ ]Verbal  [X ]Non-Verbal  Behavioral:   [ ] Anxiety  [ ] Delirium [ ] Agitation [ ] Other  HEENT:  [ ]Normal   [X ]Dry mouth   [ ]ET Tube/Trach  [ ]Oral lesions  PULMONARY:   [ ]Clear [ ]Tachypnea  [ X]Audible excessive secretions   [ ]Rhonchi        [ ]Right [ ]Left [ ]Bilateral  [ ]Crackles        [ ]Right [ ]Left [ ]Bilateral  [ ]Wheezing     [ ]Right [ ]Left [ ]Bilateral  [ ]Diminished BS [ ]Right [ ]Left [ ]Bilateral    CARDIOVASCULAR:    [X ]Regular [ ]Irregular [ ]Tachy  [ ]Avinash [ ]Murmur [ ]Other  GASTROINTESTINAL:  [X ]Soft  [ ]Distended   [ X]+BS  [ X]Non tender [ ]Tender  [ ]Other [ ]PEG [ ]OGT/ NGT   Last BM: 3/26  GENITOURINARY:  [ ]Normal [X ] Incontinent   [ ]Oliguria/Anuria   [ ]Morales  MUSCULOSKELETAL:   [ ]Normal   [ X]Weakness  [ X]Bed/Wheelchair bound [ ]Edema  NEUROLOGIC:   [ ]No focal deficits  [X ] Cognitive impairment  [ ] Dysphagia [ ]Dysarthria [ ] Paresis [ ]Other   SKIN:   [ ]Normal  [ ]Rash  [X ]Other- Incontinence associated dermatitis.   [ X]Pressure ulcer(s)  [ ]y [ ]n  Present on admission  Sacrum and right heel DTI. Please see nursing assessment for further details for which I have reviewed.    CRITICAL CARE:  [ ] Shock Present  [ ]Septic [ ]Cardiogenic [ ]Neurologic [ ]Hypovolemic  [ ]  Vasopressors [ ]  Inotropes   [ ] Respiratory failure present [ ] Mechanical Ventilation [ ] Non-invasive ventilatory support [ ] High-Flow    [ ] Acute  [ ] Chronic [ ] Hypoxic  [ ] Hypercarbic [ ] Other  [ X] Other organ failure- Brain, skin    LABS: None new.    RADIOLOGY & ADDITIONAL STUDIES: None new.    PROTEIN CALORIE MALNUTRITION: [ ] mild [ ] moderate [ ] severe  [X ] underweight- please see the nutritionist note [ ] morbid obesity    https://www.andeal.org/vault/2440/web/files/ONC/Table_Clinical%20Characteristics%20to%20Document%20Malnutrition-White%20JV%20et%20al%202012.pdf  [ X] PPSV2 < or = 30% [ ] significant weight loss [ ] poor nutritional intake [ ] anasarca   Artificial Nutrition [ ]     Other REFERRALS:    [ ] Hospice  [ ]Child Life  [X ]Social Work  [ ]Case management [ ]Holistic Therapy [ ] Physical Therapy [ ] Dietary         Palliative Performance Scale:  http://npcrc.org/files/news/palliative_performance_scale_ppsv2.pdf  (Ctrl +  left click to view)  Respiratory Distress Observation Tool:  https://[email or web address].[email or web address]/handouts/hen/Respiratory_Distress_Observation_Scale.pdf (Ctrl +  left click to view)  PAINAD Score:  http://geriatrictoolkit.[facility]/cog/painad.pdf (Ctrl +  left click to view)   GAP TEAM PALLIATIVE CARE UNIT PROGRESS NOTE:      [  ] Patient on hospice program.    INDICATION FOR PALLIATIVE CARE UNIT SERVICES/Interval HPI: Symptom management in the setting of a 89 f w/ dementia (Namenda), dysphagia, HTN, HLD, CAD (s/p stent, on ASA and plavix), TIA, MGUS, neuropathy, DCIS (s/p anastrazole). Admitted s/p fall from home. Found to have AAA, recommended for medical management. Also with functional decline and behavioral disturbance at home.     OVERNIGHT EVENTS: Chart reviewed. The patient is seen and examined at the bedside. She required PRN Dilaudid 1mg IV X1 for severe pain and PRN Robinul 0.4mg IV X1 for secretions within a 24hr period 7am-7am.    DNR on chart:  yes  DNI      Allergies    No Known Allergies    Intolerances    MEDICATIONS  (STANDING):  HYDROmorphone  Injectable 0.5 milliGRAM(s) IV Push every 6 hours    MEDICATIONS  (PRN):  acetaminophen  Suppository .. 650 milliGRAM(s) Rectal every 6 hours PRN Temp greater or equal to 38C (100.4F), Mild Pain (1 - 3)  bisacodyl Suppository 10 milliGRAM(s) Rectal daily PRN Constipation  glycopyrrolate Injectable 0.4 milliGRAM(s) IV Push every 6 hours PRN secretions  HYDROmorphone  Injectable 0.5 milliGRAM(s) IV Push every 1 hour PRN Moderate Pain (4 - 6)  HYDROmorphone  Injectable 1 milliGRAM(s) IV Push every 1 hour PRN Severe Pain (7 - 10)  HYDROmorphone  Injectable 1 milliGRAM(s) IV Push every 1 hour PRN dyspnea  LORazepam   Injectable 0.5 milliGRAM(s) IV Push every 6 hours PRN Agitation    ITEMS UNCHECKED ARE NOT PRESENT    PRESENT SYMPTOMS: [X ]Unable to self-report see PAINAD, RDOS below  Source if other than patient:  [ ]Family   [X ]Team     Pain: [ ] yes [ ] no  QOL impact -   Location -                    Aggravating factors -  Quality -  Radiation -  Timing-  Severity (0-10 scale):  Minimal acceptable level (0-10 scale):       PCSSQ [Palliative Care Spiritual Screening Question]   Severity (0-10):  Score of 4 or > indicate consideration of Chaplaincy referral.  Chaplaincy Referral: [ ] yes [ ] refused [ ] following [X ] deferred    Caregiver [place]? : [X ] yes [ ] no [ ] deferred:  Social work referral [X ] Patient & Family Centered Care Referral [ ]   Anticipatory Grief present?:  [X ] yes [ ] no [ ] deferred:  Social work referral [X ] Patient & Family Centered Care Referral [ ]  	  Other Symptoms: Unable to self report    PHYSICAL EXAM:   Vital Signs Last 24 Hrs  T(C): 36.7 (01 Apr 2025 08:04), Max: 36.7 (01 Apr 2025 08:04)  T(F): 98.1 (01 Apr 2025 08:04), Max: 98.1 (01 Apr 2025 08:04)  HR: 77 (01 Apr 2025 08:04) (77 - 77)  BP: 94/55 (01 Apr 2025 08:04) (94/55 - 94/55)  BP(mean): --  RR: 18 (01 Apr 2025 08:04) (18 - 18)  SpO2: 93% (01 Apr 2025 08:04) (93% - 93%)    Parameters below as of 01 Apr 2025 08:04  Patient On (Oxygen Delivery Method): nasal cannula     I&O's Summary    31 Mar 2025 07:01  -  01 Apr 2025 07:00  --------------------------------------------------------  IN: 0 mL / OUT: 350 mL / NET: -350 mL    GENERAL: [ ] Cachexia  [ ]Alert  [ ]Oriented x   [ ]Lethargic  [ X]Unarousable  [ ]Verbal  [X ]Non-Verbal  Behavioral:   [ ] Anxiety  [ ] Delirium [ ] Agitation [ ] Other  HEENT:  [ ]Normal   [X ]Dry mouth   [ ]ET Tube/Trach  [ ]Oral lesions  PULMONARY:   [ ]Clear [ ]Tachypnea  [ X]Audible excessive secretions   [ ]Rhonchi        [ ]Right [ ]Left [ ]Bilateral  [ ]Crackles        [ ]Right [ ]Left [ ]Bilateral  [ ]Wheezing     [ ]Right [ ]Left [ ]Bilateral  [ ]Diminished BS [ ]Right [ ]Left [ ]Bilateral    CARDIOVASCULAR:    [X ]Regular [ ]Irregular [ ]Tachy  [ ]Avinash [ ]Murmur [ ]Other  GASTROINTESTINAL:  [X ]Soft  [ ]Distended   [ X]+BS  [ X]Non tender [ ]Tender  [ ]Other [ ]PEG [ ]OGT/ NGT   Last BM: 3/26  GENITOURINARY:  [x ]Normal [X ] Incontinent   [ ]Oliguria/Anuria   [ ]Morales  MUSCULOSKELETAL:   [ ]Normal   [ X]Weakness  [ X]Bed/Wheelchair bound [ ]Edema  NEUROLOGIC:   [ ]No focal deficits  [X ] Cognitive impairment  [ ] Dysphagia [ ]Dysarthria [ ] Paresis [ ]Other   SKIN:   [ ]Normal  [ ]Rash  [X ]Other- Incontinence associated dermatitis.   [ X]Pressure ulcer(s)  [ ]y [ ]n  Present on admission  Sacrum and right heel DTI. Please see nursing assessment for further details for which I have reviewed.    CRITICAL CARE:  [ ] Shock Present  [ ]Septic [ ]Cardiogenic [ ]Neurologic [ ]Hypovolemic  [ ]  Vasopressors [ ]  Inotropes   [ ] Respiratory failure present [ ] Mechanical Ventilation [ ] Non-invasive ventilatory support [ ] High-Flow    [ ] Acute  [ ] Chronic [ ] Hypoxic  [ ] Hypercarbic [ ] Other  [ X] Other organ failure- Brain, skin    LABS: None new.    RADIOLOGY & ADDITIONAL STUDIES: None new.    PROTEIN CALORIE MALNUTRITION: [ ] mild [ ] moderate [ ] severe  [X ] underweight- please see the nutritionist note [ ] morbid obesity    https://www.andeal.org/vault/2440/web/files/ONC/Table_Clinical%20Characteristics%20to%20Document%20Malnutrition-White%20JV%20et%20al%202012.pdf  [ X] PPSV2 < or = 30% [ ] significant weight loss [ ] poor nutritional intake [ ] anasarca   Artificial Nutrition [ ]     Other REFERRALS:    [ ] Hospice  [ ]Child Life  [X ]Social Work  [ ]Case management [ ]Holistic Therapy [ ] Physical Therapy [ ] Dietary         Palliative Performance Scale:  http://npcrc.org/files/news/palliative_performance_scale_ppsv2.pdf  (Ctrl +  left click to view)  Respiratory Distress Observation Tool:  https://[email or web address].[email or web address]/handouts/hen/Respiratory_Distress_Observation_Scale.pdf (Ctrl +  left click to view)  PAINAD Score:  http://[email or web address].[email or web address]/cog/painad.pdf (Ctrl +  left click to view)

## 2025-04-01 NOTE — PROGRESS NOTE ADULT - SUBJECTIVE AND OBJECTIVE BOX
Patient is a 89y old  Female who presents with a chief complaint of AHRF, sepsis presume 2/2 PNA, UTI (01 Apr 2025 09:02)      SUBJECTIVE / OVERNIGHT EVENTS: lethargic  Review of Systems  unobtainable     MEDICATIONS  (STANDING):  HYDROmorphone  Injectable 0.5 milliGRAM(s) IV Push every 6 hours    MEDICATIONS  (PRN):  acetaminophen  Suppository .. 650 milliGRAM(s) Rectal every 6 hours PRN Temp greater or equal to 38C (100.4F), Mild Pain (1 - 3)  bisacodyl Suppository 10 milliGRAM(s) Rectal daily PRN Constipation  glycopyrrolate Injectable 0.4 milliGRAM(s) IV Push every 6 hours PRN secretions  HYDROmorphone  Injectable 0.5 milliGRAM(s) IV Push every 1 hour PRN Moderate Pain (4 - 6)  HYDROmorphone  Injectable 1 milliGRAM(s) IV Push every 1 hour PRN Severe Pain (7 - 10)  HYDROmorphone  Injectable 1 milliGRAM(s) IV Push every 1 hour PRN dyspnea  LORazepam   Injectable 0.5 milliGRAM(s) IV Push every 6 hours PRN Agitation      Vital Signs Last 24 Hrs  T(C): 36.7 (01 Apr 2025 08:04), Max: 36.7 (01 Apr 2025 08:04)  T(F): 98.1 (01 Apr 2025 08:04), Max: 98.1 (01 Apr 2025 08:04)  HR: 77 (01 Apr 2025 08:04) (77 - 77)  BP: 94/55 (01 Apr 2025 08:04) (94/55 - 94/55)  BP(mean): --  RR: 18 (01 Apr 2025 08:04) (18 - 18)  SpO2: 93% (01 Apr 2025 08:04) (93% - 93%)    Parameters below as of 01 Apr 2025 08:04  Patient On (Oxygen Delivery Method): nasal cannula        PHYSICAL EXAM:  GENERAL: NAD   HEAD:  Atraumatic, Normocephalic  NECK: Supple, No JVD  CHEST/LUNG: Clear to auscultation bilaterally; No wheeze  HEART: Regular rate and rhythm; No murmurs, rubs, or gallops  ABDOMEN: Soft, Nontender, Nondistended; Bowel sounds present  EXTREMITIES: no edema  PSYCH: lethargic   NEUROLOGY: non-focal  SKIN: No rashes or lesions    LABS:                      RADIOLOGY & ADDITIONAL TESTS:    Imaging Personally Reviewed:    Consultant(s) Notes Reviewed:      Care Discussed with Consultants/Other Providers:

## 2025-04-01 NOTE — PROGRESS NOTE ADULT - ASSESSMENT
89F PMH HLD, CKD3, legally blind, b/l SNHL, dementia (minimally verbally responsive baseline per ED), urinary retention, nontoxic goiter, GERD, constipation, anxiety, depression, R femur fx, presents from [facility] reportedly for respiratory distress (hypoxemia to SpO2% 80s by EMS) and decreased responsiveness a/w sepsis 2/2 PNA/UTI c/f aspiration    Sepsis due to pneumonia.   - resolved  - s/p  zosyn  - s/p IVF     Acute UTI.   - s/p antibiotic    Acute hypoxemic respiratory failure.    - comfort care    Metabolic encephalopathy.   - comfort care    Hypercalcemia.   - s/p IVF     Hypernatremia.   - s/p IVF    Chronic hypertension.   - comfort care    Stage 3 chronic kidney disease.   - stable     Abnormal findings on examination of body structure. / Osteoma  - R forehead firm, circumscribed, immobile mass nontender to palpation w/o report of fall from emergency contact/ED.      Dementia.   - baseline nonverbal per emergency contact    Malnutrition  - s/p IVF    Decubitus prevention  - comfort care    Functional quadriplegia  - supportive care      PCU care    DNR    Comfort care    Andres James MD phone [phone]

## 2025-04-02 NOTE — PROGRESS NOTE ADULT - SUBJECTIVE AND OBJECTIVE BOX
GAP TEAM PALLIATIVE CARE UNIT PROGRESS NOTE:      [  ] Patient on hospice program.    INDICATION FOR PALLIATIVE CARE UNIT SERVICES/Interval HPI: Symptom management in the setting of a 89 f w/ dementia (Namenda), dysphagia, HTN, HLD, CAD (s/p stent, on ASA and plavix), TIA, MGUS, neuropathy, DCIS (s/p anastrazole). Admitted s/p fall from home. Found to have AAA, recommended for medical management. Also with functional decline and behavioral disturbance at home.     OVERNIGHT EVENTS: Chart reviewed. The patient is seen and examined at the bedside. She required PRN Dilaudid 1mg IV X1 for dyspnea, PRN Ativan 0.5mg IV X2 for agitation and PRN Robinul 0.4mg IV X1 for secretions within a 24hr period 7am-7am.    DNR on chart:  DNI: Trial NIV  DNI: Trial NIV        Allergies    No Known Allergies    Intolerances    MEDICATIONS  (STANDING):  HYDROmorphone  Injectable 0.5 milliGRAM(s) IV Push every 6 hours    MEDICATIONS  (PRN):  acetaminophen  Suppository .. 650 milliGRAM(s) Rectal every 6 hours PRN Temp greater or equal to 38C (100.4F), Mild Pain (1 - 3)  bisacodyl Suppository 10 milliGRAM(s) Rectal daily PRN Constipation  glycopyrrolate Injectable 0.4 milliGRAM(s) IV Push every 6 hours PRN secretions  HYDROmorphone  Injectable 0.5 milliGRAM(s) IV Push every 1 hour PRN Moderate Pain (4 - 6)  HYDROmorphone  Injectable 1 milliGRAM(s) IV Push every 1 hour PRN Severe Pain (7 - 10)  HYDROmorphone  Injectable 1 milliGRAM(s) IV Push every 1 hour PRN dyspnea  LORazepam   Injectable 0.5 milliGRAM(s) IV Push every 6 hours PRN Agitation    ITEMS UNCHECKED ARE NOT PRESENT    PRESENT SYMPTOMS: [X ]Unable to self-report see PAINAD, RDOS below  Source if other than patient:  [ ]Family   [ X]Team     Pain: [ ] yes [ ] no  QOL impact -   Location -                    Aggravating factors -  Quality -  Radiation -  Timing-  Severity (0-10 scale):  Minimal acceptable level (0-10 scale):       PCSSQ [Palliative Care Spiritual Screening Question]   Severity (0-10):  Score of 4 or > indicate consideration of Chaplaincy referral.  Chaplaincy Referral: [ ] yes [ ] refused [ ] following [X ] deferred    Caregiver [place]? : [X ] yes [ ] no [ ] deferred:  Social work referral [X ] Patient & Family Centered Care Referral [ ]   Anticipatory Grief present?:  [X ] yes [ ] no [ ] deferred:  Social work referral [X ] Patient & Family Centered Care Referral [ ]  	  Other Symptoms: Unable to self report    PHYSICAL EXAM:   Vital Signs Last 24 Hrs  T(C): 37.3 (02 Apr 2025 09:03), Max: 37.3 (02 Apr 2025 09:03)  T(F): 99.1 (02 Apr 2025 09:03), Max: 99.1 (02 Apr 2025 09:03)  HR: 162 (02 Apr 2025 09:03) (162 - 162)  BP: --  BP(mean): --  RR: 16 (02 Apr 2025 09:03) (16 - 16)  SpO2: 93% (02 Apr 2025 09:03) (93% - 93%)    Parameters below as of 02 Apr 2025 09:03  Patient On (Oxygen Delivery Method): nasal cannula     I&O's Summary    01 Apr 2025 07:01  -  02 Apr 2025 07:00  --------------------------------------------------------  IN: 0 mL / OUT: 100 mL / NET: -100 mL    GENERAL: [ ] Cachexia  [ ]Alert  [ ]Oriented x   [ ]Lethargic  [ X]Unarousable  [ ]Verbal  [X ]Non-Verbal  Behavioral:   [ ] Anxiety  [ ] Delirium [ ] Agitation [ ] Other  HEENT:  [ ]Normal   [X ]Dry mouth   [ ]ET Tube/Trach  [ ]Oral lesions  PULMONARY:   [ ]Clear [ ]Tachypnea  [ X]Audible excessive secretions   [ ]Rhonchi        [ ]Right [ ]Left [ ]Bilateral  [ ]Crackles        [ ]Right [ ]Left [ ]Bilateral  [ ]Wheezing     [ ]Right [ ]Left [ ]Bilateral  [ ]Diminished BS [ ]Right [ ]Left [ ]Bilateral    CARDIOVASCULAR:    [ ]Regular [ ]Irregular [X ]Tachy  [ ]Avinash [ ]Murmur [ ]Other  GASTROINTESTINAL:  [X ]Soft  [ ]Distended   [ X]+BS  [ X]Non tender [ ]Tender  [ ]Other [ ]PEG [ ]OGT/ NGT   Last BM: 3/26  GENITOURINARY:  [X]Normal [X ] Incontinent   [ ]Oliguria/Anuria   [ ]Morales  MUSCULOSKELETAL:   [ ]Normal   [ X]Weakness  [ X]Bed/Wheelchair bound [ ]Edema  NEUROLOGIC:   [ ]No focal deficits  [X ] Cognitive impairment  [ ] Dysphagia [ ]Dysarthria [ ] Paresis [ ]Other   SKIN:   [ ]Normal  [ ]Rash  [X ]Other- Incontinence associated dermatitis.   [ X]Pressure ulcer(s)  [ ]y [ ]n  Present on admission  Sacrum and right heel DTI. Please see nursing assessment for further details for which I have reviewed.    CRITICAL CARE:  [ ] Shock Present  [ ]Septic [ ]Cardiogenic [ ]Neurologic [ ]Hypovolemic  [ ]  Vasopressors [ ]  Inotropes   [ ] Respiratory failure present [ ] Mechanical Ventilation [ ] Non-invasive ventilatory support [ ] High-Flow    [ ] Acute  [ ] Chronic [ ] Hypoxic  [ ] Hypercarbic [ ] Other  [ X] Other organ failure- Brain, skin    LABS: None new.    RADIOLOGY & ADDITIONAL STUDIES: None new.    PROTEIN CALORIE MALNUTRITION: [ ] mild [ ] moderate [ ] severe  [X ] underweight- please see the nutritionist note [ ] morbid obesity    https://www.andeal.org/vault/2440/web/files/ONC/Table_Clinical%20Characteristics%20to%20Document%20Malnutrition-White%20JV%20et%20al%202012.pdf      [X ] PPSV2 < or = 30% [ ] significant weight loss [ ] poor nutritional intake [ ] anasarca   Artificial Nutrition [ ]     Other REFERRALS:    [ ] Hospice  [ ]Child Life  [ X]Social Work  [ ]Case management [ ]Holistic Therapy [ ] Physical Therapy [ ] Dietary         Palliative Performance Scale:  http://npcrc.org/files/news/palliative_performance_scale_ppsv2.pdf  (Ctrl +  left click to view)  Respiratory Distress Observation Tool:  https://homecareinformation.net/handouts/hen/Respiratory_Distress_Observation_Scale.pdf (Ctrl +  left click to view)  PAINAD Score:  http://geriatrictoolkit.[facility]/cog/painad.pdf (Ctrl +  left click to view)   GAP TEAM PALLIATIVE CARE UNIT PROGRESS NOTE:      [  ] Patient on hospice program.    INDICATION FOR PALLIATIVE CARE UNIT SERVICES/Interval HPI: Symptom management in the setting of a 89 f w/ dementia (Namenda), dysphagia, HTN, HLD, CAD (s/p stent, on ASA and plavix), TIA, MGUS, neuropathy, DCIS (s/p anastrazole). Admitted s/p fall from home. Found to have AAA, recommended for medical management. Also with functional decline and behavioral disturbance at home.     OVERNIGHT EVENTS: Chart reviewed. The patient is seen and examined at the bedside. She required PRN Dilaudid 1mg IV X1 for dyspnea, PRN Ativan 0.5mg IV X2 for agitation and PRN Robinul 0.4mg IV X1 for secretions within a 24hr period 7am-7am.    DNR on chart:  yes  DNI    Allergies    No Known Allergies    Intolerances    MEDICATIONS  (STANDING):  HYDROmorphone  Injectable 0.5 milliGRAM(s) IV Push every 6 hours    MEDICATIONS  (PRN):  acetaminophen  Suppository .. 650 milliGRAM(s) Rectal every 6 hours PRN Temp greater or equal to 38C (100.4F), Mild Pain (1 - 3)  bisacodyl Suppository 10 milliGRAM(s) Rectal daily PRN Constipation  glycopyrrolate Injectable 0.4 milliGRAM(s) IV Push every 6 hours PRN secretions  HYDROmorphone  Injectable 0.5 milliGRAM(s) IV Push every 1 hour PRN Moderate Pain (4 - 6)  HYDROmorphone  Injectable 1 milliGRAM(s) IV Push every 1 hour PRN Severe Pain (7 - 10)  HYDROmorphone  Injectable 1 milliGRAM(s) IV Push every 1 hour PRN dyspnea  LORazepam   Injectable 0.5 milliGRAM(s) IV Push every 6 hours PRN Agitation    ITEMS UNCHECKED ARE NOT PRESENT    PRESENT SYMPTOMS: [X ]Unable to self-report see PAINAD, RDOS below  Source if other than patient:  [ ]Family   [ X]Team     Pain: [ ] yes [ ] no  QOL impact -   Location -                    Aggravating factors -  Quality -  Radiation -  Timing-  Severity (0-10 scale):  Minimal acceptable level (0-10 scale):       PCSSQ [Palliative Care Spiritual Screening Question]   Severity (0-10):  Score of 4 or > indicate consideration of Chaplaincy referral.  Chaplaincy Referral: [ ] yes [ ] refused [ ] following [X ] deferred    Caregiver [place]? : [X ] yes [ ] no [ ] deferred:  Social work referral [X ] Patient & Family Centered Care Referral [ ]   Anticipatory Grief present?:  [X ] yes [ ] no [ ] deferred:  Social work referral [X ] Patient & Family Centered Care Referral [ ]  	  Other Symptoms: Unable to self report    PHYSICAL EXAM:   Vital Signs Last 24 Hrs  T(C): 37.3 (02 Apr 2025 09:03), Max: 37.3 (02 Apr 2025 09:03)  T(F): 99.1 (02 Apr 2025 09:03), Max: 99.1 (02 Apr 2025 09:03)  HR: 162 (02 Apr 2025 09:03) (162 - 162)  BP: --  BP(mean): --  RR: 16 (02 Apr 2025 09:03) (16 - 16)  SpO2: 93% (02 Apr 2025 09:03) (93% - 93%)    Parameters below as of 02 Apr 2025 09:03  Patient On (Oxygen Delivery Method): nasal cannula     I&O's Summary    01 Apr 2025 07:01  -  02 Apr 2025 07:00  --------------------------------------------------------  IN: 0 mL / OUT: 100 mL / NET: -100 mL    GENERAL: [ ] Cachexia  [ ]Alert  [ ]Oriented x   [ ]Lethargic  [ X]Unarousable  [ ]Verbal  [X ]Non-Verbal  Behavioral:   [ ] Anxiety  [ ] Delirium [ ] Agitation [ ] Other  HEENT:  [ ]Normal   [X ]Dry mouth   [ ]ET Tube/Trach  [ ]Oral lesions  PULMONARY:   [ ]Clear [ ]Tachypnea  [ X]Audible excessive secretions   [ ]Rhonchi        [ ]Right [ ]Left [ ]Bilateral  [ ]Crackles        [ ]Right [ ]Left [ ]Bilateral  [ ]Wheezing     [ ]Right [ ]Left [ ]Bilateral  [ ]Diminished BS [ ]Right [ ]Left [ ]Bilateral    CARDIOVASCULAR:    [ ]Regular [ ]Irregular [X ]Tachy  [ ]Avinash [ ]Murmur [ ]Other  GASTROINTESTINAL:  [X ]Soft  [ ]Distended   [ X]+BS  [ X]Non tender [ ]Tender  [ ]Other [ ]PEG [ ]OGT/ NGT   Last BM: 3/26  GENITOURINARY:  [X]Normal [X ] Incontinent   [ ]Oliguria/Anuria   [ ]Morales  MUSCULOSKELETAL:   [ ]Normal   [ X]Weakness  [ X]Bed/Wheelchair bound [ ]Edema  NEUROLOGIC:   [ ]No focal deficits  [X ] Cognitive impairment  [ ] Dysphagia [ ]Dysarthria [ ] Paresis [ ]Other   SKIN:   [ ]Normal  [ ]Rash  [X ]Other- Incontinence associated dermatitis.   [ X]Pressure ulcer(s)  [ ]y [ ]n  Present on admission  Sacrum and right heel DTI. Please see nursing assessment for further details for which I have reviewed.    CRITICAL CARE:  [ ] Shock Present  [ ]Septic [ ]Cardiogenic [ ]Neurologic [ ]Hypovolemic  [ ]  Vasopressors [ ]  Inotropes   [ ] Respiratory failure present [ ] Mechanical Ventilation [ ] Non-invasive ventilatory support [ ] High-Flow    [ ] Acute  [ ] Chronic [ ] Hypoxic  [ ] Hypercarbic [ ] Other  [ X] Other organ failure- Brain, skin    LABS: None new.    RADIOLOGY & ADDITIONAL STUDIES: None new.    PROTEIN CALORIE MALNUTRITION: [ ] mild [ ] moderate [ ] severe  [X ] underweight- please see the nutritionist note [ ] morbid obesity    https://www.andeal.org/vault/2440/web/files/ONC/Table_Clinical%20Characteristics%20to%20Document%20Malnutrition-White%20JV%20et%20al%202012.pdf      [X ] PPSV2 < or = 30% [ ] significant weight loss [ ] poor nutritional intake [ ] anasarca   Artificial Nutrition [ ]     Other REFERRALS:    [ ] Hospice  [ ]Child Life  [ X]Social Work  [ ]Case management [ ]Holistic Therapy [ ] Physical Therapy [ ] Dietary         Palliative Performance Scale:  http://npcrc.org/files/news/palliative_performance_scale_ppsv2.pdf  (Ctrl +  left click to view)  Respiratory Distress Observation Tool:  https://homecareinformation.net/handouts/hen/Respiratory_Distress_Observation_Scale.pdf (Ctrl +  left click to view)  PAINAD Score:  http://geriatrictoolkit.[facility]/cog/painad.pdf (Ctrl +  left click to view)

## 2025-04-02 NOTE — PROGRESS NOTE ADULT - SUBJECTIVE AND OBJECTIVE BOX
Patient is a 89y old  Female who presents with a chief complaint of AHRF, sepsis presume 2/2 PNA, UTI (02 Apr 2025 09:05)      SUBJECTIVE / OVERNIGHT EVENTS: lethargic  at bedside.   Review of Systems  unobtainable     MEDICATIONS  (STANDING):  HYDROmorphone  Injectable 0.5 milliGRAM(s) IV Push every 6 hours    MEDICATIONS  (PRN):  acetaminophen  Suppository .. 650 milliGRAM(s) Rectal every 6 hours PRN Temp greater or equal to 38C (100.4F), Mild Pain (1 - 3)  bisacodyl Suppository 10 milliGRAM(s) Rectal daily PRN Constipation  glycopyrrolate Injectable 0.4 milliGRAM(s) IV Push every 6 hours PRN secretions  HYDROmorphone  Injectable 0.5 milliGRAM(s) IV Push every 1 hour PRN Moderate Pain (4 - 6)  HYDROmorphone  Injectable 1 milliGRAM(s) IV Push every 1 hour PRN Severe Pain (7 - 10)  HYDROmorphone  Injectable 1 milliGRAM(s) IV Push every 1 hour PRN dyspnea  LORazepam   Injectable 0.5 milliGRAM(s) IV Push every 6 hours PRN Agitation      Vital Signs Last 24 Hrs  T(C): 37.3 (02 Apr 2025 09:03), Max: 37.3 (02 Apr 2025 09:03)  T(F): 99.1 (02 Apr 2025 09:03), Max: 99.1 (02 Apr 2025 09:03)  HR: 162 (02 Apr 2025 09:03) (162 - 162)  BP: --  BP(mean): --  RR: 16 (02 Apr 2025 09:03) (16 - 16)  SpO2: 93% (02 Apr 2025 09:03) (93% - 93%)    Parameters below as of 02 Apr 2025 09:03  Patient On (Oxygen Delivery Method): nasal cannula        PHYSICAL EXAM:  GENERAL: NAD  HEAD:  Atraumatic, Normocephalic  NECK: Supple, No JVD  CHEST/LUNG: few ronchi to auscultation bilaterally; No wheeze  HEART: Regular rate and rhythm; No murmurs, rubs, or gallops  ABDOMEN: Soft, Nontender, Nondistended; Bowel sounds present  EXTREMITIES:  2+ Peripheral Pulses, No clubbing, cyanosis, or edema  PSYCH: AAOx0  NEUROLOGY: non-focal  SKIN: No rashes or lesions    LABS:                      RADIOLOGY & ADDITIONAL TESTS:    Imaging Personally Reviewed:    Consultant(s) Notes Reviewed:      Care Discussed with Consultants/Other Providers:

## 2025-04-02 NOTE — PROGRESS NOTE ADULT - PROBLEM SELECTOR PLAN 2
Controlled with parenteral medications.  - Dilaudid 0.5mg IV q6hr ATC  - Dilaudid 1mg IV q1hr PRN for dyspnea. ( 1 required in a 24hr period 7am-7am)   - Bowel regimen while on opioids.  Monitor for constipation.

## 2025-04-02 NOTE — PROGRESS NOTE ADULT - ASSESSMENT
89F PMH HLD, CKD3, legally blind, b/l SNHL, dementia (minimally verbally responsive baseline per ED), urinary retention, nontoxic goiter, GERD, constipation, anxiety, depression, R femur fx, presents from [facility] reportedly for respiratory distress (hypoxemia to SpO2% 80s by EMS) and decreased responsiveness a/w sepsis 2/2 PNA/UTI c/f aspiration    Sepsis due to pneumonia.   - resolved  - s/p  zosyn  - s/p IVF     Acute UTI.   - s/p antibiotic    Acute hypoxemic respiratory failure.    - comfort care    Metabolic encephalopathy.   - comfort care    Hypercalcemia.   - s/p IVF     Hypernatremia.   - s/p IVF    Chronic hypertension.   - comfort care    Stage 3 chronic kidney disease.   - stable     Abnormal findings on examination of body structure. / Osteoma  - R forehead firm, circumscribed, immobile mass nontender to palpation w/o report of fall from emergency contact/ED.      Dementia.   - baseline nonverbal per emergency contact    Malnutrition  - s/p IVF    Decubitus prevention  - comfort care    Functional quadriplegia  - supportive care      PCU care    DNR    Comfort care    d/w , HCP at bedside    Andres James MD phone [phone]

## 2025-04-03 NOTE — PROGRESS NOTE ADULT - SUBJECTIVE AND OBJECTIVE BOX
GAP TEAM PALLIATIVE CARE UNIT PROGRESS NOTE:      [  ] Patient on hospice program.    INDICATION FOR PALLIATIVE CARE UNIT SERVICES/Interval HPI: Symptom management in the setting of a 89 f w/ dementia (Namenda), dysphagia, HTN, HLD, CAD (s/p stent, on ASA and plavix), TIA, MGUS, neuropathy, DCIS (s/p anastrazole). Admitted s/p fall from home. Found to have AAA, recommended for medical management. Also with functional decline and behavioral disturbance at home.     OVERNIGHT EVENTS: Chart reviewed. The patient is seen and examined at the bedside. She required PRN Dilaudid 1mg IV X1 for severe pain and X1 for dyspnea within a 24hr period 7am-7am.      DNR on chart:  DNI: Trial NIV  DNI: Trial NIV        Allergies    No Known Allergies    Intolerances    MEDICATIONS  (STANDING):  HYDROmorphone  Injectable 0.5 milliGRAM(s) IV Push every 6 hours    MEDICATIONS  (PRN):  acetaminophen  Suppository .. 650 milliGRAM(s) Rectal every 6 hours PRN Temp greater or equal to 38C (100.4F), Mild Pain (1 - 3)  bisacodyl Suppository 10 milliGRAM(s) Rectal daily PRN Constipation  glycopyrrolate Injectable 0.4 milliGRAM(s) IV Push every 6 hours PRN secretions  HYDROmorphone  Injectable 0.5 milliGRAM(s) IV Push every 1 hour PRN Moderate Pain (4 - 6)  HYDROmorphone  Injectable 1 milliGRAM(s) IV Push every 1 hour PRN Severe Pain (7 - 10)  HYDROmorphone  Injectable 1 milliGRAM(s) IV Push every 1 hour PRN dyspnea  LORazepam   Injectable 0.5 milliGRAM(s) IV Push every 6 hours PRN Agitation    ITEMS UNCHECKED ARE NOT PRESENT    PRESENT SYMPTOMS: [X ]Unable to self-report see PAINAD, RDOS below  Source if other than patient:  [ ]Family   [X ]Team     Pain: [ ] yes [ ] no  QOL impact -   Location -                    Aggravating factors -  Quality -  Radiation -  Timing-  Severity (0-10 scale):  Minimal acceptable level (0-10 scale):       PCSSQ [Palliative Care Spiritual Screening Question]   Severity (0-10):  Score of 4 or > indicate consideration of Chaplaincy referral.  Chaplaincy Referral: [ ] yes [ ] refused [ X] following [ ] deferred- Last seen on 4/2    Caregiver [place]? : [X ] yes [ ] no [ ] deferred:  Social work referral [X ] Patient & Family Centered Care Referral [ ]   Anticipatory Grief present?:  [X ] yes [ ] no [ ] deferred:  Social work referral [X ] Patient & Family Centered Care Referral [ ]  	  Other Symptoms: Unable to self report    PHYSICAL EXAM:   Vital Signs Last 24 Hrs  T(C): 37.1 (03 Apr 2025 08:15), Max: 37.1 (03 Apr 2025 08:15)  T(F): 98.7 (03 Apr 2025 08:15), Max: 98.7 (03 Apr 2025 08:15)  HR: 148 (03 Apr 2025 08:15) (148 - 148)  BP: --  BP(mean): --  RR: 20 (03 Apr 2025 08:15) (20 - 20)  SpO2: 86% (03 Apr 2025 08:15) (86% - 86%)    Parameters below as of 03 Apr 2025 08:15  Patient On (Oxygen Delivery Method): nasal cannula    I&O's Summary    GENERAL: [ ] Cachexia  [ ]Alert  [ ]Oriented x   [ ]Lethargic  [ X]Unarousable  [ ]Verbal  [X ]Non-Verbal  Behavioral:   [ ] Anxiety  [ ] Delirium [ ] Agitation [ ] Other  HEENT:  [ ]Normal   [X ]Dry mouth   [ ]ET Tube/Trach  [ ]Oral lesions  PULMONARY:   [ ]Clear [ ]Tachypnea  [ X]Audible excessive secretions   [ ]Rhonchi        [ ]Right [ ]Left [ ]Bilateral  [ ]Crackles        [ ]Right [ ]Left [ ]Bilateral  [ ]Wheezing     [ ]Right [ ]Left [ ]Bilateral  [ ]Diminished BS [ ]Right [ ]Left [ ]Bilateral    CARDIOVASCULAR:    [ ]Regular [ ]Irregular [X ]Tachy  [ ]Avinash [ ]Murmur [ ]Other  GASTROINTESTINAL:  [X ]Soft  [ ]Distended   [ X]+BS  [ X]Non tender [ ]Tender  [ ]Other [ ]PEG [ ]OGT/ NGT   Last BM: 3/26  GENITOURINARY:  [X]Normal [X ] Incontinent   [ ]Oliguria/Anuria   [ X]Morales  MUSCULOSKELETAL:   [ ]Normal   [ X]Weakness  [ X]Bed/Wheelchair bound [ ]Edema  NEUROLOGIC:   [ ]No focal deficits  [X ] Cognitive impairment  [ ] Dysphagia [ ]Dysarthria [ ] Paresis [ ]Other   SKIN:   [ ]Normal  [ ]Rash  [X ]Other- Incontinence associated dermatitis.   [ X]Pressure ulcer(s)  [ ]y [ ]n  Present on admission  Sacrum and right heel DTI. Please see nursing assessment for further details for which I have reviewed.    CRITICAL CARE:  [ ] Shock Present  [ ]Septic [ ]Cardiogenic [ ]Neurologic [ ]Hypovolemic  [ ]  Vasopressors [ ]  Inotropes   [ ] Respiratory failure present [ ] Mechanical Ventilation [ ] Non-invasive ventilatory support [ ] High-Flow    [ ] Acute  [ ] Chronic [ ] Hypoxic  [ ] Hypercarbic [ ] Other  [ X] Other organ failure- Brain, skin    LABS: None new.    RADIOLOGY & ADDITIONAL STUDIES: None new.    PROTEIN CALORIE MALNUTRITION: [ ] mild [ ] moderate [ ] severe  [X ] underweight- please see the nutritionist note [ ] morbid obesity    https://www.andeal.org/vault/2440/web/files/ONC/Table_Clinical%20Characteristics%20to%20Document%20Malnutrition-White%20JV%20et%20al%258995.pdf    [ X] PPSV2 < or = 30% [ ] significant weight loss [ ] poor nutritional intake [ ] anasarca   Artificial Nutrition [ ]     Other REFERRALS:    [ ] Hospice  [ ]Child Life  [X ]Social Work  [ ]Case management [ ]Holistic Therapy [ ] Physical Therapy [ ] Dietary       Palliative Performance Scale:  http://npcrc.org/files/news/palliative_performance_scale_ppsv2.pdf  (Ctrl +  left click to view)  Respiratory Distress Observation Tool:  https://homecareinformation.net/handouts/hen/Respiratory_Distress_Observation_Scale.pdf (Ctrl +  left click to view)  PAINAD Score:  http://geriatrictoolkit.[place].[facility]/cog/painad.pdf (Ctrl +  left click to view)   GAP TEAM PALLIATIVE CARE UNIT PROGRESS NOTE:      [  ] Patient on hospice program.    INDICATION FOR PALLIATIVE CARE UNIT SERVICES/Interval HPI: Symptom management in the setting of a 89 f w/ dementia (Namenda), dysphagia, HTN, HLD, CAD (s/p stent, on ASA and plavix), TIA, MGUS, neuropathy, DCIS (s/p anastrazole). Admitted s/p fall from home. Found to have AAA, recommended for medical management. Also with functional decline and behavioral disturbance at home.     OVERNIGHT EVENTS: Chart reviewed. The patient is seen and examined at the bedside. She required PRN Dilaudid 1mg IV X1 for severe pain and X1 for dyspnea within a 24hr period 7am-7am.      DNR on chart:  yes  DNI      Allergies    No Known Allergies    Intolerances    MEDICATIONS  (STANDING):  HYDROmorphone  Injectable 0.5 milliGRAM(s) IV Push every 6 hours    MEDICATIONS  (PRN):  acetaminophen  Suppository .. 650 milliGRAM(s) Rectal every 6 hours PRN Temp greater or equal to 38C (100.4F), Mild Pain (1 - 3)  bisacodyl Suppository 10 milliGRAM(s) Rectal daily PRN Constipation  glycopyrrolate Injectable 0.4 milliGRAM(s) IV Push every 6 hours PRN secretions  HYDROmorphone  Injectable 0.5 milliGRAM(s) IV Push every 1 hour PRN Moderate Pain (4 - 6)  HYDROmorphone  Injectable 1 milliGRAM(s) IV Push every 1 hour PRN Severe Pain (7 - 10)  HYDROmorphone  Injectable 1 milliGRAM(s) IV Push every 1 hour PRN dyspnea  LORazepam   Injectable 0.5 milliGRAM(s) IV Push every 6 hours PRN Agitation    ITEMS UNCHECKED ARE NOT PRESENT    PRESENT SYMPTOMS: [X ]Unable to self-report see PAINAD, RDOS below  Source if other than patient:  [ ]Family   [X ]Team     Pain: [ ] yes [ ] no  QOL impact -   Location -                    Aggravating factors -  Quality -  Radiation -  Timing-  Severity (0-10 scale):  Minimal acceptable level (0-10 scale):       PCSSQ [Palliative Care Spiritual Screening Question]   Severity (0-10):  Score of 4 or > indicate consideration of Chaplaincy referral.  Chaplaincy Referral: [ ] yes [ ] refused [ X] following [ ] deferred- Last seen on 4/2    Caregiver [place]? : [X ] yes [ ] no [ ] deferred:  Social work referral [X ] Patient & Family Centered Care Referral [ ]   Anticipatory Grief present?:  [X ] yes [ ] no [ ] deferred:  Social work referral [X ] Patient & Family Centered Care Referral [ ]  	  Other Symptoms: Unable to self report    PHYSICAL EXAM:   Vital Signs Last 24 Hrs  T(C): 37.1 (03 Apr 2025 08:15), Max: 37.1 (03 Apr 2025 08:15)  T(F): 98.7 (03 Apr 2025 08:15), Max: 98.7 (03 Apr 2025 08:15)  HR: 148 (03 Apr 2025 08:15) (148 - 148)  BP: --  BP(mean): --  RR: 20 (03 Apr 2025 08:15) (20 - 20)  SpO2: 86% (03 Apr 2025 08:15) (86% - 86%)    Parameters below as of 03 Apr 2025 08:15  Patient On (Oxygen Delivery Method): nasal cannula    I&O's Summary    GENERAL: [ ] Cachexia  [ ]Alert  [ ]Oriented x   [ ]Lethargic  [ X]Unarousable  [ ]Verbal  [X ]Non-Verbal  Behavioral:   [ ] Anxiety  [ ] Delirium [ ] Agitation [ ] Other  HEENT:  [ ]Normal   [X ]Dry mouth   [ ]ET Tube/Trach  [ ]Oral lesions  PULMONARY:   [ ]Clear [ ]Tachypnea  [ X]Audible excessive secretions   [ ]Rhonchi        [ ]Right [ ]Left [ ]Bilateral  [ ]Crackles        [ ]Right [ ]Left [ ]Bilateral  [ ]Wheezing     [ ]Right [ ]Left [ ]Bilateral  [ ]Diminished BS [ ]Right [ ]Left [ ]Bilateral    CARDIOVASCULAR:    [ ]Regular [ ]Irregular [X ]Tachy  [ ]Avinash [ ]Murmur [ ]Other  GASTROINTESTINAL:  [X ]Soft  [ ]Distended   [ X]+BS  [ X]Non tender [ ]Tender  [ ]Other [ ]PEG [ ]OGT/ NGT   Last BM: 3/26  GENITOURINARY:  [X]Normal [X ] Incontinent   [ ]Oliguria/Anuria   [ X]Morales  MUSCULOSKELETAL:   [ ]Normal   [ X]Weakness  [ X]Bed/Wheelchair bound [ ]Edema  NEUROLOGIC:   [ ]No focal deficits  [X ] Cognitive impairment  [ ] Dysphagia [ ]Dysarthria [ ] Paresis [ ]Other   SKIN:   [ ]Normal  [ ]Rash  [X ]Other- Incontinence associated dermatitis.   [ X]Pressure ulcer(s)  [ ]y [ ]n  Present on admission  Sacrum and right heel DTI. Please see nursing assessment for further details for which I have reviewed.    CRITICAL CARE:  [ ] Shock Present  [ ]Septic [ ]Cardiogenic [ ]Neurologic [ ]Hypovolemic  [ ]  Vasopressors [ ]  Inotropes   [ ] Respiratory failure present [ ] Mechanical Ventilation [ ] Non-invasive ventilatory support [ ] High-Flow    [ ] Acute  [ ] Chronic [ ] Hypoxic  [ ] Hypercarbic [ ] Other  [ X] Other organ failure- Brain, skin    LABS: None new.    RADIOLOGY & ADDITIONAL STUDIES: None new.    PROTEIN CALORIE MALNUTRITION: [ ] mild [ ] moderate [ ] severe  [X ] underweight- please see the nutritionist note [ ] morbid obesity    https://www.andeal.org/vault/2440/web/files/ONC/Table_Clinical%20Characteristics%20to%20Document%20Malnutrition-White%20JV%20et%20al%319041.pdf    [ X] PPSV2 < or = 30% [ ] significant weight loss [ ] poor nutritional intake [ ] anasarca   Artificial Nutrition [ ]     Other REFERRALS:    [ ] Hospice  [ ]Child Life  [X ]Social Work  [ ]Case management [ ]Holistic Therapy [ ] Physical Therapy [ ] Dietary       Palliative Performance Scale:  http://npcrc.org/files/news/palliative_performance_scale_ppsv2.pdf  (Ctrl +  left click to view)  Respiratory Distress Observation Tool:  https://homecareinformation.net/handouts/hen/Respiratory_Distress_Observation_Scale.pdf (Ctrl +  left click to view)  PAINAD Score:  http://geriatrictoolkit.[place].[facility]/cog/painad.pdf (Ctrl +  left click to view)

## 2025-04-03 NOTE — PROGRESS NOTE ADULT - ASSESSMENT
89F PMH HLD, CKD3, legally blind, b/l SNHL, dementia (minimally verbally responsive baseline per ED), urinary retention, nontoxic goiter, GERD, constipation, anxiety, depression, R femur fx, presents from [facility] reportedly for respiratory distress (hypoxemia to SpO2% 80s by EMS) and decreased responsiveness a/w sepsis 2/2 PNA/UTI c/f aspiration    Sepsis due to pneumonia.   - resolved  - s/p  zosyn  - s/p IVF     Acute UTI.   - s/p antibiotic    Acute hypoxemic respiratory failure.    - comfort care    Metabolic encephalopathy.   - comfort care    Hypercalcemia.   - s/p IVF     Hypernatremia.   - s/p IVF    Chronic hypertension.   - comfort care    Stage 3 chronic kidney disease.   - stable     Abnormal findings on examination of body structure. / Osteoma  - R forehead firm, circumscribed, immobile mass nontender to palpation w/o report of fall from emergency contact/ED.      Dementia.   - baseline nonverbal per emergency contact    Malnutrition  - s/p IVF    Decubitus prevention  - comfort care    Functional quadriplegia  - supportive care      PCU care    DNR    Comfort care    Andres James MD phone [phone]

## 2025-04-03 NOTE — PROGRESS NOTE ADULT - SUBJECTIVE AND OBJECTIVE BOX
Patient is a 89y old  Female who presents with a chief complaint of AHRF, sepsis presume 2/2 PNA, UTI (03 Apr 2025 09:19)      SUBJECTIVE / OVERNIGHT EVENTS: lethargic  Review of Systems  unobtainable     MEDICATIONS  (STANDING):  HYDROmorphone  Injectable 0.5 milliGRAM(s) IV Push every 6 hours    MEDICATIONS  (PRN):  acetaminophen  Suppository .. 650 milliGRAM(s) Rectal every 6 hours PRN Temp greater or equal to 38C (100.4F), Mild Pain (1 - 3)  bisacodyl Suppository 10 milliGRAM(s) Rectal daily PRN Constipation  glycopyrrolate Injectable 0.4 milliGRAM(s) IV Push every 6 hours PRN secretions  HYDROmorphone  Injectable 0.5 milliGRAM(s) IV Push every 1 hour PRN Moderate Pain (4 - 6)  HYDROmorphone  Injectable 1 milliGRAM(s) IV Push every 1 hour PRN Severe Pain (7 - 10)  HYDROmorphone  Injectable 1 milliGRAM(s) IV Push every 1 hour PRN dyspnea  LORazepam   Injectable 0.5 milliGRAM(s) IV Push every 6 hours PRN Agitation      Vital Signs Last 24 Hrs  T(C): 37.1 (03 Apr 2025 08:15), Max: 37.1 (03 Apr 2025 08:15)  T(F): 98.7 (03 Apr 2025 08:15), Max: 98.7 (03 Apr 2025 08:15)  HR: 148 (03 Apr 2025 08:15) (148 - 148)  BP: --  BP(mean): --  RR: 20 (03 Apr 2025 08:15) (20 - 20)  SpO2: 86% (03 Apr 2025 08:15) (86% - 86%)    Parameters below as of 03 Apr 2025 08:15  Patient On (Oxygen Delivery Method): nasal cannula        PHYSICAL EXAM:  GENERAL: NAD  HEAD:  Atraumatic, Normocephalic  NECK: Supple, No JVD  CHEST/LUNG: Clear to auscultation bilaterally; No wheeze  HEART: Regular rate and rhythm; No murmurs, rubs, or gallops  ABDOMEN: Soft, Nontender, Nondistended; Bowel sounds present  EXTREMITIES:  2+ Peripheral Pulses, No clubbing, cyanosis, or edema  PSYCH: AAOx0  NEUROLOGY: non-focal  SKIN: No rashes or lesions    LABS:                      RADIOLOGY & ADDITIONAL TESTS:    Imaging Personally Reviewed:    Consultant(s) Notes Reviewed:      Care Discussed with Consultants/Other Providers:

## 2025-04-03 NOTE — PROGRESS NOTE ADULT - PROBLEM SELECTOR PLAN 2
Controlled with parenteral medications.  - Dilaudid 0.5mg IV q6hr ATC  - Dilaudid 1mg IV q1hr PRN for dyspnea. ( 1 required in a 24hr period 7am-7am)   - Bowel regimen while on opioids.  Monitor for constipation. no

## 2025-04-03 NOTE — PROGRESS NOTE ADULT - PROBLEM SELECTOR PLAN 1
- Turn and position for postural drainage.  - Continue with Robinul 0.4mg IV q6hr PRN. ( None required in a 24hr period 7am-7am)

## 2025-04-04 NOTE — PROGRESS NOTE ADULT - NUTRITIONAL ASSESSMENT
This patient has been assessed with a concern for Malnutrition and has been determined to have a diagnosis/diagnoses of Moderate protein-calorie malnutrition and Underweight (BMI < 19).    This patient is being managed with:   Diet NPO-  Entered: Mar 19 2025  4:41AM  
This patient has been assessed with a concern for Malnutrition and has been determined to have a diagnosis/diagnoses of Moderate protein-calorie malnutrition and Underweight (BMI < 19).    This patient is being managed with:   Diet NPO-  Entered: Mar 19 2025  4:41AM  
This patient has been assessed with a concern for Malnutrition and has been determined to have a diagnosis/diagnoses of Underweight (BMI < 19).    This patient is being managed with:   Diet NPO-  Entered: Mar 19 2025  4:41AM  
This patient has been assessed with a concern for Malnutrition and has been determined to have a diagnosis/diagnoses of Moderate protein-calorie malnutrition and Underweight (BMI < 19).    This patient is being managed with:   Diet NPO-  Entered: Mar 19 2025  4:41AM  
This patient has been assessed with a concern for Malnutrition and has been determined to have a diagnosis/diagnoses of Underweight (BMI < 19).    This patient is being managed with:   Diet NPO-  Entered: Mar 19 2025  4:41AM  
This patient has been assessed with a concern for Malnutrition and has been determined to have a diagnosis/diagnoses of Moderate protein-calorie malnutrition and Underweight (BMI < 19).    This patient is being managed with:   Diet NPO-  Entered: Mar 19 2025  4:41AM  
This patient has been assessed with a concern for Malnutrition and has been determined to have a diagnosis/diagnoses of Underweight (BMI < 19).    This patient is being managed with:   Diet NPO-  Entered: Mar 19 2025  4:41AM  
This patient has been assessed with a concern for Malnutrition and has been determined to have a diagnosis/diagnoses of Moderate protein-calorie malnutrition and Underweight (BMI < 19).    This patient is being managed with:   Diet NPO-  Entered: Mar 19 2025  4:41AM  

## 2025-04-04 NOTE — PROVIDER CONTACT NOTE (OTHER) - ASSESSMENT
No response to external stimuli  No spontaneous respirations  No apical heart rate   Negative pupillary response to light

## 2025-04-04 NOTE — DISCHARGE NOTE FOR THE EXPIRED PATIENT - HOSPITAL COURSE
89F PMH HLD, CKD3, legally blind, b/l SNHL, dementia (minimally verbally responsive baseline per ED), urinary retention, nontoxic goiter, GERD, constipation, anxiety, depression, R femur fx, presents from [facility]  respiratory distress (hypoxemia to SpO2% 80s by EMS) and decreased responsiveness a/w sepsis 2/2 PNA/UTI c/f aspiration.   Given poor functional status and no improvement after treatment of PNA/UTI, patient transferred to the [facility] for management of dyspnea and end of life care.

## 2025-04-04 NOTE — PROGRESS NOTE ADULT - PROBLEM SELECTOR PLAN 6
- End stage, fast 7F eligible for hospice services, but blood pressure unstable and appears more active in dying process.
- Family updated daily and educated as to what to expect, questions answered and emotional support provided.   - Disposition planning will be guided by clinical course.
see goc note.
see goc note.
- End stage, fast 7F eligible for hospice services, but blood pressure unstable and appears more active in dying process.
- I called and spoke to Liv, updated as to current clinical condition and plan of care.  Educated as to what to expect, questions answered and emotional support provided.   - Disposition planning will be guided by clinical course.
- Family updated daily and educated as to what to expect, questions answered and emotional support provided. Patient appears to be in active dying process.
- End stage, fast 7F eligible for hospice services, but blood pressure unstable and appears more active in dying process.

## 2025-04-04 NOTE — PROGRESS NOTE ADULT - ASSESSMENT
89F PMH HLD, CKD3, legally blind, b/l SNHL, dementia (minimally verbally responsive baseline per ED), urinary retention, nontoxic goiter, GERD, constipation, anxiety, depression, R femur fx, presents from [facility] half-way reportedly for respiratory distress (hypoxemia to SpO2% 80s by EMS) and decreased responsiveness a/w sepsis 2/2 PNA/UTI c/f aspiration    Sepsis due to pneumonia.   - resolved  - s/p  zosyn  - s/p IVF     Acute UTI.   - s/p antibiotic    Acute hypoxemic respiratory failure.    - comfort care    Metabolic encephalopathy.   - comfort care    Hypercalcemia.   - s/p IVF     Hypernatremia.   - s/p IVF    Chronic hypertension.   - comfort care    Stage 3 chronic kidney disease.   - stable     Abnormal findings on examination of body structure. / Osteoma  - R forehead firm, circumscribed, immobile mass nontender to palpation w/o report of fall from emergency contact/ED.      Dementia.   - baseline nonverbal per emergency contact    Malnutrition  - s/p IVF    Decubitus prevention  - comfort care    Functional quadriplegia  - supportive care      PCU care    DNR    Comfort care    PCU care    Andres James MD phone [phone]

## 2025-04-04 NOTE — PROGRESS NOTE ADULT - REASON FOR ADMISSION
AHRF, sepsis presume 2/2 PNA, UTI

## 2025-04-04 NOTE — PROGRESS NOTE ADULT - NS ATTEND OPT1A GEN_ALL_CORE
Exam/Medical decision making

## 2025-04-04 NOTE — PROGRESS NOTE ADULT - SUBJECTIVE AND OBJECTIVE BOX
Patient is a 89y old  Female who presents with a chief complaint of AHRF, sepsis presume 2/2 PNA, UTI (03 Apr 2025 17:07)      SUBJECTIVE / OVERNIGHT EVENTS: lethargic  Review of Systems  unobtainable     MEDICATIONS  (STANDING):  HYDROmorphone  Injectable 0.5 milliGRAM(s) IV Push every 6 hours    MEDICATIONS  (PRN):  acetaminophen  Suppository .. 650 milliGRAM(s) Rectal every 6 hours PRN Temp greater or equal to 38C (100.4F), Mild Pain (1 - 3)  bisacodyl Suppository 10 milliGRAM(s) Rectal daily PRN Constipation  glycopyrrolate Injectable 0.4 milliGRAM(s) IV Push every 6 hours PRN secretions  HYDROmorphone  Injectable 0.5 milliGRAM(s) IV Push every 1 hour PRN Moderate Pain (4 - 6)  HYDROmorphone  Injectable 1 milliGRAM(s) IV Push every 1 hour PRN Severe Pain (7 - 10)  HYDROmorphone  Injectable 1 milliGRAM(s) IV Push every 1 hour PRN dyspnea  LORazepam   Injectable 0.5 milliGRAM(s) IV Push every 6 hours PRN Agitation      Vital Signs Last 24 Hrs  T(C): 36.8 (04 Apr 2025 08:00), Max: 36.8 (04 Apr 2025 08:00)  T(F): 98.2 (04 Apr 2025 08:00), Max: 98.2 (04 Apr 2025 08:00)  HR: 131 (04 Apr 2025 08:00) (131 - 131)  BP: 69/54 (04 Apr 2025 08:00) (69/54 - 69/54)  BP(mean): --  RR: 20 (04 Apr 2025 08:00) (20 - 20)  SpO2: --        PHYSICAL EXAM:  GENERAL: NAD  HEAD:  Atraumatic, Normocephalic  NECK: Supple, No JVD  CHEST/LUNG: Clear to auscultation bilaterally; No wheeze  HEART: Regular rate and rhythm; No murmurs, rubs, or gallops  ABDOMEN: Soft, Nontender, Nondistended; Bowel sounds present  EXTREMITIES:  2+ Peripheral Pulses, No clubbing, cyanosis, or edema  PSYCH: AAOx0  NEUROLOGY: non-focal  SKIN: No rashes or lesions    LABS:                      RADIOLOGY & ADDITIONAL TESTS:    Imaging Personally Reviewed:    Consultant(s) Notes Reviewed:      Care Discussed with Consultants/Other Providers:

## 2025-04-04 NOTE — PROGRESS NOTE ADULT - [place] SYMPTOM ASSESSMENT: OTHER PROBLEM DESCRIPTION
Unable to self report.
unable to assess due to medical status
Unable to self report.

## 2025-04-04 NOTE — PROGRESS NOTE ADULT - SUBJECTIVE AND OBJECTIVE BOX
GAP TEAM PALLIATIVE CARE UNIT PROGRESS NOTE:      [  ] Patient on hospice program.    INDICATION FOR PALLIATIVE CARE UNIT SERVICES/Interval HPI: Symptom management in the setting of a 89 f w/ dementia (Namenda), dysphagia, HTN, HLD, CAD (s/p stent, on ASA and plavix), TIA, MGUS, neuropathy, DCIS (s/p anastrazole). Admitted s/p fall from home. Found to have AAA, recommended for medical management. Also with functional decline and behavioral disturbance at home.     OVERNIGHT EVENTS: Chart reviewed. The patient is seen and examined at the bedside. She required PRN Dilaudid 1mg IV X1 for dyspnea and X1 for agitation within a 24hr period 7am-7am.    DNR on chart:  DNI: Trial NIV  DNI: Trial NIV        Allergies    No Known Allergies    Intolerances    MEDICATIONS  (STANDING):  HYDROmorphone  Injectable 0.5 milliGRAM(s) IV Push every 6 hours    MEDICATIONS  (PRN):  acetaminophen  Suppository .. 650 milliGRAM(s) Rectal every 6 hours PRN Temp greater or equal to 38C (100.4F), Mild Pain (1 - 3)  bisacodyl Suppository 10 milliGRAM(s) Rectal daily PRN Constipation  glycopyrrolate Injectable 0.4 milliGRAM(s) IV Push every 6 hours PRN secretions  HYDROmorphone  Injectable 0.5 milliGRAM(s) IV Push every 1 hour PRN Moderate Pain (4 - 6)  HYDROmorphone  Injectable 1 milliGRAM(s) IV Push every 1 hour PRN Severe Pain (7 - 10)  HYDROmorphone  Injectable 1 milliGRAM(s) IV Push every 1 hour PRN dyspnea  LORazepam   Injectable 0.5 milliGRAM(s) IV Push every 6 hours PRN Agitation    ITEMS UNCHECKED ARE NOT PRESENT    PRESENT SYMPTOMS: [X ]Unable to self-report see PAINAD, RDOS below  Source if other than patient:  [ ]Family   [X ]Team     Pain: [ ] yes [ ] no  QOL impact -   Location -                    Aggravating factors -  Quality -  Radiation -  Timing-  Severity (0-10 scale):  Minimal acceptable level (0-10 scale):       PCSSQ [Palliative Care Spiritual Screening Question]   Severity (0-10):  Score of 4 or > indicate consideration of Chaplaincy referral.  Chaplaincy Referral: [ ] yes [ ] refused [ X] following [ ] deferred- Last seen on 4/2    Caregiver [place]? : [X ] yes [ ] no [ ] deferred:  Social work referral [X ] Patient & Family Centered Care Referral [ ]     Anticipatory Grief present?:  [X ] yes [ ] no [ ] deferred:  Social work referral [X ] Patient & Family Centered Care Referral [ ]  	  Other Symptoms: Unable to self report    PHYSICAL EXAM:   Vital Signs Last 24 Hrs  T(C): 36.8 (04 Apr 2025 08:00), Max: 36.8 (04 Apr 2025 08:00)  T(F): 98.2 (04 Apr 2025 08:00), Max: 98.2 (04 Apr 2025 08:00)  HR: 131 (04 Apr 2025 08:00) (131 - 131)  BP: 69/54 (04 Apr 2025 08:00) (69/54 - 69/54)  BP(mean): --  RR: 20 (04 Apr 2025 08:00) (20 - 20)  SpO2: --    I&O's Summary    GENERAL: [ ] Cachexia  [ ]Alert  [ ]Oriented x   [ ]Lethargic  [ X]Unarousable  [ ]Verbal  [X ]Non-Verbal  Behavioral:   [ ] Anxiety  [ ] Delirium [ ] Agitation [ ] Other  HEENT:  [ ]Normal   [X ]Dry mouth   [ ]ET Tube/Trach  [ ]Oral lesions  PULMONARY:   [ ]Clear [ ]Tachypnea  [ ]Audible excessive secretions   [ ]Rhonchi        [ ]Right [ ]Left [ ]Bilateral  [ ]Crackles        [ ]Right [ ]Left [ ]Bilateral  [ ]Wheezing     [ ]Right [ ]Left [ ]Bilateral  [ X]Diminished BS [ ]Right [ ]Left [X ]Bilateral    CARDIOVASCULAR:    [ ]Regular [ ]Irregular [X ]Tachy  [ ]Avinash [ ]Murmur [ ]Other  GASTROINTESTINAL:  [X ]Soft  [ ]Distended   [ X]+BS  [ X]Non tender [ ]Tender  [ ]Other [ ]PEG [ ]OGT/ NGT   Last BM: 3/26  GENITOURINARY:  [X]Normal [X ] Incontinent   [ ]Oliguria/Anuria   [ X]Morales  MUSCULOSKELETAL:   [ ]Normal   [ X]Weakness  [ X]Bed/Wheelchair bound [ ]Edema  NEUROLOGIC:   [ ]No focal deficits  [X ] Cognitive impairment  [ ] Dysphagia [ ]Dysarthria [ ] Paresis [ ]Other   SKIN:   [ ]Normal  [ ]Rash  [X ]Other- Incontinence associated dermatitis.   [ X]Pressure ulcer(s)  [ ]y [ ]n  Present on admission  Sacrum and right heel DTI. Please see nursing assessment for further details for which I have reviewed.    CRITICAL CARE:  [ ] Shock Present  [ ]Septic [ ]Cardiogenic [ ]Neurologic [ ]Hypovolemic  [ ]  Vasopressors [ ]  Inotropes   [ ] Respiratory failure present [ ] Mechanical Ventilation [ ] Non-invasive ventilatory support [ ] High-Flow    [ ] Acute  [ ] Chronic [ ] Hypoxic  [ ] Hypercarbic [ ] Other  [ X] Other organ failure- Brain, skin    LABS: None new.    RADIOLOGY & ADDITIONAL STUDIES: None new.    PROTEIN CALORIE MALNUTRITION: [ ] mild [ ] moderate [ ] severe  [X ] underweight- please see the nutritionist note [ ] morbid obesity    https://www.andeal.org/vault/2440/web/files/ONC/Table_Clinical%20Characteristics%20to%20Document%20Malnutrition-White%20JV%20et%20al%439305.pdf[ ] PPSV2 < or = 30% [ ] significant weight loss [ ] poor nutritional intake [ ] anasarca   Artificial Nutrition [ ]     Other REFERRALS:    [ ] Hospice  [ ]Child Life  [X ]Social Work  [ ]Case management [ ]Holistic Therapy [ ] Physical Therapy [ ] Dietary         Palliative Performance Scale:  http://npcrc.org/files/news/palliative_performance_scale_ppsv2.pdf  (Ctrl +  left click to view)  Respiratory Distress Observation Tool:  https://homecareinformation.net/handouts/hen/Respiratory_Distress_Observation_Scale.pdf (Ctrl +  left click to view)  PAINAD Score:  http://geriatrictoolkit.[place].[facility]/cog/painad.pdf (Ctrl +  left click to view)   GAP TEAM PALLIATIVE CARE UNIT PROGRESS NOTE:      [  ] Patient on hospice program.    INDICATION FOR PALLIATIVE CARE UNIT SERVICES/Interval HPI: Symptom management in the setting of a 89 f w/ dementia (Namenda), dysphagia, HTN, HLD, CAD (s/p stent, on ASA and plavix), TIA, MGUS, neuropathy, DCIS (s/p anastrazole). Admitted s/p fall from home. Found to have AAA, recommended for medical management. Also with functional decline and behavioral disturbance at home.     OVERNIGHT EVENTS: Chart reviewed. The patient is seen and examined at the bedside. She required PRN Dilaudid 1mg IV X1 for dyspnea and X1 for agitation within a 24hr period 7am-7am.    DNR on chart: yes  DNI      Allergies    No Known Allergies    Intolerances    MEDICATIONS  (STANDING):  HYDROmorphone  Injectable 0.5 milliGRAM(s) IV Push every 6 hours    MEDICATIONS  (PRN):  acetaminophen  Suppository .. 650 milliGRAM(s) Rectal every 6 hours PRN Temp greater or equal to 38C (100.4F), Mild Pain (1 - 3)  bisacodyl Suppository 10 milliGRAM(s) Rectal daily PRN Constipation  glycopyrrolate Injectable 0.4 milliGRAM(s) IV Push every 6 hours PRN secretions  HYDROmorphone  Injectable 0.5 milliGRAM(s) IV Push every 1 hour PRN Moderate Pain (4 - 6)  HYDROmorphone  Injectable 1 milliGRAM(s) IV Push every 1 hour PRN Severe Pain (7 - 10)  HYDROmorphone  Injectable 1 milliGRAM(s) IV Push every 1 hour PRN dyspnea  LORazepam   Injectable 0.5 milliGRAM(s) IV Push every 6 hours PRN Agitation    ITEMS UNCHECKED ARE NOT PRESENT    PRESENT SYMPTOMS: [X ]Unable to self-report see PAINAD, RDOS below  Source if other than patient:  [ ]Family   [X ]Team     Pain: [ ] yes [ ] no  QOL impact -   Location -                    Aggravating factors -  Quality -  Radiation -  Timing-  Severity (0-10 scale):  Minimal acceptable level (0-10 scale):       PCSSQ [Palliative Care Spiritual Screening Question]   Severity (0-10):  Score of 4 or > indicate consideration of Chaplaincy referral.  Chaplaincy Referral: [ ] yes [ ] refused [ X] following [ ] deferred- Last seen on 4/2    Caregiver [place]? : [X ] yes [ ] no [ ] deferred:  Social work referral [X ] Patient & Family Centered Care Referral [ ]     Anticipatory Grief present?:  [X ] yes [ ] no [ ] deferred:  Social work referral [X ] Patient & Family Centered Care Referral [ ]  	  Other Symptoms: Unable to self report    PHYSICAL EXAM:   Vital Signs Last 24 Hrs  T(C): 36.8 (04 Apr 2025 08:00), Max: 36.8 (04 Apr 2025 08:00)  T(F): 98.2 (04 Apr 2025 08:00), Max: 98.2 (04 Apr 2025 08:00)  HR: 131 (04 Apr 2025 08:00) (131 - 131)  BP: 69/54 (04 Apr 2025 08:00) (69/54 - 69/54)  BP(mean): --  RR: 20 (04 Apr 2025 08:00) (20 - 20)  SpO2: --    I&O's Summary    GENERAL: [ ] Cachexia  [ ]Alert  [ ]Oriented x   [ ]Lethargic  [ X]Unarousable  [ ]Verbal  [X ]Non-Verbal  Behavioral:   [ ] Anxiety  [ ] Delirium [ ] Agitation [ ] Other  HEENT:  [ ]Normal   [X ]Dry mouth   [ ]ET Tube/Trach  [ ]Oral lesions  PULMONARY:   [ ]Clear [ ]Tachypnea  [ ]Audible excessive secretions   [ ]Rhonchi        [ ]Right [ ]Left [ ]Bilateral  [ ]Crackles        [ ]Right [ ]Left [ ]Bilateral  [ ]Wheezing     [ ]Right [ ]Left [ ]Bilateral  [ X]Diminished BS [ ]Right [ ]Left [X ]Bilateral    CARDIOVASCULAR:    [ ]Regular [ ]Irregular [X ]Tachy  [ ]Avinash [ ]Murmur [ ]Other  GASTROINTESTINAL:  [X ]Soft  [ ]Distended   [ X]+BS  [ X]Non tender [ ]Tender  [ ]Other [ ]PEG [ ]OGT/ NGT   Last BM: 3/26  GENITOURINARY:  [X]Normal [X ] Incontinent   [ ]Oliguria/Anuria   [ X]Morales  MUSCULOSKELETAL:   [ ]Normal   [ X]Weakness  [ X]Bed/Wheelchair bound [ ]Edema  NEUROLOGIC:   [ ]No focal deficits  [X ] Cognitive impairment  [ ] Dysphagia [ ]Dysarthria [ ] Paresis [ ]Other   SKIN:   [ ]Normal  [ ]Rash  [X ]Other- Incontinence associated dermatitis.   [ X]Pressure ulcer(s)  [ ]y [ ]n  Present on admission  Sacrum and right heel DTI. Please see nursing assessment for further details for which I have reviewed.    CRITICAL CARE:  [ ] Shock Present  [ ]Septic [ ]Cardiogenic [ ]Neurologic [ ]Hypovolemic  [ ]  Vasopressors [ ]  Inotropes   [ ] Respiratory failure present [ ] Mechanical Ventilation [ ] Non-invasive ventilatory support [ ] High-Flow    [ ] Acute  [ ] Chronic [ ] Hypoxic  [ ] Hypercarbic [ ] Other  [ X] Other organ failure- Brain, skin    LABS: None new.    RADIOLOGY & ADDITIONAL STUDIES: None new.    PROTEIN CALORIE MALNUTRITION: [ ] mild [ ] moderate [ ] severe  [X ] underweight- please see the nutritionist note [ ] morbid obesity    https://www.andeal.org/vault/2440/web/files/ONC/Table_Clinical%20Characteristics%20to%20Document%20Malnutrition-White%20JV%20et%20al%414212.pdf[ ] PPSV2 < or = 30% [ ] significant weight loss [ ] poor nutritional intake [ ] anasarca   Artificial Nutrition [ ]     Other REFERRALS:    [ ] Hospice  [ ]Child Life  [X ]Social Work  [ ]Case management [ ]Holistic Therapy [ ] Physical Therapy [ ] Dietary         Palliative Performance Scale:  http://npcrc.org/files/news/palliative_performance_scale_ppsv2.pdf  (Ctrl +  left click to view)  Respiratory Distress Observation Tool:  https://homecareinformation.net/handouts/hen/Respiratory_Distress_Observation_Scale.pdf (Ctrl +  left click to view)  PAINAD Score:  http://geriatrictoolkit.[facility]/cog/painad.pdf (Ctrl +  left click to view)

## 2025-04-04 NOTE — PROGRESS NOTE ADULT - PROBLEM SELECTOR PLAN 5
-Possible aspiration event  -Continue Zosyn  -Duoneb q6h.
-Possible aspiration event  -Continue Zosyn  -Duoneb q6h  -Aspiration precautions.
-Possible aspiration event. No clear infiltrate on CT chest  -Continue Zosyn  -Duoneb q6h  -Aspiration precautions.
- PPSV 10% The patient requires nursing support with all ADLs.  - Supportive care.  - Turn and position.  - Continue with good skin care as per hospital protocol
- PPSV 10% The patient requires nursing support with all ADLs.  - Supportive care.  - Turn and position.  - Continue with good skin care as per hospital protocol
- End stage, fast 7F eligible for hospice services, but blood pressure unstable and appears more active in dying process.
kps 20%
- End stage, fast 7F eligible for hospice services
- PPSV 10% The patient requires nursing support with all ADLs.  - Supportive care.  - Turn and position.  - Continue with good skin care as per hospital protocol
kps 20%
- PPSV 10% The patient requires nursing support with all ADLs.  - Supportive care.  - Turn and position.  - Continue with good skin care as per hospital protocol
- PPSV 10% The patient requires nursing support with all ADLs.  - Supportive care.  - Turn and position.  - Continue with good skin care as per hospital protocol
- End stage, fast 7F  eligible for hospice services
- patient will remain on the PCU  - continue to provide supportive care and symptom directed care

## 2025-04-04 NOTE — PROGRESS NOTE ADULT - NS ATTEST RISK PROBLEM GEN_ALL_CORE FT
1. Number and complexity of problems addressed for this patient:    1.1 Moderate (At least 1)  [ ] 1 or more chronic illnesses with exacerbation, progression, or side effects of treatment  [ ] 2 or more stable chronic illnesses  [ ] 1 undiagnosed new problem with uncertain prognosis  [ ] 1 acute illness with systemic symptoms  [ ] 1 acute complicated injury  1.2 High (At least 1)   [x ] 1 or more chronic illnesses with severe exacerbation, progression, or side effects of treatment  [x ] 1 acute or chronic illnesses or injuries that may pose a threat to life or bodily function    2. Amount and/or Complexity of Data that was Reviewed and Analyzed for this case:       Moderate (1 out of 3)       High (2 out of 3)  2.1. (Any combination of 3 of the following)   [ ] Prior External notes were reviewed  [ ] Each test result was reviewed (see "LABS" and "RADIOLOGY & ADDITIONAL STUDIES" above)  [ ] The following tests were ordered and/or reviewed (Only count 1 point for ordering or reviewing a unique test):  	[ ]CBC  	[ ] Chemistry   	[ ] Imaging   	[ ] Other:   [ ] Assessment requiring an independent historian   		Name of historian and relationship:   2.2  [ ] Personally review and interpretation of  image or testing   2.3  [ ] Discussion of management or test interpretation with external physician/other qualified health care professional\appropriate source (not separately reported)    3. Risk of Complications and/or Morbidity or Mortality of for this Patient’s Management:  3.1 Moderate risk of morbidity from additional diagnostic testing or treatment (At least 1):   [ ] Prescription drug management   [ ] Decision regarding minor surgery, treatment, or procedure with identified patient or procedure risk factors  [ ] Decision regarding elective major surgery, treatment, or procedure without identified patient or procedure risk factors   [ ] Diagnosis or treatment significantly limited by social determinants of health   [ ] Other:   3.2 High risk of morbidity from additional diagnostic testing or treatment (At least 1):   [x ] Drug therapy requiring intensive monitoring for toxicity   [ ] Decision regarding elective major surgery, treatment, or procedure with identified patient or procedure risk factors   [ ] Decision regarding emergency major surgery, treatment, or procedure   [ ] Decision regarding hospitalization or escalation of hospital-level of care  [ ] Decision not to resuscitate, not to intubate, or to de-escalate care because of poor prognosis   [ ] Decision to proceed or not with artificial nutrition   [x ] Parenteral controlled substance  [ ] Other:
1. Number and complexity of problems addressed for this patient:    1.1 Moderate (At least 1)  [ ] 1 or more chronic illnesses with exacerbation, progression, or side effects of treatment  [ ] 2 or more stable chronic illnesses  [ ] 1 undiagnosed new problem with uncertain prognosis  [ ] 1 acute illness with systemic symptoms  [ ] 1 acute complicated injury  1.2 High (At least 1)   [x ] 1 or more chronic illnesses with severe exacerbation, progression, or side effects of treatment  [x ] 1 acute or chronic illnesses or injuries that may pose a threat to life or bodily function    2. Amount and/or Complexity of Data that was Reviewed and Analyzed for this case:       Moderate (1 out of 3)       High (2 out of 3)  2.1. (Any combination of 3 of the following)   [ ] Prior External notes were reviewed  [ ] Each test result was reviewed (see "LABS" and "RADIOLOGY & ADDITIONAL STUDIES" above)  [ ] The following tests were ordered and/or reviewed (Only count 1 point for ordering or reviewing a unique test):  	[ ]CBC  	[ ] Chemistry   	[ ] Imaging   	[ ] Other:   [ ] Assessment requiring an independent historian   		Name of historian and relationship:   2.2  [ ] Personally review and interpretation of  image or testing   2.3  [ ] Discussion of management or test interpretation with external physician/other qualified health care professional\appropriate source (not separately reported)    3. Risk of Complications and/or Morbidity or Mortality of for this Patient’s Management:  3.1 Moderate risk of morbidity from additional diagnostic testing or treatment (At least 1):   [ ] Prescription drug management   [ ] Decision regarding minor surgery, treatment, or procedure with identified patient or procedure risk factors  [ ] Decision regarding elective major surgery, treatment, or procedure without identified patient or procedure risk factors   [ ] Diagnosis or treatment significantly limited by social determinants of health   [ ] Other:   3.2 High risk of morbidity from additional diagnostic testing or treatment (At least 1):   [x ] Drug therapy requiring intensive monitoring for toxicity   [ ] Decision regarding elective major surgery, treatment, or procedure with identified patient or procedure risk factors   [ ] Decision regarding emergency major surgery, treatment, or procedure   [ ] Decision regarding hospitalization or escalation of hospital-level of care  [ ] Decision not to resuscitate, not to intubate, or to de-escalate care because of poor prognosis   [ ] Decision to proceed or not with artificial nutrition   [x ] Parenteral controlled substance  [ ] Other:
1. Number and complexity of problems addressed for this patient:    1.1 Moderate (At least 1)  [ ] 1 or more chronic illnesses with exacerbation, progression, or side effects of treatment  [ ] 2 or more stable chronic illnesses  [ ] 1 undiagnosed new problem with uncertain prognosis  [ ] 1 acute illness with systemic symptoms  [ ] 1 acute complicated injury  1.2 High (At least 1)   [ x] 1 or more chronic illnesses with severe exacerbation, progression, or side effects of treatment  [x ] 1 acute or chronic illnesses or injuries that may pose a threat to life or bodily function    2. Amount and/or Complexity of Data that was Reviewed and Analyzed for this case:       Moderate (1 out of 3)       High (2 out of 3)  2.1. (Any combination of 3 of the following)   [ ] Prior External notes were reviewed  [ ] Each test result was reviewed (see "LABS" and "RADIOLOGY & ADDITIONAL STUDIES" above)  [ ] The following tests were ordered and/or reviewed (Only count 1 point for ordering or reviewing a unique test):  	[ ]CBC  	[ ] Chemistry   	[ ] Imaging   	[ ] Other:   [ ] Assessment requiring an independent historian   		Name of historian and relationship:   2.2  [ ] Personally review and interpretation of  image or testing   2.3  [ ] Discussion of management or test interpretation with external physician/other qualified health care professional\appropriate source (not separately reported)    3. Risk of Complications and/or Morbidity or Mortality of for this Patient’s Management:  3.1 Moderate risk of morbidity from additional diagnostic testing or treatment (At least 1):   [ ] Prescription drug management   [ ] Decision regarding minor surgery, treatment, or procedure with identified patient or procedure risk factors  [ ] Decision regarding elective major surgery, treatment, or procedure without identified patient or procedure risk factors   [ ] Diagnosis or treatment significantly limited by social determinants of health   [ ] Other:   3.2 High risk of morbidity from additional diagnostic testing or treatment (At least 1):   [x ] Drug therapy requiring intensive monitoring for toxicity   [ ] Decision regarding elective major surgery, treatment, or procedure with identified patient or procedure risk factors   [ ] Decision regarding emergency major surgery, treatment, or procedure   [ ] Decision regarding hospitalization or escalation of hospital-level of care  [ ] Decision not to resuscitate, not to intubate, or to de-escalate care because of poor prognosis   [ ] Decision to proceed or not with artificial nutrition   [x ] Parenteral controlled substance  [ ] Other:

## 2025-04-04 NOTE — PROGRESS NOTE ADULT - PAIN ASSESSMENT ADVANCED DEMENTIA: FACIAL EXPRESSION
Smiling or inexpressive

## 2025-04-04 NOTE — PROGRESS NOTE ADULT - PAIN ASSESSMENT ADVANCED DEMENTIA: BREATHING
Normal
Noise labored breathing. Long period of hyperventilation. Cheyne-Thakur respirations.
Normal

## 2025-04-04 NOTE — PROGRESS NOTE ADULT - PROBLEM SELECTOR PROBLEM 6
Dementia
Dementia
Encounter for palliative care
Dementia
Encounter for palliative care
Advanced care planning/counseling discussion
Dementia
Dementia
Advanced care planning/counseling discussion
Encounter for palliative care

## 2025-04-04 NOTE — PROGRESS NOTE ADULT - PROBLEM SELECTOR PLAN 7
- Symptom management as above. The patient is not hemodynamically stable for discharge.  - I called and spoke to Liv, updated as to current clinical condition and plan of care.  Educated as to what to expect, questions answered and emotional support provided.
- Symptom management as above. The patient is not hemodynamically stable for discharge.  - Will continue to provide the patient's family with updates, answer questions and provide emotional support.
- Will continue to provide the patient's family with updates, answer questions and provide emotional support.
- I called Liv and informed her of the patient's declining condition, specifically the inability to maintain blood pressure and increased work of breathing. I addressed her questions and she will accompany the patient's spouse to the hospital today. I provided emotional support.  - Symptom management as above. The patient is not hemodynamically stable for discharge.
- I spoke with Liv yesterday, medical updates provided.  - Will continue to provide the patient's family with updates, answer questions and provide emotional support.

## 2025-04-04 NOTE — PROGRESS NOTE ADULT - RESPIRATORY DISTRESS OBSERVATION: HEART RATE
Less than 90 beats
Less than 90 beats
Greater than or equal to 110 beats
Less than 90 beats

## 2025-04-04 NOTE — PROGRESS NOTE ADULT - PAIN ASSESSMENT ADVANCED DEMENTIA: CONSOLABILITY
No need to console

## 2025-04-04 NOTE — PROGRESS NOTE ADULT - RESPIRATORY DISTRESS OBSERVATION: RESPIRATORY RATE
Less than or equal to 18 breaths
19 – 30 breaths
19 – 30 breaths
Less than or equal to 18 breaths
Less than or equal to 18 breaths
19 – 30 breaths
19 – 30 breaths

## 2025-04-04 NOTE — PROGRESS NOTE ADULT - PROBLEM SELECTOR PROBLEM 5
Encounter for palliative care
Functional quadriplegia
R/O Pneumonia
Functional quadriplegia
R/O Pneumonia
R/O Pneumonia
Encounter for palliative care
Dementia
Functional quadriplegia
Dementia
Functional quadriplegia
Dementia
Encounter for palliative care
Functional quadriplegia

## 2025-04-04 NOTE — PROGRESS NOTE ADULT - PROVIDER SPECIALTY LIST ADULT
Internal Medicine
Palliative Care
Infectious Disease
Internal Medicine
Palliative Care
Infectious Disease
Internal Medicine
Nephrology
Podiatry
Internal Medicine
Palliative Care
Palliative Care
Pulmonology
Pulmonology
Palliative Care
Palliative Care
Pulmonology
Palliative Care

## 2025-04-04 NOTE — PROGRESS NOTE ADULT - PROBLEM/PLAN-5
DISPLAY PLAN FREE TEXT
Female
DISPLAY PLAN FREE TEXT

## 2025-04-04 NOTE — PROGRESS NOTE ADULT - NS ATTEND AMEND GEN_ALL_CORE FT
89F PMH HLD, CKD3, legally blind, b/l SNHL, dementia (minimally verbally responsive baseline per ED), urinary retention, nontoxic goiter, GERD, constipation, anxiety, depression, R femur fx, presents from [facility]  respiratory distress (hypoxemia to SpO2% 80s by EMS) and decreased responsiveness a/w sepsis 2/2 PNA/UTI c/f aspiration.   Given poor functional status and no improvement after treatment of PNA/UTI, patient transferred to the [facility] for management of dyspnea and end of life care.      In the setting of parenteral controlled substance administration, clinical monitoring required for side effects such as respiratory depression, constipation and opioid induced neurotoxicity due to organ failure.  Medications available for dyspnea, pain, secretions, fever as needed.  ATC dilaudid started ATC for better control of dyspnea.      Family at bedside, updated as to current clinical condition and plan of care.  Educated as to what to expect, questions answered and emotional support provided.    Patient assessment and plan discussed on interdisciplinary team rounds today.    Hospice transition to be addressed if clinical condition permits.
89F PMH HLD, CKD3, legally blind, b/l SNHL, dementia (minimally verbally responsive baseline per ED), urinary retention, nontoxic goiter, GERD, constipation, anxiety, depression, R femur fx, presents from [facility]  respiratory distress (hypoxemia to SpO2% 80s by EMS) and decreased responsiveness a/w sepsis 2/2 PNA/UTI c/f aspiration.   Given poor functional status and no improvement after treatment of PNA/UTI, patient transferred to the [facility] for management of dyspnea and end of life care.      In the setting of parenteral controlled substance administration, clinical monitoring required for side effects such as respiratory depression, constipation and opioid induced neurotoxicity due to organ failure.      Family updated as to current clinical condition and plan of care.  Educated as to what to expect, questions answered and emotional support provided.    Patient assessment and plan discussed on interdisciplinary team rounds today.    Hospice transition will not take place due to hypotension and active dying process.
89F PMH HLD, CKD3, legally blind, b/l SNHL, dementia (minimally verbally responsive baseline per ED), urinary retention, nontoxic goiter, GERD, constipation, anxiety, depression, R femur fx, presents from [facility]  respiratory distress (hypoxemia to SpO2% 80s by EMS) and decreased responsiveness a/w sepsis 2/2 PNA/UTI c/f aspiration.   Given poor functional status and no improvement after treatment of PNA/UTI, patient transferred to the [facility] for management of dyspnea and end of life care.      In the setting of parenteral controlled substance administration, clinical monitoring required for side effects such as respiratory depression, constipation and opioid induced neurotoxicity due to organ failure.      Family updated as to current clinical condition and plan of care.  Educated as to what to expect, questions answered and emotional support provided.    Patient assessment and plan discussed on interdisciplinary team rounds today.    Hospice transition will not take place due to hypotension and active dying process.
89F PMH HLD, CKD3, legally blind, b/l SNHL, dementia (minimally verbally responsive baseline per ED), urinary retention, nontoxic goiter, GERD, constipation, anxiety, depression, R femur fx, presents from [facility]  respiratory distress (hypoxemia to SpO2% 80s by EMS) and decreased responsiveness a/w sepsis 2/2 PNA/UTI c/f aspiration.   Given poor functional status and no improvement after treatment of PNA/UTI, patient transferred to the [facility] for management of dyspnea and end of life care.      I have reviewed all documentation from prior primary team and consultants, as well as relevant imaging and laboratory data as this patient is new to me.    In the setting of parenteral controlled substance administration, clinical monitoring required for side effects such as respiratory depression, constipation and opioid induced neurotoxicity due to organ failure.  Medications available for dyspnea, pain, secretions, fever as needed.      Family at bedside, updated as to current clinical condition and plan of care.  Educated as to what to expect, questions answered and emotional support provided.    Patient assessment and plan discussed on interdisciplinary team rounds today.    Hospice transition to be addressed if clinical condition permits.
89F PMH HLD, CKD3, legally blind, b/l SNHL, dementia (minimally verbally responsive baseline per ED), urinary retention, nontoxic goiter, GERD, constipation, anxiety, depression, R femur fx, presents from [facility]  respiratory distress (hypoxemia to SpO2% 80s by EMS) and decreased responsiveness a/w sepsis 2/2 PNA/UTI c/f aspiration.   Given poor functional status and no improvement after treatment of PNA/UTI, patient transferred to the [facility] for management of dyspnea and end of life care.      In the setting of parenteral controlled substance administration, clinical monitoring required for side effects such as respiratory depression, constipation and opioid induced neurotoxicity due to organ failure.      Family updated as to current clinical condition and plan of care.  Educated as to what to expect, questions answered and emotional support provided.    Patient assessment and plan discussed on interdisciplinary team rounds today.    Hospice transition will not take place due to hypotension and active dying process.
89F PMH HLD, CKD3, legally blind, b/l SNHL, dementia (minimally verbally responsive baseline per ED), urinary retention, nontoxic goiter, GERD, constipation, anxiety, depression, R femur fx, presents from [facility]  respiratory distress (hypoxemia to SpO2% 80s by EMS) and decreased responsiveness a/w sepsis 2/2 PNA/UTI c/f aspiration.   Given poor functional status and no improvement after treatment of PNA/UTI, patient transferred to the [facility] for management of dyspnea and end of life care.      In the setting of parenteral controlled substance administration, clinical monitoring required for side effects such as respiratory depression, constipation and opioid induced neurotoxicity due to organ failure.      Family updated as to current clinical condition and plan of care.  Educated as to what to expect, questions answered and emotional support provided.    Patient assessment and plan discussed on interdisciplinary team rounds today.    Hospice transition on hold due to hypotension and active dying process.
89F PMH HLD, CKD3, legally blind, b/l SNHL, dementia (minimally verbally responsive baseline per ED), urinary retention, nontoxic goiter, GERD, constipation, anxiety, depression, R femur fx, presents from [facility]  respiratory distress (hypoxemia to SpO2% 80s by EMS) and decreased responsiveness a/w sepsis 2/2 PNA/UTI c/f aspiration.   Given poor functional status and no improvement after treatment of PNA/UTI, patient transferred to the [facility] for management of dyspnea and end of life care.      In the setting of parenteral controlled substance administration, clinical monitoring required for side effects such as respiratory depression, constipation and opioid induced neurotoxicity due to organ failure.      Family updated as to current clinical condition and plan of care.  Educated as to what to expect, questions answered and emotional support provided.    Patient assessment and plan discussed on interdisciplinary team rounds today.    Hospice transition on hold due to hypotension and active dying process.

## 2025-04-29 NOTE — ASU PREOPERATIVE ASSESSMENT, ADULT ([facility] ONLY) - IS PATIENT PREGNANT?
Problem: Adult Inpatient Plan of Care  Goal: Plan of Care Review  Outcome: Progressing  Goal: Patient-Specific Goal (Individualized)  Outcome: Progressing  Goal: Absence of Hospital-Acquired Illness or Injury  Outcome: Progressing  Goal: Optimal Comfort and Wellbeing  Outcome: Progressing  Goal: Readiness for Transition of Care  Outcome: Progressing          no